# Patient Record
Sex: FEMALE | Race: BLACK OR AFRICAN AMERICAN | NOT HISPANIC OR LATINO | Employment: OTHER | URBAN - METROPOLITAN AREA
[De-identification: names, ages, dates, MRNs, and addresses within clinical notes are randomized per-mention and may not be internally consistent; named-entity substitution may affect disease eponyms.]

---

## 2017-05-03 ENCOUNTER — OFFICE VISIT (OUTPATIENT)
Dept: RHEUMATOLOGY | Facility: CLINIC | Age: 67
End: 2017-05-03
Payer: MEDICARE

## 2017-05-03 ENCOUNTER — LAB VISIT (OUTPATIENT)
Dept: LAB | Facility: HOSPITAL | Age: 67
End: 2017-05-03
Attending: INTERNAL MEDICINE
Payer: MEDICARE

## 2017-05-03 VITALS
BODY MASS INDEX: 23.56 KG/M2 | HEART RATE: 71 BPM | SYSTOLIC BLOOD PRESSURE: 131 MMHG | DIASTOLIC BLOOD PRESSURE: 72 MMHG | HEIGHT: 64 IN | WEIGHT: 138 LBS

## 2017-05-03 DIAGNOSIS — M85.89 OSTEOPENIA OF MULTIPLE SITES: ICD-10-CM

## 2017-05-03 DIAGNOSIS — M32.9 SYSTEMIC LUPUS ERYTHEMATOSUS: ICD-10-CM

## 2017-05-03 DIAGNOSIS — I73.00 RAYNAUD'S PHENOMENON WITHOUT GANGRENE: ICD-10-CM

## 2017-05-03 DIAGNOSIS — Z51.81 MEDICATION MONITORING ENCOUNTER: ICD-10-CM

## 2017-05-03 DIAGNOSIS — M32.19 OTHER SYSTEMIC LUPUS ERYTHEMATOSUS WITH OTHER ORGAN INVOLVEMENT: Primary | ICD-10-CM

## 2017-05-03 DIAGNOSIS — M87.022 AVASCULAR NECROSIS OF HUMERAL HEAD, LEFT: ICD-10-CM

## 2017-05-03 DIAGNOSIS — E55.9 HYPOVITAMINOSIS D: Chronic | ICD-10-CM

## 2017-05-03 LAB
ALBUMIN SERPL BCP-MCNC: 3.9 G/DL
ALP SERPL-CCNC: 70 U/L
ALT SERPL W/O P-5'-P-CCNC: 13 U/L
ANION GAP SERPL CALC-SCNC: 8 MMOL/L
AST SERPL-CCNC: 22 U/L
BASOPHILS # BLD AUTO: 0.02 K/UL
BASOPHILS NFR BLD: 0.3 %
BILIRUB SERPL-MCNC: 1.1 MG/DL
BUN SERPL-MCNC: 10 MG/DL
C3 SERPL-MCNC: 144 MG/DL
C4 SERPL-MCNC: 34 MG/DL
CALCIUM SERPL-MCNC: 9.4 MG/DL
CHLORIDE SERPL-SCNC: 108 MMOL/L
CO2 SERPL-SCNC: 27 MMOL/L
CREAT SERPL-MCNC: 0.9 MG/DL
CRP SERPL-MCNC: 2.5 MG/L
DIFFERENTIAL METHOD: ABNORMAL
EOSINOPHIL # BLD AUTO: 0.1 K/UL
EOSINOPHIL NFR BLD: 0.8 %
ERYTHROCYTE [DISTWIDTH] IN BLOOD BY AUTOMATED COUNT: 12.3 %
ERYTHROCYTE [SEDIMENTATION RATE] IN BLOOD BY WESTERGREN METHOD: 13 MM/HR
EST. GFR  (AFRICAN AMERICAN): >60 ML/MIN/1.73 M^2
EST. GFR  (NON AFRICAN AMERICAN): >60 ML/MIN/1.73 M^2
GLUCOSE SERPL-MCNC: 93 MG/DL
HCT VFR BLD AUTO: 44.8 %
HGB BLD-MCNC: 14.7 G/DL
LYMPHOCYTES # BLD AUTO: 2.6 K/UL
LYMPHOCYTES NFR BLD: 36.7 %
MCH RBC QN AUTO: 31.6 PG
MCHC RBC AUTO-ENTMCNC: 32.8 %
MCV RBC AUTO: 96 FL
MONOCYTES # BLD AUTO: 0.6 K/UL
MONOCYTES NFR BLD: 8.1 %
NEUTROPHILS # BLD AUTO: 3.9 K/UL
NEUTROPHILS NFR BLD: 54.1 %
PLATELET # BLD AUTO: 196 K/UL
PMV BLD AUTO: 8.8 FL
POTASSIUM SERPL-SCNC: 3.9 MMOL/L
PROT SERPL-MCNC: 8.5 G/DL
RBC # BLD AUTO: 4.65 M/UL
SODIUM SERPL-SCNC: 143 MMOL/L
WBC # BLD AUTO: 7.17 K/UL

## 2017-05-03 PROCEDURE — 99999 PR PBB SHADOW E&M-EST. PATIENT-LVL III: CPT | Mod: PBBFAC,,, | Performed by: INTERNAL MEDICINE

## 2017-05-03 PROCEDURE — 1159F MED LIST DOCD IN RCRD: CPT | Mod: S$GLB,,, | Performed by: INTERNAL MEDICINE

## 2017-05-03 PROCEDURE — 1125F AMNT PAIN NOTED PAIN PRSNT: CPT | Mod: S$GLB,,, | Performed by: INTERNAL MEDICINE

## 2017-05-03 PROCEDURE — 99214 OFFICE O/P EST MOD 30 MIN: CPT | Mod: S$GLB,,, | Performed by: INTERNAL MEDICINE

## 2017-05-03 PROCEDURE — 1160F RVW MEDS BY RX/DR IN RCRD: CPT | Mod: S$GLB,,, | Performed by: INTERNAL MEDICINE

## 2017-05-03 RX ORDER — DESONIDE 0.5 MG/G
CREAM TOPICAL 2 TIMES DAILY
Qty: 60 G | Refills: 2 | Status: SHIPPED | OUTPATIENT
Start: 2017-05-03 | End: 2018-10-30 | Stop reason: SDUPTHER

## 2017-05-03 ASSESSMENT — ROUTINE ASSESSMENT OF PATIENT INDEX DATA (RAPID3): MDHAQ FUNCTION SCORE: 1.3

## 2017-05-03 ASSESSMENT — SYSTEMIC LUPUS ERYTHEMATOSUS DISEASE ACTIVITY INDEX (SLEDAI): TOTAL_SCORE: 0

## 2017-05-03 NOTE — PATIENT INSTRUCTIONS
Continue 3 plaquenil/  Week and vit D weekly    Exercises good    Ok for new cream to use on face twice day    Sunscreen daily

## 2017-05-03 NOTE — PROGRESS NOTES
RHEUMATOLOGY FOLLOWUP    CHIEF COMPLAINT:  Shoulder pain - 4/10.    PERTINENT HISTORY:  Alice is followed here with chronic systemic lupus.  It   has been significant in the past, but more recently it has been on remission.    Unfortunately, she developed AVN in both shoulders.  The left has been hurting   more than the right.  At last visit, we thought about Dr. Ruiz do a shoulder   replacement.  She actually went and talked to them and just never got set up.    She still rates her pain intolerable and in a hurry to get that done.  She does   have significant limited function of both of her shoulders, cannot hold her   grandchild, etc.  She is just taking pain meds on occasion.  She has had   tramadol.  From a lupus standpoint, she has got abnormal rash on her chin.  They   are occasional and she has been using some Kenalog.  No other significant   rashes.  She has not had any real pleurisy.  She does get pain in her costal   margin when she bends forward to  things.  It goes away quickly and it is   not pleuritic.  She has had no shortness of breath.  She had no angina.  No   real other cardiopulmonary symptoms.  GI tract has been doing good.  She has had   no hematuria or urinary symptoms.  Strength has been good.  She has had no   peripheral joints tenderness and swelling, mostly pain her shoulders.    REVIEW OF SYSTEMS:  GENERAL:  Weight is stable.  BMI is 23.  No serious infections.  Immunizations   look to be up-to-date, other than shingles.  It does not appear that she has had   Tdap recently either.  No fevers, chills, fatigability, change in appetite,   weight loss.  No recent infections.  SKIN:  A little rash on her chin off and on.  No itching or changes in color or   texture of skin, no Raynaud's, no malar rash.  HEAD:  No increased photosensitivity or hair loss.  No headache or recent head   trauma.  EYES:  Visual acuity fine.  No ocular pain or redness.  EARS:  Denies ear pain, discharge  or vertigo.  NOSE:  No loss of smell.  No epistaxis or postnasal drip.  MOUTH AND THROAT:  No stomatitis.  No hoarseness or change in voice or oral   ulcers.  No difficulty swallowing or dryness.  NODES:  Denies swollen glands.  CHEST:  Negative other than what is mentioned with the pain when she bends over.    Denies shortness of breath, KOROMA, cyanosis, wheezing, cough and sputum   production.  CARDIOVASCULAR:  Denies chest pain, PND, orthopnea or reduced exercise   tolerance.  ABDOMEN:  GI tract has been stable.  No weight loss.  Denies nausea, vomiting,   diarrhea, abdominal pain, hematemesis or blood in stool.  URINARY:  No flank pain, dysuria or hematuria.  PERIPHERAL VASCULAR:  Raynaud's still occurs, though no digital ulcers.  No new   peripheral rashes.  No claudication or cyanosis.  NEUROLOGIC:  No numbness, weakness or tingling.  MUSCULOSKELETAL:  Muscles, she gets occasional muscle spasms at night, uses   Flexeril, helps her rest.  She uses gabapentin as well 100 mg three times daily   to help her pain in her shoulders.    The rest of the ROS is normal.    PHYSICAL EXAMINATION:  VITAL SIGNS:  With a weight 62 kg, blood pressure 131/72, pulse in the 70s, pain   score 4.  APPEARANCE:  Well nourished, well developed, in no acute distress.  SKIN:  No rashes, no wounds, no ecchymosis.  Nail beds pink.  No digital ulcers.    No nodules or tightness noted.  HEENT:  She has no adenopathy today.  No parotid, submandibular thyroid   enlargement.  Normocephalic, atraumatic, alert and oriented X3.  PERRLA.    Conjunctivae clear, sclerae nonicteric.  No tonsillar enlargement.  No   pharyngeal erythema or exudate.  No ulcers or lesions noted.  Mucous membranes   moist and pink.  Oral pharynx clear.  Neck supple, no JVD.  Normal ROM.  Thyroid   normal.  No masses or tracheal deviation.  No cervical, axillary or inguinal   lymph node enlargement.  CHEST:  Lungs clear to auscultation bilaterally.  No dullness to  percussion.  No   accessory muscle use.  No intercostal retraction noted.  CARDIOVASCULAR:  Normal S1, S2.  No rubs, murmurs or gallops.  No peripheral   edema.  ABDOMEN:  Bowel sounds normal, abdomen soft, not distended.  No tenderness or   masses.  No hepatosplenomegaly.  EXTREMITIES:  No clubbing, cyanosis or edema noted.  Dorsalis and pedis pulses   2+ palpable.  NEUROLOGICAL:  Cranial nerves II-XII intact.  Motor 5/5 strength major   flexors/extensors.  No tremor.  GAIT AND POSTURE:  Normal gait.  No cerebellar signs.  MUSCULOSKELETAL:  Exam shows more limitation of both shoulders.  She has about   40 degrees of abduction and flexion on the right shoulder, maybe 20 to 25 on the   left shoulder.  X-rays reviewed with extensive cystic changes and flattening of   the humeral heads, with superior migration.  Other than around her shoulder   where she has pain, her hands look excellent with PIP, MPs, wrist and hip good   range of motion.  No swelling or redness.  Hips and knees do not move well.  No   fluid in either knee.  Ankles:  There is a little crepitus in the ankles, though   it does not hurt.  I do not have any x-rays on those areas.  MTPs are not   tender.    Laboratory studies that are back, white count 7000, with hematocrit 45%,   platelets 196, sed rate 13.  Electrolytes are normal.  Creatinine 0.9.  Glucose   normal.  Calcium normal.  LFTs normal.  Total protein slightly increased to 8.5.    Chronically elevated bilirubin consistent with Gilbert's.  Urinalysis with no   proteinuria, 1+ occult blood with only 6 red cells.    IMPRESSIONS:  1.  Systemic lupus erythematosus - CDAI presently is at 0.  2.  Osteopenia by history, on drug holiday presently.  3.  AVN humeral heads, symptomatic, left greater than right.    PLAN:  We will change her cream to DesOwen, as it is a little safer for her   face, 0.05%.  She could use Kenalog on her trunk if she gets a flare in the   future.  This rash that appears to be  on her chin is not a  classic lupus rash,   it may just be some type of eczema or rosacea type rash.  Keep Plaquenil at just   three times a week.  Continue off Fosamax with bone density due in March 2018.    Continue her vitamin D once a week.  Encouraged her to exercise and keep   healthy; otherwise, back here in about four months with routine lab.        /jon [id number] blank(s)        DANIKA/DAMARI  dd: 05/03/2017 10:38:15 (CDT)  td: 05/03/2017 20:46:05 (CDT)  Doc ID   #[id number]  Job ID #[id number]    CC: Nic Cuellar M.D.

## 2017-05-03 NOTE — MR AVS SNAPSHOT
[facility]  [address] [place] [address]  Phone: [phone]  Fax: [phone]                  Alice Loza   5/3/2017 9:30 AM   Office Visit    Description:  Female : 1950   Provider:  Nii Maguire MD   Department:  [facility] - Rheumatology           Reason for Visit     Lupus     Pain           Diagnoses this Visit        Comments    Other systemic lupus erythematosus with other organ involvement    -  Primary     Raynaud's phenomenon without gangrene         Medication monitoring encounter         Avascular necrosis of humeral head, left         Hypovitaminosis D         Osteopenia of multiple sites                To Do List           Future Appointments        Provider Department Dept Phone    2017 8:30 AM FIELDS, VISUAL-ONE [facility] [phone]    2017 9:00 AM ANIL Ruts OD [facility] [phone]    2017 9:40 AM LABORATORY, [facility] [phone]    2017 9:50 AM SPECIMEN, [facility] [phone]    2017 10:00 AM Cristina Tamayo PA-C [facility] [phone]      Goals (5 Years of Data)     None      Follow-Up and Disposition     Return in about 4 months (around 9/3/2017) for [facility] +ua.       These Medications        Disp Refills Start End    desonide (DESOWEN) 0.05 % cream 60 g 2 5/3/2017 2017    Apply topically 2 (two) times daily. - Topical (Top)    Pharmacy: [facility] [address] [place] [address] Ph #: [phone]         [facility] On [facility]     [facility] Nurse Care Line - 24/7 Assistance  Unless otherwise directed by your provider, please contact [facility], our nurse care line that is available for 24/7 assistance.     Registered nurses in the [facility] Center provide: appointment scheduling, clinical advisement, health education, and other advisory services.  Call: [phone] (toll  "free)               Medications           Message regarding Medications     Verify the changes and/or additions to your medication regime listed below are the same as discussed with your clinician today.  If any of these changes or additions are incorrect, please notify your healthcare provider.        START taking these NEW medications        Refills    desonide (DESOWEN) 0.05 % cream 2    Sig: Apply topically 2 (two) times daily.    Class: Print    Route: Topical (Top)           Verify that the below list of medications is an accurate representation of the medications you are currently taking.  If none reported, the list may be blank. If incorrect, please contact your healthcare provider. Carry this list with you in case of emergency.           Current Medications     cyclobenzaprine (FLEXERIL) 10 MG tablet Take 1 tablet (10 mg total) by mouth every evening.    desonide (DESOWEN) 0.05 % cream Apply topically 2 (two) times daily.    ergocalciferol (ERGOCALCIFEROL) 50,000 unit Cap Take 1 capsule (50,000 Units total) by mouth every 7 days.    gabapentin (NEURONTIN) 100 MG capsule Take 1 capsule (100 mg total) by mouth 3 (three) times daily. 1 capsule Oral Three times a day    HYDROCODONE/ACETAMINOPHEN (NORCO ORAL) Take by mouth.    hydroxychloroquine (PLAQUENIL) 200 mg tablet Take one tablet by  Mouth three times a week    lorazepam (ATIVAN) 0.5 MG tablet Take 1 tablet (0.5 mg total) by mouth every 8 (eight) hours as needed for Anxiety.    nystatin (MYCOSTATIN) powder Apply topically Twice daily.    tramadol (ULTRAM) 50 mg tablet Take 1 tablet (50 mg total) by mouth every 6 (six) hours as needed for Pain.    triamcinolone acetonide 0.1% (KENALOG) 0.1 % cream Apply topically 2 (two) times daily. Thin film to area on face           Clinical Reference Information           Your Vitals Were     BP Pulse Height Weight BMI    131/72 71 5' 4" (1.626 m) 62.6 kg (138 lb 0.1 oz) 23.69 kg/m2      Blood Pressure          Most " Recent Value    BP  131/72      Allergies as of 5/3/2017     No Known Drug Allergies      Immunizations Administered on Date of Encounter - 5/3/2017     None      Orders Placed During Today's Visit     Future Labs/Procedures Expected by Expires    MILDRED  5/3/2017 5/3/2018    Recurring Lab Work Interval Expires    C-reactive protein   5/3/2018    CBC auto differential   5/3/2018    Comprehensive metabolic panel   5/3/2018    Sedimentation rate, manual   5/3/2018    Urinalysis   7/2/2018      [facility] Sign-Up     Activating your [facility] account is as easy as 1-2-3!     1) Visit [email or web address], select Sign Up Now, enter this activation code and your date of birth, then select Next.  [email or web address]  Expires: 6/17/2017 10:46 AM      2) Create a username and password to use when you visit [facility] in the future and select a security question in case you lose your password and select Next.    3) Enter your e-mail address and click Sign Up!    Additional Information  If you have questions, please e-mail [email or web address] or call [phone] to talk to our [facility] staff. Remember, [facility] is NOT to be used for urgent needs. For medical emergencies, dial 911.         Instructions    Continue 3 plaquenil  Week and vit D weekly    Exercises good    Ok for new cream to use on face twice day    Sunscreen daily       Language Assistance Services     ATTENTION: Language assistance services are available, free of charge. Please call [phone].      ATENCIÓN: Si habla rosemaryañol, tiene a stevens disposición servicios gratuitos de asistencia lingüística. Llame al [phone].     [facility] Ý: N?u b?n nói Ti?ng Vi?t, có các d?ch v? h? tr? ngôn ng? mi?n phí dành cho b?n. G?i s? [phone].         [facility] - [facility] complies with applicable Federal civil rights laws and does not discriminate on the basis of race, color, national origin, age, disability, or sex.

## 2017-05-04 ENCOUNTER — OFFICE VISIT (OUTPATIENT)
Dept: OPHTHALMOLOGY | Facility: CLINIC | Age: 67
End: 2017-05-04
Payer: MEDICARE

## 2017-05-04 DIAGNOSIS — Z13.5 GLAUCOMA SCREENING: ICD-10-CM

## 2017-05-04 DIAGNOSIS — H52.7 REFRACTIVE ERROR: ICD-10-CM

## 2017-05-04 DIAGNOSIS — M32.19 OTHER SYSTEMIC LUPUS ERYTHEMATOSUS WITH OTHER ORGAN INVOLVEMENT: Primary | ICD-10-CM

## 2017-05-04 DIAGNOSIS — Z79.899 LONG-TERM USE OF PLAQUENIL: ICD-10-CM

## 2017-05-04 PROCEDURE — 92015 DETERMINE REFRACTIVE STATE: CPT | Mod: S$GLB,,, | Performed by: OPTOMETRIST

## 2017-05-04 PROCEDURE — 99999 PR PBB SHADOW E&M-EST. PATIENT-LVL I: CPT | Mod: PBBFAC,,, | Performed by: OPTOMETRIST

## 2017-05-04 PROCEDURE — 92134 CPTRZ OPH DX IMG PST SGM RTA: CPT | Mod: S$GLB,,, | Performed by: OPTOMETRIST

## 2017-05-04 PROCEDURE — 92081 LIMITED VISUAL FIELD XM: CPT | Mod: S$GLB,,, | Performed by: OPTOMETRIST

## 2017-05-04 PROCEDURE — 92014 COMPRE OPH EXAM EST PT 1/>: CPT | Mod: S$GLB,,, | Performed by: OPTOMETRIST

## 2017-05-04 NOTE — PROGRESS NOTES
HPI     Last [facility] exam 01/08/2014  Long term Plaquenil use  NS  Screening for glaucoma      RE  Decreased distance and near vision  HVF/MOCT (TODAY)         Last edited by Chepe Lobato MA on 5/4/2017  9:52 AM.         Assessment /Plan     For exam results, see Encounter Report.    Other systemic lupus erythematosus with other organ involvement    Long-term use of Plaquenil    Cataract, nuclear, bilateral    Glaucoma screening    Refractive error      No plaquenil maculopathy OU. OCT and 10-2 HVF =  NL OU. Mild NS cataracts OU = discussed and will follow.  OH OK OU otherwise. Spec Rx given.  RTC one year for repeat

## 2017-05-05 LAB — DSDNA AB SER-ACNC: NORMAL [IU]/ML

## 2017-08-11 ENCOUNTER — TELEPHONE (OUTPATIENT)
Dept: FAMILY MEDICINE | Facility: CLINIC | Age: 67
End: 2017-08-11

## 2017-08-11 NOTE — TELEPHONE ENCOUNTER
----- Message from Jennifer Jsoeph sent at 8/11/2017  8:30 AM CDT -----  Contact: self [phone] or [phone]  States that she is unable to put any weight on her legs. States that both legs are hurting but the rt one is the worse. Pt would like to be worked in for an appt for today. No other providers available. Please call back at [phone] or [phone]//thank you acc

## 2017-08-11 NOTE — TELEPHONE ENCOUNTER
Patient informed no available appointments in clinic today, informed to go to ER or walk in clinic of any distress or worsening of symptoms.  She will have someone bring her somewhere to get checked today.

## 2017-09-13 ENCOUNTER — OFFICE VISIT (OUTPATIENT)
Dept: RHEUMATOLOGY | Facility: CLINIC | Age: 67
End: 2017-09-13
Payer: MEDICARE

## 2017-09-13 ENCOUNTER — LAB VISIT (OUTPATIENT)
Dept: LAB | Facility: HOSPITAL | Age: 67
End: 2017-09-13
Attending: INTERNAL MEDICINE
Payer: MEDICARE

## 2017-09-13 VITALS
SYSTOLIC BLOOD PRESSURE: 120 MMHG | BODY MASS INDEX: 22.52 KG/M2 | HEART RATE: 68 BPM | WEIGHT: 131.19 LBS | DIASTOLIC BLOOD PRESSURE: 70 MMHG

## 2017-09-13 DIAGNOSIS — M85.89 OSTEOPENIA OF MULTIPLE SITES: ICD-10-CM

## 2017-09-13 DIAGNOSIS — Z51.81 MEDICATION MONITORING ENCOUNTER: ICD-10-CM

## 2017-09-13 DIAGNOSIS — M32.19 OTHER SYSTEMIC LUPUS ERYTHEMATOSUS WITH OTHER ORGAN INVOLVEMENT: ICD-10-CM

## 2017-09-13 DIAGNOSIS — M32.19 OTHER SYSTEMIC LUPUS ERYTHEMATOSUS WITH OTHER ORGAN INVOLVEMENT: Primary | ICD-10-CM

## 2017-09-13 DIAGNOSIS — M87.029: ICD-10-CM

## 2017-09-13 LAB
ALBUMIN SERPL BCP-MCNC: 3.9 G/DL
ALP SERPL-CCNC: 67 U/L
ALT SERPL W/O P-5'-P-CCNC: 7 U/L
ANION GAP SERPL CALC-SCNC: 9 MMOL/L
AST SERPL-CCNC: 18 U/L
BASOPHILS # BLD AUTO: 0.01 K/UL
BASOPHILS NFR BLD: 0.2 %
BILIRUB SERPL-MCNC: 1.2 MG/DL
BUN SERPL-MCNC: 12 MG/DL
CALCIUM SERPL-MCNC: 9.9 MG/DL
CHLORIDE SERPL-SCNC: 108 MMOL/L
CO2 SERPL-SCNC: 27 MMOL/L
CREAT SERPL-MCNC: 0.9 MG/DL
CRP SERPL-MCNC: 1.7 MG/L
DIFFERENTIAL METHOD: ABNORMAL
EOSINOPHIL # BLD AUTO: 0.1 K/UL
EOSINOPHIL NFR BLD: 0.9 %
ERYTHROCYTE [DISTWIDTH] IN BLOOD BY AUTOMATED COUNT: 12.8 %
ERYTHROCYTE [SEDIMENTATION RATE] IN BLOOD BY WESTERGREN METHOD: 10 MM/HR
EST. GFR  (AFRICAN AMERICAN): >60 ML/MIN/1.73 M^2
EST. GFR  (NON AFRICAN AMERICAN): >60 ML/MIN/1.73 M^2
GLUCOSE SERPL-MCNC: 94 MG/DL
HCT VFR BLD AUTO: 43.2 %
HGB BLD-MCNC: 14.3 G/DL
LYMPHOCYTES # BLD AUTO: 2.4 K/UL
LYMPHOCYTES NFR BLD: 37.1 %
MCH RBC QN AUTO: 32.2 PG
MCHC RBC AUTO-ENTMCNC: 33.1 G/DL
MCV RBC AUTO: 97 FL
MONOCYTES # BLD AUTO: 0.7 K/UL
MONOCYTES NFR BLD: 10.8 %
NEUTROPHILS # BLD AUTO: 3.3 K/UL
NEUTROPHILS NFR BLD: 51 %
PLATELET # BLD AUTO: 210 K/UL
PMV BLD AUTO: 8.7 FL
POTASSIUM SERPL-SCNC: 4.1 MMOL/L
PROT SERPL-MCNC: 8.4 G/DL
RBC # BLD AUTO: 4.44 M/UL
SODIUM SERPL-SCNC: 144 MMOL/L
WBC # BLD AUTO: 6.41 K/UL

## 2017-09-13 PROCEDURE — 86140 C-REACTIVE PROTEIN: CPT

## 2017-09-13 PROCEDURE — 99214 OFFICE O/P EST MOD 30 MIN: CPT | Mod: S$GLB,,, | Performed by: PHYSICIAN ASSISTANT

## 2017-09-13 PROCEDURE — 36415 COLL VENOUS BLD VENIPUNCTURE: CPT | Mod: PO

## 2017-09-13 PROCEDURE — 85025 COMPLETE CBC W/AUTO DIFF WBC: CPT | Mod: PO

## 2017-09-13 PROCEDURE — 1159F MED LIST DOCD IN RCRD: CPT | Mod: S$GLB,,, | Performed by: PHYSICIAN ASSISTANT

## 2017-09-13 PROCEDURE — 3008F BODY MASS INDEX DOCD: CPT | Mod: S$GLB,,, | Performed by: PHYSICIAN ASSISTANT

## 2017-09-13 PROCEDURE — 99999 PR PBB SHADOW E&M-EST. PATIENT-LVL III: CPT | Mod: PBBFAC,,, | Performed by: PHYSICIAN ASSISTANT

## 2017-09-13 PROCEDURE — 80053 COMPREHEN METABOLIC PANEL: CPT | Mod: PO

## 2017-09-13 PROCEDURE — 85651 RBC SED RATE NONAUTOMATED: CPT | Mod: PO

## 2017-09-13 PROCEDURE — 1125F AMNT PAIN NOTED PAIN PRSNT: CPT | Mod: S$GLB,,, | Performed by: PHYSICIAN ASSISTANT

## 2017-12-29 ENCOUNTER — OFFICE VISIT (OUTPATIENT)
Dept: FAMILY MEDICINE | Facility: CLINIC | Age: 67
End: 2017-12-29
Payer: MEDICARE

## 2017-12-29 ENCOUNTER — HOSPITAL ENCOUNTER (OUTPATIENT)
Dept: RADIOLOGY | Facility: HOSPITAL | Age: 67
Discharge: HOME | End: 2017-12-29
Attending: NURSE PRACTITIONER
Payer: MEDICARE

## 2017-12-29 VITALS
BODY MASS INDEX: 22.17 KG/M2 | HEART RATE: 76 BPM | DIASTOLIC BLOOD PRESSURE: 71 MMHG | HEIGHT: 64 IN | WEIGHT: 129.88 LBS | SYSTOLIC BLOOD PRESSURE: 123 MMHG | TEMPERATURE: 98 F

## 2017-12-29 DIAGNOSIS — R10.84 GENERALIZED ABDOMINAL PAIN: Primary | ICD-10-CM

## 2017-12-29 DIAGNOSIS — R10.84 GENERALIZED ABDOMINAL PAIN: ICD-10-CM

## 2017-12-29 DIAGNOSIS — R53.83 FATIGUE, UNSPECIFIED TYPE: ICD-10-CM

## 2017-12-29 PROCEDURE — 99999 PR PBB SHADOW E&M-EST. PATIENT-LVL III: CPT | Mod: PBBFAC,,, | Performed by: NURSE PRACTITIONER

## 2017-12-29 PROCEDURE — 99213 OFFICE O/P EST LOW 20 MIN: CPT | Mod: S$GLB,,, | Performed by: NURSE PRACTITIONER

## 2017-12-29 PROCEDURE — 74000 XR KUB: CPT | Mod: 26,,, | Performed by: RADIOLOGY

## 2017-12-29 PROCEDURE — 74000 XR KUB: CPT | Mod: TC,PO

## 2017-12-29 RX ORDER — HYOSCYAMINE SULFATE 0.125 MG
125 TABLET ORAL EVERY 4 HOURS PRN
Qty: 30 TABLET | Refills: 0 | Status: SHIPPED | OUTPATIENT
Start: 2017-12-29 | End: 2020-02-04

## 2017-12-29 NOTE — PATIENT INSTRUCTIONS
Abdominal Pain    Abdominal pain is pain in the stomach or belly area. Everyone has this pain from time to time. In many cases it goes away on its own. But abdominal pain can sometimes be due to a serious problem, such as appendicitis. So its important to know when to seek help.  Causes of abdominal pain  There are many possible causes of abdominal pain. Common causes in adults include:  · Constipation, diarrhea, or gas  · Stomach acid flowing back up into the esophagus (acid reflux or heartburn)  · Severe acid reflux, called GERD (gastroesophageal reflux disease)  · A sore in the lining of the stomach or small intestine (peptic ulcer)  · Inflammation of the gallbladder, liver, or pancreas  · Gallstones or kidney stones  · Appendicitis   · Intestinal blockage   · An internal organ pushing through a muscle or other tissue (hernia)  · Urinary tract infections  · In women, menstrual cramps, fibroids, or endometriosis  · Inflammation or infection of the intestines  Diagnosing the cause of abdominal pain  Your healthcare provider will do a physical exam help find the cause of your pain. If needed, tests will be ordered. Belly pain has many possible causes. So it can be hard to find the reason for your pain. Giving details about your pain can help. Tell your provider where and when you feel the pain, and what makes it better or worse. Also let your provider know if you have other symptoms such as:  · Fever  · Tiredness  · Upset stomach (nausea)  · Vomiting  · Changes in bathroom habits  Treating abdominal pain  Some causes of pain need emergency medical treatment right away. These include appendicitis or a bowel blockage. Other problems can be treated with rest, fluids, or medicines. Your healthcare provider can give you specific instructions for treatment or self-care based on what is causing your pain.  If you have vomiting or diarrhea, sip water or other clear fluids. When you are ready to eat solid foods again,  start with small amounts of easy-to-digest, low-fat foods. These include apple sauce, toast, or crackers.   When to seek medical care  Call 911 or go to the hospital right away if you:  · Cant pass stool and are vomiting  · Are vomiting blood or have bloody diarrhea or black, tarry diarrhea  · Have chest, neck, or shoulder pain  · Feel like you might pass out  · Have pain in your shoulder blades with nausea  · Have sudden, severe belly pain  · Have new, severe pain unlike any you have felt before  · Have a belly that is rigid, hard, and tender to touch  Call your healthcare provider if you have:  · Pain for more than 5 days  · Bloating for more than 2 days  · Diarrhea for more than 5 days  · A fever of 100.4°F (38.0°C) or higher, or as directed by your provider  · Pain that gets worse  · Weight loss for no reason  · Continued lack of appetite  · Blood in your stool  How to prevent abdominal pain  Here are some tips to help prevent abdominal pain:  · Eat smaller amounts of food at one time.  · Avoid greasy, fried, or other high-fat foods.  · Avoid foods that give you gas.  · Exercise regularly.  · Drink plenty of fluids.  To help prevent GERD symptoms:  · Quit smoking.  · Reduce alcohol and certain foods that increase stomach acid.  · Avoid aspirin and over-the-counter pain and fever medicines (NSAIDS or nonsteroidal anti-inflammatory drugs), if possible  · Lose extra weight.  · Finish eating at least 2 hours before you go to bed or lie down.  · Raise the head of your bed.  Date Last Reviewed: 7/1/2016  © 3190-2896 [email or web address]. [address] [place] [address], [place] [address]. All rights reserved. This information is not intended as a substitute for professional medical care. Always follow your healthcare professional's instructions.

## 2017-12-29 NOTE — PROGRESS NOTES
"Subjective:      Patient ID: Alice Loza is a 67 y.o. female.    Chief Complaint: Fatigue and Abdominal Pain    Fatigue   This is a new problem. Episode onset: Monday. The problem has been gradually improving. Associated symptoms include abdominal pain, arthralgias (chronic), fatigue and weakness. Pertinent negatives include no anorexia, chest pain, chills, congestion, coughing, fever, headaches, joint swelling, myalgias, nausea, numbness, rash, sore throat, urinary symptoms or vomiting. Nothing aggravates the symptoms. She has tried nothing for the symptoms. The treatment provided no relief.   Abdominal Pain   This is a new problem. Episode onset: Sunday. The problem occurs intermittently. The problem has been waxing and waning. The pain is located in the generalized abdominal region. The pain is moderate. The quality of the pain is sharp (sharp shooting pain at times; patient describes pain as "little midgets jumping all over belly"). The abdominal pain does not radiate. Associated symptoms include arthralgias (chronic). Pertinent negatives include no anorexia, belching, constipation, diarrhea, dysuria, fever, flatus, frequency, headaches, hematochezia, hematuria, melena, myalgias, nausea, vomiting or weight loss. Associated symptoms comments: Some heart burn. Nothing aggravates the pain. The pain is relieved by nothing. She has tried nothing for the symptoms. The treatment provided no relief.     Review of Systems   Constitutional: Positive for fatigue. Negative for chills, fever and weight loss.   HENT: Negative for congestion and sore throat.    Respiratory: Negative for cough, shortness of breath and wheezing.    Cardiovascular: Negative for chest pain, palpitations and leg swelling.   Gastrointestinal: Positive for abdominal pain. Negative for anorexia, constipation, diarrhea, flatus, hematochezia, melena, nausea and vomiting.   Genitourinary: Negative for dysuria, frequency and hematuria. " "  Musculoskeletal: Positive for arthralgias (chronic). Negative for joint swelling and myalgias.   Skin: Negative for rash and wound.   Neurological: Positive for weakness. Negative for numbness and headaches.   Psychiatric/Behavioral: The patient is not nervous/anxious.        Objective:     /71   Pulse 76   Temp 98.3 °F (36.8 °C) (Oral)   Ht 5' 4" (1.626 m)   Wt 58.9 kg (129 lb 13.6 oz)   BMI 22.29 kg/m²     Physical Exam   Constitutional: She is oriented to person, place, and time. She appears well-developed and well-nourished.   HENT:   Head: Normocephalic.   Eyes: Pupils are equal, round, and reactive to light.   Cardiovascular: Normal rate, regular rhythm and normal heart sounds.    Pulmonary/Chest: Effort normal and breath sounds normal. No respiratory distress. She has no decreased breath sounds. She has no wheezes. She has no rhonchi. She has no rales.   Abdominal: Soft. Bowel sounds are normal. She exhibits no distension and no mass. There is tenderness. There is no rebound and no guarding.   Lymphadenopathy:     She has no cervical adenopathy.   Neurological: She is alert and oriented to person, place, and time.   Skin: Skin is warm and dry. No rash noted.   Psychiatric: She has a normal mood and affect. Her behavior is normal. Judgment and thought content normal.   Vitals reviewed.    Assessment:     1. Generalized abdominal pain    2. Fatigue, unspecified type        Plan:     Problem List Items Addressed This Visit        Other    Fatigue      Other Visit Diagnoses     Generalized abdominal pain    -  Primary    Relevant Medications    hyoscyamine (ANASPAZ,LEVSIN) 0.125 mg Tab    Other Relevant Orders    X-Ray KUB      Sharp diet  Hydrate well   Report to ER if symptoms worsen    Return if symptoms worsen or fail to improve.        "

## 2018-01-09 ENCOUNTER — PATIENT OUTREACH (OUTPATIENT)
Dept: ADMINISTRATIVE | Facility: HOSPITAL | Age: 68
End: 2018-01-09

## 2018-01-09 DIAGNOSIS — Z11.59 NEED FOR HEPATITIS C SCREENING TEST: Primary | ICD-10-CM

## 2018-01-10 ENCOUNTER — LAB VISIT (OUTPATIENT)
Dept: LAB | Facility: HOSPITAL | Age: 68
End: 2018-01-10
Attending: FAMILY MEDICINE
Payer: MEDICARE

## 2018-01-10 DIAGNOSIS — Z51.81 MEDICATION MONITORING ENCOUNTER: ICD-10-CM

## 2018-01-10 DIAGNOSIS — Z11.59 NEED FOR HEPATITIS C SCREENING TEST: ICD-10-CM

## 2018-01-10 DIAGNOSIS — M32.19 OTHER SYSTEMIC LUPUS ERYTHEMATOSUS WITH OTHER ORGAN INVOLVEMENT: ICD-10-CM

## 2018-01-10 LAB
ALBUMIN SERPL BCP-MCNC: 3.4 G/DL
ALP SERPL-CCNC: 57 U/L
ALT SERPL W/O P-5'-P-CCNC: 7 U/L
ANION GAP SERPL CALC-SCNC: 10 MMOL/L
AST SERPL-CCNC: 17 U/L
BASOPHILS # BLD AUTO: 0.04 K/UL
BASOPHILS NFR BLD: 0.5 %
BILIRUB SERPL-MCNC: 0.7 MG/DL
BUN SERPL-MCNC: 8 MG/DL
C3 SERPL-MCNC: 152 MG/DL
C4 SERPL-MCNC: 36 MG/DL
CALCIUM SERPL-MCNC: 9.5 MG/DL
CHLORIDE SERPL-SCNC: 107 MMOL/L
CO2 SERPL-SCNC: 26 MMOL/L
CREAT SERPL-MCNC: 0.8 MG/DL
CRP SERPL-MCNC: 7.2 MG/L
DIFFERENTIAL METHOD: ABNORMAL
EOSINOPHIL # BLD AUTO: 0.1 K/UL
EOSINOPHIL NFR BLD: 1.3 %
ERYTHROCYTE [DISTWIDTH] IN BLOOD BY AUTOMATED COUNT: 12.3 %
ERYTHROCYTE [SEDIMENTATION RATE] IN BLOOD BY WESTERGREN METHOD: 37 MM/HR
EST. GFR  (AFRICAN AMERICAN): >60 ML/MIN/1.73 M^2
EST. GFR  (NON AFRICAN AMERICAN): >60 ML/MIN/1.73 M^2
GLUCOSE SERPL-MCNC: 89 MG/DL
HCT VFR BLD AUTO: 39.8 %
HGB BLD-MCNC: 12.7 G/DL
LYMPHOCYTES # BLD AUTO: 2.5 K/UL
LYMPHOCYTES NFR BLD: 32.6 %
MCH RBC QN AUTO: 30.8 PG
MCHC RBC AUTO-ENTMCNC: 31.9 G/DL
MCV RBC AUTO: 97 FL
MONOCYTES # BLD AUTO: 0.8 K/UL
MONOCYTES NFR BLD: 10.4 %
NEUTROPHILS # BLD AUTO: 4.3 K/UL
NEUTROPHILS NFR BLD: 55.2 %
PLATELET # BLD AUTO: 350 K/UL
PMV BLD AUTO: 8.2 FL
POTASSIUM SERPL-SCNC: 4.5 MMOL/L
PROT SERPL-MCNC: 8.3 G/DL
RBC # BLD AUTO: 4.12 M/UL
SODIUM SERPL-SCNC: 143 MMOL/L
WBC # BLD AUTO: 7.7 K/UL

## 2018-01-10 PROCEDURE — 86140 C-REACTIVE PROTEIN: CPT

## 2018-01-10 PROCEDURE — 86803 HEPATITIS C AB TEST: CPT

## 2018-01-10 PROCEDURE — 86160 COMPLEMENT ANTIGEN: CPT | Mod: 59

## 2018-01-10 PROCEDURE — 85651 RBC SED RATE NONAUTOMATED: CPT | Mod: PO

## 2018-01-10 PROCEDURE — 80053 COMPREHEN METABOLIC PANEL: CPT

## 2018-01-10 PROCEDURE — 36415 COLL VENOUS BLD VENIPUNCTURE: CPT | Mod: PO

## 2018-01-10 PROCEDURE — 86038 ANTINUCLEAR ANTIBODIES: CPT

## 2018-01-10 PROCEDURE — 85025 COMPLETE CBC W/AUTO DIFF WBC: CPT | Mod: PO

## 2018-01-10 PROCEDURE — 86160 COMPLEMENT ANTIGEN: CPT

## 2018-01-11 LAB
ANA SER QL IF: NORMAL
HCV AB SERPL QL IA: NEGATIVE

## 2018-01-23 NOTE — PROGRESS NOTES
"Subjective:       Patient ID: Alice Loza is a 67 y.o. female.    Chief Complaint: SLE, osteopoenia.    Alice is here for f/u. We see her for SLE, recently has been in remission but has been significant in the past.  She has AVN both shoulders, has seen Dr. Ruiz, was planning replacement at some point for both shoulders.  However recently they have felt fine, no issues with pain which is great.  Regarding her lupus, she is on plaquenil 3 times a week. Last several ANAs have been negative as has the dsdna, complements have been normal.     She also has a h/o osteopenia and is currently on bisphosphonate holiday (fosamax). Not taking saravanan D. Bone density due March 2018. She denies any fevers, hair loss, sob, new rashes (does have some hyperpigmentation on the tops of her feet around the 1 mtp), no GI issues, no joint swelling. She complains of feeling more tired lately.        Review of Systems   Constitutional: Positive for fatigue. Negative for chills and fever.   HENT: Negative for mouth sores, rhinorrhea and sore throat.    Eyes: Negative for pain and redness.   Respiratory: Negative for cough and shortness of breath.    Cardiovascular: Negative for chest pain.   Gastrointestinal: Negative for abdominal pain, constipation, diarrhea, nausea and vomiting.   Genitourinary: Negative for dysuria and hematuria.   Musculoskeletal: Positive for arthralgias and back pain. Negative for joint swelling and myalgias.        Left hip replacement   Skin: Negative for rash.   Neurological: Negative for weakness, numbness and headaches.   Psychiatric/Behavioral: The patient is not nervous/anxious.          Objective:   /66   Pulse 77   Ht 5' 4" (1.626 m)   Wt 57.8 kg (127 lb 6.8 oz)   BMI 21.87 kg/m²      Physical Exam   Constitutional: She is oriented to person, place, and time and well-developed, well-nourished, and in no distress.   HENT:   Head: Normocephalic and atraumatic.   Eyes: Pupils are equal, " round, and reactive to light. Right eye exhibits no discharge.   Neck: Normal range of motion.   Cardiovascular: Normal rate, regular rhythm and normal heart sounds.  Exam reveals no friction rub.    Pulmonary/Chest: Effort normal and breath sounds normal. No respiratory distress.   Abdominal: Soft. She exhibits no distension. There is no tenderness.   Lymphadenopathy:     She has no cervical adenopathy.   Neurological: She is alert and oriented to person, place, and time.   Skin: No rash noted. No erythema.     No rash  skyler feet with some hyperpigmentation to her dorsal surface around mtps   Psychiatric: Mood normal.   Musculoskeletal: Normal range of motion. She exhibits no edema or deformity.   skyler wrists, mcps, pips no synvoitis, no tenderness, no warmth, good rom  Shoulders decreased rom with right shoulder with 90 abduction and left at about 45 degress, tenderness to palpation  skyler knees no effusion, no warmth, no tenderness        Recent Results (from the past 840 hour(s))   Urinalysis    Collection Time: 01/10/18  7:36 AM   Result Value Ref Range    Specimen UA Urine, Clean Catch     Color, UA Yellow Yellow, Straw, Mary    Appearance, UA Clear Clear    pH, UA 8.0 5.0 - 8.0    Specific Gravity, UA 1.010 1.005 - 1.030    Protein, UA Negative Negative    Glucose, UA Negative Negative    Ketones, UA Negative Negative    Bilirubin (UA) Negative Negative    Occult Blood UA Trace (A) Negative    Nitrite, UA Negative Negative    Leukocytes, UA Negative Negative   CBC auto differential    Collection Time: 01/10/18  8:25 AM   Result Value Ref Range    WBC 7.70 3.90 - 12.70 K/uL    RBC 4.12 4.00 - 5.40 M/uL    Hemoglobin 12.7 12.0 - 16.0 g/dL    Hematocrit 39.8 37.0 - 48.5 %    MCV 97 82 - 98 fL    MCH 30.8 27.0 - 31.0 pg    MCHC 31.9 (L) 32.0 - 36.0 g/dL    RDW 12.3 11.5 - 14.5 %    Platelets 350 150 - 350 K/uL    MPV 8.2 (L) 9.2 - 12.9 fL    Gran # 4.3 1.8 - 7.7 K/uL    Lymph # 2.5 1.0 - 4.8 K/uL    Mono # 0.8 0.3 -  1.0 K/uL    Eos # 0.1 0.0 - 0.5 K/uL    Baso # 0.04 0.00 - 0.20 K/uL    Gran% 55.2 38.0 - 73.0 %    Lymph% 32.6 18.0 - 48.0 %    Mono% 10.4 4.0 - 15.0 %    Eosinophil% 1.3 0.0 - 8.0 %    Basophil% 0.5 0.0 - 1.9 %    Differential Method Automated    Comprehensive metabolic panel    Collection Time: 01/10/18  8:39 AM   Result Value Ref Range    Sodium 143 136 - 145 mmol/L    Potassium 4.5 3.5 - 5.1 mmol/L    Chloride 107 95 - 110 mmol/L    CO2 26 23 - 29 mmol/L    Glucose 89 70 - 110 mg/dL    BUN, Bld 8 8 - 23 mg/dL    Creatinine 0.8 0.5 - 1.4 mg/dL    Calcium 9.5 8.7 - 10.5 mg/dL    Total Protein 8.3 6.0 - 8.4 g/dL    Albumin 3.4 (L) 3.5 - 5.2 g/dL    Total Bilirubin 0.7 0.1 - 1.0 mg/dL    Alkaline Phosphatase 57 55 - 135 U/L    AST 17 10 - 40 U/L    ALT 7 (L) 10 - 44 U/L    Anion Gap 10 8 - 16 mmol/L    eGFR if African American >60.0 >60 mL/min/1.73 m^2    eGFR if non African American >60.0 >60 mL/min/1.73 m^2   C-reactive protein    Collection Time: 01/10/18  8:39 AM   Result Value Ref Range    CRP 7.2 0.0 - 8.2 mg/L   Sedimentation rate, manual    Collection Time: 01/10/18  8:39 AM   Result Value Ref Range    Sed Rate 37 (H) 0 - 20 mm/Hr   MILDRED    Collection Time: 01/10/18  8:39 AM   Result Value Ref Range    MILDRED Screen Negative <1:160 Negative <1:160   C3 complement    Collection Time: 01/10/18  8:39 AM   Result Value Ref Range    Complement (C-3) 152 50 - 180 mg/dL   C4 complement    Collection Time: 01/10/18  8:39 AM   Result Value Ref Range    Complement (C-4) 36 11 - 44 mg/dL   Hepatitis C antibody    Collection Time: 01/10/18  8:39 AM   Result Value Ref Range    Hepatitis C Ab Negative           Dexa 3.11.15: RF -1.0, RN -2.2, spine -0.2, stable bmd  Assessment:       1. Other systemic lupus erythematosus with other organ involvement    2. Avascular necrosis of humeral head, unspecified laterality    3. Raynaud's phenomenon without gangrene    4. Medication monitoring encounter    5. Osteopenia of multiple  sites    6. Avascular necrosis of humeral head, left    7. Disorder of bone and articular cartilage          Impression:  SLE: plaquenil 3 x week, stable, continues in remission with last few tamela's neg, dsdna and complements normal, no new symptoms, mild sed rate elevation but she says she was sick with a stomach bug at the time of her labs    Raynauds: stable    AVN skyler shoulders: planned replacements with dr. ma in future, chronic pain but lately have been doing much better, takes flexeril prn for pain    Medication monitoring; no signs of toxicity, sees eye doctor yearly    Osteopenia: on drug holiday (prev fosamax), dexa due march 2018;     Plan:       No change to plaquenil 3 x week  Cont f/u with ortho for shoulders  Cont yearly eye checks  Repeat dexa next visit on fosamax holiday currently  Refilled plaquenil and flexeril today     Return to clinic in 4 months with dsdna, reg 4, urine, complements

## 2018-01-24 ENCOUNTER — OFFICE VISIT (OUTPATIENT)
Dept: RHEUMATOLOGY | Facility: CLINIC | Age: 68
End: 2018-01-24
Payer: MEDICARE

## 2018-01-24 VITALS
BODY MASS INDEX: 21.76 KG/M2 | HEIGHT: 64 IN | SYSTOLIC BLOOD PRESSURE: 113 MMHG | WEIGHT: 127.44 LBS | HEART RATE: 77 BPM | DIASTOLIC BLOOD PRESSURE: 66 MMHG

## 2018-01-24 DIAGNOSIS — Z51.81 MEDICATION MONITORING ENCOUNTER: ICD-10-CM

## 2018-01-24 DIAGNOSIS — I73.00 RAYNAUD'S PHENOMENON WITHOUT GANGRENE: ICD-10-CM

## 2018-01-24 DIAGNOSIS — M94.9 DISORDER OF BONE AND ARTICULAR CARTILAGE: ICD-10-CM

## 2018-01-24 DIAGNOSIS — M87.022 AVASCULAR NECROSIS OF HUMERAL HEAD, LEFT: ICD-10-CM

## 2018-01-24 DIAGNOSIS — M85.89 OSTEOPENIA OF MULTIPLE SITES: ICD-10-CM

## 2018-01-24 DIAGNOSIS — M89.9 DISORDER OF BONE AND ARTICULAR CARTILAGE: ICD-10-CM

## 2018-01-24 DIAGNOSIS — M32.19 OTHER SYSTEMIC LUPUS ERYTHEMATOSUS WITH OTHER ORGAN INVOLVEMENT: Primary | ICD-10-CM

## 2018-01-24 DIAGNOSIS — M87.029: ICD-10-CM

## 2018-01-24 PROCEDURE — 99999 PR PBB SHADOW E&M-EST. PATIENT-LVL IV: CPT | Mod: PBBFAC,,, | Performed by: PHYSICIAN ASSISTANT

## 2018-01-24 PROCEDURE — 99214 OFFICE O/P EST MOD 30 MIN: CPT | Mod: S$GLB,,, | Performed by: PHYSICIAN ASSISTANT

## 2018-01-24 RX ORDER — HYDROXYCHLOROQUINE SULFATE 200 MG/1
TABLET, FILM COATED ORAL
Qty: 36 TABLET | Refills: 3 | Status: SHIPPED | OUTPATIENT
Start: 2018-01-24 | End: 2020-04-17 | Stop reason: SDUPTHER

## 2018-01-24 RX ORDER — CYCLOBENZAPRINE HCL 10 MG
10 TABLET ORAL NIGHTLY
Qty: 90 TABLET | Refills: 3 | Status: SHIPPED | OUTPATIENT
Start: 2018-01-24 | End: 2018-07-25 | Stop reason: SDUPTHER

## 2018-05-09 ENCOUNTER — OFFICE VISIT (OUTPATIENT)
Dept: OPHTHALMOLOGY | Facility: CLINIC | Age: 68
End: 2018-05-09
Payer: MEDICARE

## 2018-05-09 DIAGNOSIS — H25.13 CATARACT, NUCLEAR SCLEROTIC SENILE, BILATERAL: ICD-10-CM

## 2018-05-09 DIAGNOSIS — Z13.5 GLAUCOMA SCREENING: ICD-10-CM

## 2018-05-09 DIAGNOSIS — M32.19 OTHER SYSTEMIC LUPUS ERYTHEMATOSUS WITH OTHER ORGAN INVOLVEMENT: Primary | ICD-10-CM

## 2018-05-09 DIAGNOSIS — H52.13 MYOPIC ASTIGMATISM OF BOTH EYES: ICD-10-CM

## 2018-05-09 DIAGNOSIS — H52.4 PRESBYOPIA: ICD-10-CM

## 2018-05-09 DIAGNOSIS — H52.203 MYOPIC ASTIGMATISM OF BOTH EYES: ICD-10-CM

## 2018-05-09 DIAGNOSIS — Z79.899 LONG-TERM USE OF PLAQUENIL: ICD-10-CM

## 2018-05-09 PROCEDURE — 92014 COMPRE OPH EXAM EST PT 1/>: CPT | Mod: S$GLB,,, | Performed by: OPTOMETRIST

## 2018-05-09 PROCEDURE — 99999 PR PBB SHADOW E&M-EST. PATIENT-LVL I: CPT | Mod: PBBFAC,,, | Performed by: OPTOMETRIST

## 2018-05-09 PROCEDURE — 92015 DETERMINE REFRACTIVE STATE: CPT | Mod: S$GLB,,, | Performed by: OPTOMETRIST

## 2018-05-09 NOTE — PROGRESS NOTES
HPI     Last [facility] exam 05/04/2017  Long term plaquenil use (Lupus)  Cataract, nuclear, bilateral  Screening for glaucoma  RE  HVF/MOCT (Today)  DFE (Today)    Last edited by Chepe Lobato MA on 5/9/2018 10:06 AM. (History)            Assessment /Plan     For exam results, see Encounter Report.    Other systemic lupus erythematosus with other organ involvement    Long-term use of Plaquenil    Cataract, nuclear sclerotic senile, bilateral    Glaucoma screening    Myopic astigmatism of both eyes    Presbyopia      10-2 HVF and MOCT are normal OU.  Moderate NS cataracts OU = discussed and will follow.  OH OK OU otherwise.  Spec Rx given.  RTC one year for repeat of all testing.

## 2018-07-23 ENCOUNTER — LAB VISIT (OUTPATIENT)
Dept: LAB | Facility: HOSPITAL | Age: 68
End: 2018-07-23
Attending: FAMILY MEDICINE
Payer: MEDICARE

## 2018-07-23 DIAGNOSIS — Z51.81 MEDICATION MONITORING ENCOUNTER: ICD-10-CM

## 2018-07-23 DIAGNOSIS — M32.19 OTHER SYSTEMIC LUPUS ERYTHEMATOSUS WITH OTHER ORGAN INVOLVEMENT: ICD-10-CM

## 2018-07-23 LAB
ALBUMIN SERPL BCP-MCNC: 3.9 G/DL
ALP SERPL-CCNC: 63 U/L
ALT SERPL W/O P-5'-P-CCNC: 6 U/L
ANION GAP SERPL CALC-SCNC: 9 MMOL/L
AST SERPL-CCNC: 20 U/L
BASOPHILS # BLD AUTO: 0.02 K/UL
BASOPHILS NFR BLD: 0.3 %
BILIRUB SERPL-MCNC: 1.1 MG/DL
BUN SERPL-MCNC: 12 MG/DL
C3 SERPL-MCNC: 127 MG/DL
C4 SERPL-MCNC: 30 MG/DL
CALCIUM SERPL-MCNC: 9.7 MG/DL
CHLORIDE SERPL-SCNC: 109 MMOL/L
CO2 SERPL-SCNC: 25 MMOL/L
CREAT SERPL-MCNC: 0.9 MG/DL
CRP SERPL-MCNC: 1.3 MG/L
DIFFERENTIAL METHOD: ABNORMAL
EOSINOPHIL # BLD AUTO: 0.1 K/UL
EOSINOPHIL NFR BLD: 1.5 %
ERYTHROCYTE [DISTWIDTH] IN BLOOD BY AUTOMATED COUNT: 12.9 %
ERYTHROCYTE [SEDIMENTATION RATE] IN BLOOD BY WESTERGREN METHOD: 6 MM/HR
EST. GFR  (AFRICAN AMERICAN): >60 ML/MIN/1.73 M^2
EST. GFR  (NON AFRICAN AMERICAN): >60 ML/MIN/1.73 M^2
GLUCOSE SERPL-MCNC: 86 MG/DL
HCT VFR BLD AUTO: 42.2 %
HGB BLD-MCNC: 13.6 G/DL
LYMPHOCYTES # BLD AUTO: 2.2 K/UL
LYMPHOCYTES NFR BLD: 35.4 %
MCH RBC QN AUTO: 30.9 PG
MCHC RBC AUTO-ENTMCNC: 32.2 G/DL
MCV RBC AUTO: 96 FL
MONOCYTES # BLD AUTO: 0.7 K/UL
MONOCYTES NFR BLD: 12 %
NEUTROPHILS # BLD AUTO: 3.2 K/UL
NEUTROPHILS NFR BLD: 50.8 %
PLATELET # BLD AUTO: 212 K/UL
PMV BLD AUTO: 8.6 FL
POTASSIUM SERPL-SCNC: 4.5 MMOL/L
PROT SERPL-MCNC: 8 G/DL
RBC # BLD AUTO: 4.4 M/UL
SODIUM SERPL-SCNC: 143 MMOL/L
WBC # BLD AUTO: 6.19 K/UL

## 2018-07-23 PROCEDURE — 86225 DNA ANTIBODY NATIVE: CPT

## 2018-07-23 PROCEDURE — 86140 C-REACTIVE PROTEIN: CPT

## 2018-07-23 PROCEDURE — 86160 COMPLEMENT ANTIGEN: CPT

## 2018-07-23 PROCEDURE — 85025 COMPLETE CBC W/AUTO DIFF WBC: CPT | Mod: PO

## 2018-07-23 PROCEDURE — 86160 COMPLEMENT ANTIGEN: CPT | Mod: 59

## 2018-07-23 PROCEDURE — 80053 COMPREHEN METABOLIC PANEL: CPT

## 2018-07-23 PROCEDURE — 36415 COLL VENOUS BLD VENIPUNCTURE: CPT | Mod: PO

## 2018-07-23 PROCEDURE — 85651 RBC SED RATE NONAUTOMATED: CPT | Mod: PO

## 2018-07-24 LAB — DSDNA AB SER-ACNC: NORMAL [IU]/ML

## 2018-07-24 NOTE — PROGRESS NOTES
"Subjective:       Patient ID: Alice Loza is a 67 y.o. female.    Chief Complaint: SLE, osteopoenia.    Alice is here for f/u. We see her for SLE, recently has been in remission but has been significant in the past.  She has AVN both shoulders, had seen Dr. Ruiz, was planning replacement at some point for both shoulders.  However recently they have felt fine, no issues with pain which is great so no surgery planned.  Regarding her lupus, she is on plaquenil 3 times a week. Last several ANAs have been negative as has the dsdna, complements have been normal.    She also has a h/o osteopenia and is currently on bisphosphonate holiday (fosamax).  H/o low vitamin D and takes 50K units weekly.  Dexa today with stable bmd.     She denies any fevers, hair loss, sob, new rashes (does have some hyperpigmentation on the tops of her feet around the 1 mtp), no GI issues, no joint swelling. C/o 0/10 pain today.       Review of Systems   Constitutional: Positive for fatigue. Negative for chills and fever.   HENT: Negative for mouth sores, rhinorrhea and sore throat.    Eyes: Negative for pain and redness.   Respiratory: Negative for cough and shortness of breath.    Cardiovascular: Negative for chest pain.   Gastrointestinal: Negative for abdominal pain, constipation, diarrhea, nausea and vomiting.   Genitourinary: Negative for dysuria and hematuria.   Musculoskeletal: Positive for arthralgias and back pain. Negative for joint swelling and myalgias.        Left hip replacement   Skin: Negative for rash.   Neurological: Negative for weakness, numbness and headaches.   Psychiatric/Behavioral: The patient is not nervous/anxious.          Objective:   /63   Pulse 60   Ht 5' 4" (1.626 m)   Wt 59.4 kg (130 lb 15.3 oz)   BMI 22.48 kg/m²      Physical Exam   Constitutional: She is oriented to person, place, and time and well-developed, well-nourished, and in no distress.   HENT:   Head: Normocephalic and atraumatic. "   Eyes: Pupils are equal, round, and reactive to light. Right eye exhibits no discharge.   Neck: Normal range of motion.   Cardiovascular: Normal rate, regular rhythm and normal heart sounds.  Exam reveals no friction rub.    Pulmonary/Chest: Effort normal and breath sounds normal. No respiratory distress.   Abdominal: Soft. She exhibits no distension. There is no tenderness.   Lymphadenopathy:     She has no cervical adenopathy.   Neurological: She is alert and oriented to person, place, and time.   Skin: No rash noted. No erythema.     No rash  skyler feet with some hyperpigmentation to her dorsal surface around mtps   Psychiatric: Mood normal.   Musculoskeletal: Normal range of motion. She exhibits no edema or deformity.   skyler wrists, mcps, pips no synvoitis, no tenderness, no warmth, good rom  Shoulders decreased rom with right shoulder with 90 abduction and left at about 45 degress  skyler knees no effusion, no warmth, no tenderness        Recent Results (from the past 840 hour(s))   CBC auto differential    Collection Time: 07/23/18  9:35 AM   Result Value Ref Range    WBC 6.19 3.90 - 12.70 K/uL    RBC 4.40 4.00 - 5.40 M/uL    Hemoglobin 13.6 12.0 - 16.0 g/dL    Hematocrit 42.2 37.0 - 48.5 %    MCV 96 82 - 98 fL    MCH 30.9 27.0 - 31.0 pg    MCHC 32.2 32.0 - 36.0 g/dL    RDW 12.9 11.5 - 14.5 %    Platelets 212 150 - 350 K/uL    MPV 8.6 (L) 9.2 - 12.9 fL    Gran # (ANC) 3.2 1.8 - 7.7 K/uL    Lymph # 2.2 1.0 - 4.8 K/uL    Mono # 0.7 0.3 - 1.0 K/uL    Eos # 0.1 0.0 - 0.5 K/uL    Baso # 0.02 0.00 - 0.20 K/uL    Gran% 50.8 38.0 - 73.0 %    Lymph% 35.4 18.0 - 48.0 %    Mono% 12.0 4.0 - 15.0 %    Eosinophil% 1.5 0.0 - 8.0 %    Basophil% 0.3 0.0 - 1.9 %    Differential Method Automated    Comprehensive metabolic panel    Collection Time: 07/23/18  9:35 AM   Result Value Ref Range    Sodium 143 136 - 145 mmol/L    Potassium 4.5 3.5 - 5.1 mmol/L    Chloride 109 95 - 110 mmol/L    CO2 25 23 - 29 mmol/L    Glucose 86 70 - 110  mg/dL    BUN, Bld 12 8 - 23 mg/dL    Creatinine 0.9 0.5 - 1.4 mg/dL    Calcium 9.7 8.7 - 10.5 mg/dL    Total Protein 8.0 6.0 - 8.4 g/dL    Albumin 3.9 3.5 - 5.2 g/dL    Total Bilirubin 1.1 (H) 0.1 - 1.0 mg/dL    Alkaline Phosphatase 63 55 - 135 U/L    AST 20 10 - 40 U/L    ALT 6 (L) 10 - 44 U/L    Anion Gap 9 8 - 16 mmol/L    eGFR if African American >60.0 >60 mL/min/1.73 m^2    eGFR if non African American >60.0 >60 mL/min/1.73 m^2   C-reactive protein    Collection Time: 07/23/18  9:35 AM   Result Value Ref Range    CRP 1.3 0.0 - 8.2 mg/L   Sedimentation rate, manual    Collection Time: 07/23/18  9:35 AM   Result Value Ref Range    Sed Rate 6 0 - 20 mm/Hr   C3 complement    Collection Time: 07/23/18  9:35 AM   Result Value Ref Range    Complement (C-3) 127 50 - 180 mg/dL   C4 complement    Collection Time: 07/23/18  9:35 AM   Result Value Ref Range    Complement (C-4) 30 11 - 44 mg/dL   Anti-DNA antibody, double-stranded    Collection Time: 07/23/18  9:35 AM   Result Value Ref Range    ds DNA Ab Negative 1:10 Negative 1:10          Dexa 3.11.15: RF -1.0, RN -2.2, spine -0.2, stable bmd  Dexa 7.25.18: RF -2.0, spine 0.1, stable bmd, frax major 9.9%, hip 1.9%  Assessment:       1. Other systemic lupus erythematosus with other organ involvement    2. Osteopenia of multiple sites    3. Raynaud's phenomenon without gangrene    4. Avascular necrosis of head of humerus, unspecified laterality    5. Medication monitoring encounter    6. Hypovitaminosis D    7. Avascular necrosis of humeral head, left          Impression:  SLE: plaquenil 3 x week, stable, continues in remission with last few tamela's neg, dsdna and complements normal, no new symptoms, no signs of activity     Raynauds: stable    AVN skyler shoulders: had planned replacements with dr. Ruiz, chronic pain but lately have been doing much better, takes flexeril nightly     Medication monitoring; no signs of toxicity, sees eye doctor yearly    Osteopenia: on drug  holiday (prev fosamax), dexa today with stable bmd, no need for tx, vit D weekly for low levels    Low vit D: last checked years ago, on weekly 50K units     Plan:       No change to plaquenil 3 x week  Cont weekly vit D  Cont f/u with ortho for shoulders  Cont yearly eye checks  Repeat dexa 2021 (2-3 years)  Refilled vit D, and flexeril today     Return to clinic in 6 months with dsdna, reg 4, urine, complements, vit D

## 2018-07-25 ENCOUNTER — OFFICE VISIT (OUTPATIENT)
Dept: RHEUMATOLOGY | Facility: CLINIC | Age: 68
End: 2018-07-25
Payer: MEDICARE

## 2018-07-25 VITALS
DIASTOLIC BLOOD PRESSURE: 63 MMHG | HEIGHT: 64 IN | WEIGHT: 130.94 LBS | HEART RATE: 60 BPM | SYSTOLIC BLOOD PRESSURE: 129 MMHG | BODY MASS INDEX: 22.35 KG/M2

## 2018-07-25 DIAGNOSIS — Z51.81 MEDICATION MONITORING ENCOUNTER: ICD-10-CM

## 2018-07-25 DIAGNOSIS — M87.029 AVASCULAR NECROSIS OF HEAD OF HUMERUS, UNSPECIFIED LATERALITY: ICD-10-CM

## 2018-07-25 DIAGNOSIS — E55.9 HYPOVITAMINOSIS D: ICD-10-CM

## 2018-07-25 DIAGNOSIS — M85.89 OSTEOPENIA OF MULTIPLE SITES: ICD-10-CM

## 2018-07-25 DIAGNOSIS — M32.19 OTHER SYSTEMIC LUPUS ERYTHEMATOSUS WITH OTHER ORGAN INVOLVEMENT: Primary | ICD-10-CM

## 2018-07-25 DIAGNOSIS — I73.00 RAYNAUD'S PHENOMENON WITHOUT GANGRENE: ICD-10-CM

## 2018-07-25 DIAGNOSIS — M87.022 AVASCULAR NECROSIS OF HUMERAL HEAD, LEFT: ICD-10-CM

## 2018-07-25 PROCEDURE — 99214 OFFICE O/P EST MOD 30 MIN: CPT | Mod: S$GLB,,, | Performed by: PHYSICIAN ASSISTANT

## 2018-07-25 PROCEDURE — 99999 PR PBB SHADOW E&M-EST. PATIENT-LVL IV: CPT | Mod: PBBFAC,,, | Performed by: PHYSICIAN ASSISTANT

## 2018-07-25 RX ORDER — ERGOCALCIFEROL 1.25 MG/1
50000 CAPSULE ORAL
Qty: 12 CAPSULE | Refills: 3 | Status: SHIPPED | OUTPATIENT
Start: 2018-07-25 | End: 2020-08-12 | Stop reason: SDUPTHER

## 2018-07-25 RX ORDER — CYCLOBENZAPRINE HCL 10 MG
10 TABLET ORAL NIGHTLY
Qty: 90 TABLET | Refills: 3 | Status: SHIPPED | OUTPATIENT
Start: 2018-07-25 | End: 2020-08-12 | Stop reason: SDUPTHER

## 2018-07-25 NOTE — PATIENT INSTRUCTIONS
Bone strength is good, no need to change treatment     Continue plaquenil three times weekly     Continue weekly vitamin D, will check level next time     Gabapentin is for nerve pain, if legs start feeling funny can take three times a day or can take all three just at night

## 2018-10-17 ENCOUNTER — TELEPHONE (OUTPATIENT)
Dept: OPHTHALMOLOGY | Facility: CLINIC | Age: 68
End: 2018-10-17

## 2018-10-17 NOTE — TELEPHONE ENCOUNTER
----- Message from Matilde Marroquin sent at 10/17/2018  3:38 PM CDT -----  Contact: pt  The pt states she misplaced her eye glass prescription please fax another script to [phone], the pt can be reached at [phone]///[id number]

## 2018-10-30 ENCOUNTER — OFFICE VISIT (OUTPATIENT)
Dept: RHEUMATOLOGY | Facility: CLINIC | Age: 68
End: 2018-10-30
Payer: MEDICARE

## 2018-10-30 ENCOUNTER — LAB VISIT (OUTPATIENT)
Dept: LAB | Facility: HOSPITAL | Age: 68
End: 2018-10-30
Attending: STUDENT IN AN ORGANIZED HEALTH CARE EDUCATION/TRAINING PROGRAM
Payer: MEDICARE

## 2018-10-30 VITALS
HEART RATE: 73 BPM | WEIGHT: 132.94 LBS | DIASTOLIC BLOOD PRESSURE: 69 MMHG | SYSTOLIC BLOOD PRESSURE: 116 MMHG | BODY MASS INDEX: 22.82 KG/M2

## 2018-10-30 DIAGNOSIS — Z51.81 MEDICATION MONITORING ENCOUNTER: ICD-10-CM

## 2018-10-30 DIAGNOSIS — M87.00 AVASCULAR NECROSIS: ICD-10-CM

## 2018-10-30 DIAGNOSIS — M32.9 SYSTEMIC LUPUS ERYTHEMATOSUS, UNSPECIFIED SLE TYPE, UNSPECIFIED ORGAN INVOLVEMENT STATUS: ICD-10-CM

## 2018-10-30 DIAGNOSIS — M32.19 OTHER SYSTEMIC LUPUS ERYTHEMATOSUS WITH OTHER ORGAN INVOLVEMENT: ICD-10-CM

## 2018-10-30 DIAGNOSIS — M32.9 SYSTEMIC LUPUS ERYTHEMATOSUS, UNSPECIFIED SLE TYPE, UNSPECIFIED ORGAN INVOLVEMENT STATUS: Primary | ICD-10-CM

## 2018-10-30 DIAGNOSIS — M32.9 SYSTEMIC LUPUS ERYTHEMATOSUS: ICD-10-CM

## 2018-10-30 DIAGNOSIS — M85.80 OSTEOPENIA, UNSPECIFIED LOCATION: ICD-10-CM

## 2018-10-30 LAB
ALBUMIN SERPL BCP-MCNC: 3.7 G/DL
ALP SERPL-CCNC: 70 U/L
ALT SERPL W/O P-5'-P-CCNC: 16 U/L
ANION GAP SERPL CALC-SCNC: 9 MMOL/L
AST SERPL-CCNC: 28 U/L
BASOPHILS # BLD AUTO: 0.02 K/UL
BASOPHILS NFR BLD: 0.3 %
BILIRUB SERPL-MCNC: 1.1 MG/DL
BUN SERPL-MCNC: 12 MG/DL
C3 SERPL-MCNC: 166 MG/DL
C4 SERPL-MCNC: 41 MG/DL
CALCIUM SERPL-MCNC: 9.9 MG/DL
CHLORIDE SERPL-SCNC: 107 MMOL/L
CO2 SERPL-SCNC: 25 MMOL/L
CREAT SERPL-MCNC: 0.8 MG/DL
CRP SERPL-MCNC: 59 MG/L
DIFFERENTIAL METHOD: ABNORMAL
EOSINOPHIL # BLD AUTO: 0.1 K/UL
EOSINOPHIL NFR BLD: 1.8 %
ERYTHROCYTE [DISTWIDTH] IN BLOOD BY AUTOMATED COUNT: 12.3 %
ERYTHROCYTE [SEDIMENTATION RATE] IN BLOOD BY WESTERGREN METHOD: 23 MM/HR
EST. GFR  (AFRICAN AMERICAN): >60 ML/MIN/1.73 M^2
EST. GFR  (NON AFRICAN AMERICAN): >60 ML/MIN/1.73 M^2
GLUCOSE SERPL-MCNC: 91 MG/DL
HCT VFR BLD AUTO: 44 %
HGB BLD-MCNC: 14.4 G/DL
LYMPHOCYTES # BLD AUTO: 2.6 K/UL
LYMPHOCYTES NFR BLD: 38 %
MCH RBC QN AUTO: 31.6 PG
MCHC RBC AUTO-ENTMCNC: 32.7 G/DL
MCV RBC AUTO: 97 FL
MONOCYTES # BLD AUTO: 0.4 K/UL
MONOCYTES NFR BLD: 6.4 %
NEUTROPHILS # BLD AUTO: 3.6 K/UL
NEUTROPHILS NFR BLD: 53.5 %
PLATELET # BLD AUTO: 200 K/UL
PMV BLD AUTO: 8.9 FL
POTASSIUM SERPL-SCNC: 4.5 MMOL/L
PROT SERPL-MCNC: 9.1 G/DL
RBC # BLD AUTO: 4.56 M/UL
SODIUM SERPL-SCNC: 141 MMOL/L
WBC # BLD AUTO: 6.73 K/UL

## 2018-10-30 PROCEDURE — 86225 DNA ANTIBODY NATIVE: CPT

## 2018-10-30 PROCEDURE — 86146 BETA-2 GLYCOPROTEIN ANTIBODY: CPT | Mod: 59

## 2018-10-30 PROCEDURE — 99214 OFFICE O/P EST MOD 30 MIN: CPT | Mod: S$PBB,,, | Performed by: STUDENT IN AN ORGANIZED HEALTH CARE EDUCATION/TRAINING PROGRAM

## 2018-10-30 PROCEDURE — 85025 COMPLETE CBC W/AUTO DIFF WBC: CPT | Mod: PO

## 2018-10-30 PROCEDURE — 86160 COMPLEMENT ANTIGEN: CPT | Mod: 59

## 2018-10-30 PROCEDURE — 86160 COMPLEMENT ANTIGEN: CPT

## 2018-10-30 PROCEDURE — 99213 OFFICE O/P EST LOW 20 MIN: CPT | Mod: PBBFAC,PO | Performed by: STUDENT IN AN ORGANIZED HEALTH CARE EDUCATION/TRAINING PROGRAM

## 2018-10-30 PROCEDURE — 99999 PR PBB SHADOW E&M-EST. PATIENT-LVL III: CPT | Mod: PBBFAC,,, | Performed by: STUDENT IN AN ORGANIZED HEALTH CARE EDUCATION/TRAINING PROGRAM

## 2018-10-30 PROCEDURE — 80053 COMPREHEN METABOLIC PANEL: CPT | Mod: PO

## 2018-10-30 PROCEDURE — 36415 COLL VENOUS BLD VENIPUNCTURE: CPT | Mod: PO

## 2018-10-30 PROCEDURE — 85613 RUSSELL VIPER VENOM DILUTED: CPT

## 2018-10-30 PROCEDURE — 86140 C-REACTIVE PROTEIN: CPT

## 2018-10-30 PROCEDURE — 85651 RBC SED RATE NONAUTOMATED: CPT | Mod: PO

## 2018-10-30 PROCEDURE — 1101F PT FALLS ASSESS-DOCD LE1/YR: CPT | Mod: CPTII,,, | Performed by: STUDENT IN AN ORGANIZED HEALTH CARE EDUCATION/TRAINING PROGRAM

## 2018-10-30 PROCEDURE — 86147 CARDIOLIPIN ANTIBODY EA IG: CPT

## 2018-10-30 RX ORDER — TRIAMCINOLONE ACETONIDE 1 MG/G
CREAM TOPICAL 2 TIMES DAILY
Qty: 15 G | Refills: 2 | Status: SHIPPED | OUTPATIENT
Start: 2018-10-30 | End: 2020-08-12 | Stop reason: SDUPTHER

## 2018-10-30 RX ORDER — GABAPENTIN 300 MG/1
300 CAPSULE ORAL 3 TIMES DAILY
Qty: 90 CAPSULE | Refills: 11 | Status: SHIPPED | OUTPATIENT
Start: 2018-10-30 | End: 2021-08-26 | Stop reason: SDUPTHER

## 2018-10-30 RX ORDER — GABAPENTIN 300 MG/1
300 CAPSULE ORAL 3 TIMES DAILY
Qty: 90 CAPSULE | Refills: 11 | Status: SHIPPED | OUTPATIENT
Start: 2018-10-30 | End: 2018-10-30 | Stop reason: SDUPTHER

## 2018-10-30 RX ORDER — VITAMIN E 268 MG
400 CAPSULE ORAL DAILY
COMMUNITY
Start: 2018-10-30

## 2018-10-30 RX ORDER — DESONIDE 0.5 MG/G
CREAM TOPICAL 2 TIMES DAILY
Qty: 60 G | Refills: 2 | Status: SHIPPED | OUTPATIENT
Start: 2018-10-30 | End: 2023-02-28 | Stop reason: SDUPTHER

## 2018-10-30 NOTE — PROGRESS NOTES
RHEUMATOLOGY OUTPATIENT CLINIC NOTE    10/30/2018    Attending Rheumatologist: Angie Hughes  Primary Care Provider: Nic Cuellar MD   Physician Requesting Consultation: No referring provider defined for this encounter.  Chief Complaint/Reason For Consultation:  Fatigue/ weakness / generalized achiness    Subjective:       HPI  Alice Loza is a 68 y.o.  AAF who presents for f/u SLE  -Previously had been stable and in remission, doing well w/ plq decrease to 3x weekly.  -She has AVN both shoulders, had seen Dr. Ruiz, was planning replacement at some point for both shoulders.  Pt holding off on surgery at this point, pain manageable.  -Last several ANAs have been negative as has the dsdna, complements have been normal.  -Main manifestations include raynauds, fatigue, rash (?? More likely 2.2 allergy per chart review)  - Main complaint today is generalized fatigue and achiness. Not sleeping well. +Stress in personal ilfe. Not exercising.     She also has a h/o osteopenia and is currently on bisphosphonate holiday (fosamax).  Previous dexa w stable BMD     She denies any fevers, hair loss, sob, photosensitivity, miscarriages, blood clots, pleurisy, palpitations, swollen joints, malar rash, n/v/d, frothy urine, or muscle weakness      Review of Systems   Constitutional: Positive for malaise/fatigue. Negative for chills and fever.   HENT: Negative.  Negative for hearing loss and sinus pain.    Eyes: Negative.  Negative for blurred vision, double vision and photophobia.   Respiratory: Negative.  Negative for cough, hemoptysis, sputum production, shortness of breath and stridor.    Cardiovascular: Negative.  Negative for chest pain, palpitations, orthopnea and leg swelling.   Gastrointestinal: Negative for abdominal pain, blood in stool, diarrhea, melena, nausea and vomiting.   Musculoskeletal: Positive for back pain, joint pain, myalgias and neck pain. Negative for falls.   Skin: Negative.  Negative for  itching and rash.   Neurological: Positive for weakness. Negative for tingling, focal weakness, seizures and headaches.       Chronic comorbid conditions affecting medical decision making today:  Past Medical History:   Diagnosis Date    Avascular necrosis of hip     Left    Avascular necrosis of humeral head     Right    Cataract     Lumbar disc disease     Lupus     Osteoarthritis     Osteopenia     Osteoporosis     Raynaud phenomenon     Systemic lupus erythematosus     Unspecified vitamin D deficiency      Past Surgical History:   Procedure Laterality Date    CHOLECYSTECTOMY      COLONOSCOPY N/A 9/20/2016    Procedure: COLONOSCOPY;  Surgeon: Aroldo Raphael MD;  Location: [facility];  Service: Endoscopy;  Laterality: N/A;    COLONOSCOPY N/A 9/20/2016    Performed [facility] at [facility]    EGD (ESOPHAGOGASTRODUODENOSCOPY) N/A 7/31/2013    Performed by Everton Agosto MD at [facility] ENDO    TOTAL HIP ARTHROPLASTY      Left     Family History   Problem Relation Age of Onset    Cervical cancer Mother     Heart attack Father 62     Social History     Substance and Sexual Activity   Alcohol Use No     Social History     Tobacco Use   Smoking Status Never Smoker   Smokeless Tobacco Never Used     Social History     Substance and Sexual Activity   Drug Use No       Current Outpatient Medications:     cyclobenzaprine (FLEXERIL) 10 MG tablet, Take 1 tablet (10 mg total) by mouth every evening., Disp: 90 tablet, Rfl: 3    ergocalciferol (ERGOCALCIFEROL) 50,000 unit Cap, Take 1 capsule (50,000 Units total) by mouth every 7 days., Disp: 12 capsule, Rfl: 3    gabapentin (NEURONTIN) 100 MG capsule, Take 1 capsule (100 mg total) by mouth 3 (three) times daily. 1 capsule Oral Three times a day, Disp: 270 capsule, Rfl: 3    HYDROCODONE/ACETAMINOPHEN (NORCO ORAL), Take by mouth., Disp: , Rfl:     hydroxychloroquine (PLAQUENIL) 200 mg tablet, Take one tablet by  Mouth three times a week, Disp: 36  tablet, Rfl: 3    nystatin (MYCOSTATIN) powder, Apply topically Twice daily., Disp: , Rfl:     desonide (DESOWEN) 0.05 % cream, Apply topically 2 (two) times daily., Disp: 60 g, Rfl: 2    hyoscyamine (ANASPAZ,LEVSIN) 0.125 mg Tab, Take 1 tablet (125 mcg total) by mouth every 4 (four) hours as needed., Disp: 30 tablet, Rfl: 0    triamcinolone acetonide 0.1% (KENALOG) 0.1 % cream, Apply topically 2 (two) times daily. Thin film to area on face, Disp: 15 g, Rfl: 0       Objective:         Vitals:    10/30/18 1022   BP: 116/69   Pulse: 73     Physical Exam   Constitutional: She is oriented to person, place, and time and well-developed, well-nourished, and in no distress. No distress.   HENT:   Head: Normocephalic and atraumatic.   Eyes: Conjunctivae and EOM are normal. Pupils are equal, round, and reactive to light.   Neck: Normal range of motion. Neck supple.   Cardiovascular: Normal rate and regular rhythm.    Pulmonary/Chest: Effort normal and breath sounds normal.   Abdominal: Soft. Bowel sounds are normal.   Neurological: She is alert and oriented to person, place, and time.   Skin: Skin is warm and dry. No rash noted.     Psychiatric: Affect and judgment normal.   Musculoskeletal: Normal range of motion. She exhibits tenderness. She exhibits no edema or deformity.   +trapezius muscle tightness and tenderness          Reviewed old and all outside pertinent medical records available.    All lab results personally reviewed and interpreted by me.  Lab Results   Component Value Date    WBC 6.19 07/23/2018    HGB 13.6 07/23/2018    HCT 42.2 07/23/2018    MCV 96 07/23/2018    MCH 30.9 07/23/2018    MCHC 32.2 07/23/2018    RDW 12.9 07/23/2018     07/23/2018    MPV 8.6 (L) 07/23/2018       Lab Results   Component Value Date     07/23/2018    K 4.5 07/23/2018     07/23/2018    CO2 25 07/23/2018    GLU 86 07/23/2018    BUN 12 07/23/2018    CALCIUM 9.7 07/23/2018    PROT 8.0 07/23/2018    ALBUMIN 3.9  07/23/2018    BILITOT 1.1 (H) 07/23/2018    AST 20 07/23/2018    ALKPHOS 63 07/23/2018    ALT 6 (L) 07/23/2018       Lab Results   Component Value Date    COLORU Yellow 01/10/2018    APPEARANCEUA Clear 01/10/2018    SPECGRAV 1.010 01/10/2018    PHUR 8.0 01/10/2018    PROTEINUA Negative 01/10/2018    KETONESU Negative 01/10/2018    LEUKOCYTESUR Negative 01/10/2018    NITRITE Negative 01/10/2018    UROBILINOGEN 1.0 06/04/2014       Lab Results   Component Value Date    CRP 1.3 07/23/2018       Lab Results   Component Value Date    SEDRATE 6 07/23/2018       Lab Results   Component Value Date    MILDRED Negative 06/04/2013    SEDRATE 6 07/23/2018       No components found for: 25OHVITDTOT, 78ABXOZQ2, 82IVPNZU1, METHODNOTE    No results found for: URICACID    No components found for: TSPOTTB    MILDRED negative x3  Dsdna -  C3/c4 wnl    Imaging:  All imaging reviewed and independently  interpreted by me.    Dexa      ASSESSMENT / PLAN:     Alice Loza is a 68 y.o. Black or  female with:      1. SLE  -Main manifestations: raynauds, hairloss, photosensitivity, rash (? Likely 2.2 allergy per chart review)  -MILDRED negative; dsdna negative; no hx renal involvement   -previously stable and in remission ; on plq 3x weekly  -no new symptoms or manifestations, doing well  -can continue current regimen  -routine monitoring labs / disease activity markers as below  -continue aspirin and vitamin e    2. AVN skyler shoulders:  -chronic pain; had planned replacements with dr. Ruiz  -pain improved, takes flexeril nightly      3. Medication monitoring; no signs of toxicity, sees eye doctor yearly     4. Osteopenia:   -on drug holiday (prev fosamax)  - dexa  with stable bmd  -FRAX 9.9% /1,9% ;  no need for tx  -continue vit. D replacement        . Other specified counseling  - Immunization counseling done  - Weight loss counseling done  - Nutrition and exercise counseling  - Medication counseling provided        Method of  contact with patient concerns: [facility]      Angie Hughes M.D.  Rheumatology Department   [facility] [place] [address], [place] [address]  Phone: [phone]  Fax: [phone]

## 2018-10-31 LAB — DSDNA AB SER-ACNC: NORMAL [IU]/ML

## 2018-11-01 LAB
B2 GLYCOPROT1 IGA SER QL: <9 SAU
B2 GLYCOPROT1 IGG SER QL: <9 SGU
B2 GLYCOPROT1 IGM SER QL: <9 SMU

## 2018-11-02 LAB
CARDIOLIPIN IGG SER IA-ACNC: <9.4 GPL
CARDIOLIPIN IGM SER IA-ACNC: <9.4 MPL
LA PPP-IMP: NEGATIVE

## 2018-11-05 ENCOUNTER — TELEPHONE (OUTPATIENT)
Dept: RHEUMATOLOGY | Facility: CLINIC | Age: 68
End: 2018-11-05

## 2018-11-05 NOTE — TELEPHONE ENCOUNTER
Called pt to discuss UA and slight increased proteinuria. ++CRP 59. No worsening or change in symptoms. Otherwise disease activity markers not reflective of sle flare

## 2019-01-23 ENCOUNTER — LAB VISIT (OUTPATIENT)
Dept: LAB | Facility: HOSPITAL | Age: 69
End: 2019-01-23
Attending: FAMILY MEDICINE
Payer: MEDICARE

## 2019-01-23 DIAGNOSIS — M32.19 OTHER SYSTEMIC LUPUS ERYTHEMATOSUS WITH OTHER ORGAN INVOLVEMENT: ICD-10-CM

## 2019-01-23 DIAGNOSIS — Z51.81 MEDICATION MONITORING ENCOUNTER: ICD-10-CM

## 2019-01-23 DIAGNOSIS — E55.9 HYPOVITAMINOSIS D: ICD-10-CM

## 2019-01-23 LAB
25(OH)D3+25(OH)D2 SERPL-MCNC: 34 NG/ML
ALBUMIN SERPL BCP-MCNC: 3.8 G/DL
ALP SERPL-CCNC: 53 U/L
ALT SERPL W/O P-5'-P-CCNC: 9 U/L
ANION GAP SERPL CALC-SCNC: 9 MMOL/L
AST SERPL-CCNC: 17 U/L
BASOPHILS # BLD AUTO: 0.02 K/UL
BASOPHILS NFR BLD: 0.3 %
BILIRUB SERPL-MCNC: 0.6 MG/DL
BUN SERPL-MCNC: 9 MG/DL
C3 SERPL-MCNC: 122 MG/DL
C4 SERPL-MCNC: 28 MG/DL
CALCIUM SERPL-MCNC: 10 MG/DL
CHLORIDE SERPL-SCNC: 107 MMOL/L
CO2 SERPL-SCNC: 25 MMOL/L
CREAT SERPL-MCNC: 0.9 MG/DL
CRP SERPL-MCNC: 1.4 MG/L
DIFFERENTIAL METHOD: ABNORMAL
EOSINOPHIL # BLD AUTO: 0.1 K/UL
EOSINOPHIL NFR BLD: 1.4 %
ERYTHROCYTE [DISTWIDTH] IN BLOOD BY AUTOMATED COUNT: 12.7 %
ERYTHROCYTE [SEDIMENTATION RATE] IN BLOOD BY WESTERGREN METHOD: 9 MM/HR
EST. GFR  (AFRICAN AMERICAN): >60 ML/MIN/1.73 M^2
EST. GFR  (NON AFRICAN AMERICAN): >60 ML/MIN/1.73 M^2
GLUCOSE SERPL-MCNC: 86 MG/DL
HCT VFR BLD AUTO: 42.4 %
HGB BLD-MCNC: 13.5 G/DL
LYMPHOCYTES # BLD AUTO: 2.9 K/UL
LYMPHOCYTES NFR BLD: 49.2 %
MCH RBC QN AUTO: 30.8 PG
MCHC RBC AUTO-ENTMCNC: 31.8 G/DL
MCV RBC AUTO: 97 FL
MONOCYTES # BLD AUTO: 0.7 K/UL
MONOCYTES NFR BLD: 12.2 %
NEUTROPHILS # BLD AUTO: 2.1 K/UL
NEUTROPHILS NFR BLD: 36.9 %
PLATELET # BLD AUTO: 230 K/UL
PMV BLD AUTO: 8.8 FL
POTASSIUM SERPL-SCNC: 4.1 MMOL/L
PROT SERPL-MCNC: 8.2 G/DL
RBC # BLD AUTO: 4.39 M/UL
SODIUM SERPL-SCNC: 141 MMOL/L
WBC # BLD AUTO: 5.81 K/UL

## 2019-01-23 PROCEDURE — 85651 RBC SED RATE NONAUTOMATED: CPT | Mod: PO

## 2019-01-23 PROCEDURE — 86160 COMPLEMENT ANTIGEN: CPT

## 2019-01-23 PROCEDURE — 36415 COLL VENOUS BLD VENIPUNCTURE: CPT | Mod: PO

## 2019-01-23 PROCEDURE — 86140 C-REACTIVE PROTEIN: CPT

## 2019-01-23 PROCEDURE — 80053 COMPREHEN METABOLIC PANEL: CPT

## 2019-01-23 PROCEDURE — 82306 VITAMIN D 25 HYDROXY: CPT

## 2019-01-23 PROCEDURE — 86160 COMPLEMENT ANTIGEN: CPT | Mod: 59

## 2019-01-23 PROCEDURE — 85025 COMPLETE CBC W/AUTO DIFF WBC: CPT | Mod: PO

## 2019-01-29 NOTE — PROGRESS NOTES
Subjective:       Patient ID: Alice Loza is a 68 y.o. female.    Chief Complaint: SLE, osteopoenia.    Alice is here for f/u. We see her for SLE, recently has been in remission but has been significant in the past.  +Raynauds stable on asa and vit e.  She has AVN both shoulders, had seen Dr. Ruiz, was planning replacement at some point for both shoulders.  However recently they have felt fine, no issues with pain which is great so no surgery planned.  Regarding her lupus, she is on plaquenil 3 times a week. Last several ANAs have been negative as has the dsdna, complements have been normal. utd with eye checks    She also has a h/o osteopenia and is currently on bisphosphonate holiday (fosamax).  H/o low vitamin D and takes 50K units weekly, vit D normal range.  Dexa with stable bmd.     She denies any fevers, hair loss, sob, new rashes (does have some hyperpigmentation on the tops of her feet around the 1 mtp), no joint swelling. Today she is having back pain that started Sunday on the right side without radiation, she is wearing a brace today.  Aleve not helpful.  Has known djd and ddd in her lumbar spine.   She also is complaining of abdominal pain in the right lower quadrant that feels like a burning pain, no nausea or vomiting, no fever, no change in appetite. Denies any rash on the skin.       Review of Systems   Constitutional: Positive for fatigue. Negative for chills and fever.   HENT: Negative for mouth sores, rhinorrhea and sore throat.    Eyes: Negative for pain and redness.   Respiratory: Negative for cough and shortness of breath.    Cardiovascular: Negative for chest pain.   Gastrointestinal: Positive for abdominal pain. Negative for constipation, diarrhea, nausea and vomiting.   Genitourinary: Negative for dysuria and hematuria.   Musculoskeletal: Positive for arthralgias and back pain. Negative for joint swelling and myalgias.        Left hip replacement   Skin: Negative for rash.  "  Neurological: Negative for weakness, numbness and headaches.   Psychiatric/Behavioral: The patient is not nervous/anxious.          Objective:   /72   Pulse 74   Ht 5' 4" (1.626 m)   Wt 60.1 kg (132 lb 7.9 oz)   BMI 22.74 kg/m²      Physical Exam   Constitutional: She is oriented to person, place, and time and well-developed, well-nourished, and in no distress.   HENT:   Head: Normocephalic and atraumatic.   Eyes: Pupils are equal, round, and reactive to light. Right eye exhibits no discharge.   Neck: Normal range of motion.   Cardiovascular: Normal rate, regular rhythm and normal heart sounds.  Exam reveals no friction rub.    Pulmonary/Chest: Effort normal and breath sounds normal. No respiratory distress.   Abdominal: Soft. She exhibits no distension. There is tenderness.   Mild tenderness to palpation R mid to LQ  LLQ is normal   Lymphadenopathy:     She has no cervical adenopathy.   Neurological: She is alert and oriented to person, place, and time.   Skin: No rash noted. No erythema.     No rash  skyler feet with some hyperpigmentation to her dorsal surface around mtps   Psychiatric: Mood normal.   Musculoskeletal: Normal range of motion. She exhibits no edema or deformity.   skyler wrists, mcps, pips no synvoitis, no tenderness, no warmth, good rom  Shoulders decreased rom with right shoulder with 90 abduction and left at about 45 degress  skyler knees no effusion, no warmth, no tenderness    Tenderness around her right mid back         Recent Results (from the past 840 hour(s))   Urinalysis    Collection Time: 01/23/19  7:24 AM   Result Value Ref Range    Specimen UA Urine, Clean Catch     Color, UA Yellow Yellow, Straw, Mary    Appearance, UA Clear Clear    pH, UA 6.0 5.0 - 8.0    Specific Gravity, UA 1.015 1.005 - 1.030    Protein, UA Negative Negative    Glucose, UA Negative Negative    Ketones, UA Negative Negative    Bilirubin (UA) Negative Negative    Occult Blood UA Negative Negative    Nitrite, " UA SEE COMMENT Negative    Urobilinogen, UA Negative <2.0 EU/dL    Leukocytes, UA Negative Negative   Urinalysis Microscopic    Collection Time: 01/23/19  7:24 AM   Result Value Ref Range    RBC, UA 2 0 - 4 /hpf    WBC, UA 1 0 - 5 /hpf    Bacteria, UA Occasional None-Occ /hpf    Squam Epithel, UA 2 /hpf    Microscopic Comment SEE COMMENT    CBC auto differential    Collection Time: 01/23/19  7:32 AM   Result Value Ref Range    WBC 5.81 3.90 - 12.70 K/uL    RBC 4.39 4.00 - 5.40 M/uL    Hemoglobin 13.5 12.0 - 16.0 g/dL    Hematocrit 42.4 37.0 - 48.5 %    MCV 97 82 - 98 fL    MCH 30.8 27.0 - 31.0 pg    MCHC 31.8 (L) 32.0 - 36.0 g/dL    RDW 12.7 11.5 - 14.5 %    Platelets 230 150 - 350 K/uL    MPV 8.8 (L) 9.2 - 12.9 fL    Gran # (ANC) 2.1 1.8 - 7.7 K/uL    Lymph # 2.9 1.0 - 4.8 K/uL    Mono # 0.7 0.3 - 1.0 K/uL    Eos # 0.1 0.0 - 0.5 K/uL    Baso # 0.02 0.00 - 0.20 K/uL    Gran% 36.9 (L) 38.0 - 73.0 %    Lymph% 49.2 (H) 18.0 - 48.0 %    Mono% 12.2 4.0 - 15.0 %    Eosinophil% 1.4 0.0 - 8.0 %    Basophil% 0.3 0.0 - 1.9 %    Differential Method Automated    Comprehensive metabolic panel    Collection Time: 01/23/19  7:32 AM   Result Value Ref Range    Sodium 141 136 - 145 mmol/L    Potassium 4.1 3.5 - 5.1 mmol/L    Chloride 107 95 - 110 mmol/L    CO2 25 23 - 29 mmol/L    Glucose 86 70 - 110 mg/dL    BUN, Bld 9 8 - 23 mg/dL    Creatinine 0.9 0.5 - 1.4 mg/dL    Calcium 10.0 8.7 - 10.5 mg/dL    Total Protein 8.2 6.0 - 8.4 g/dL    Albumin 3.8 3.5 - 5.2 g/dL    Total Bilirubin 0.6 0.1 - 1.0 mg/dL    Alkaline Phosphatase 53 (L) 55 - 135 U/L    AST 17 10 - 40 U/L    ALT 9 (L) 10 - 44 U/L    Anion Gap 9 8 - 16 mmol/L    eGFR if African American >60.0 >60 mL/min/1.73 m^2    eGFR if non African American >60.0 >60 mL/min/1.73 m^2   C-reactive protein    Collection Time: 01/23/19  7:32 AM   Result Value Ref Range    CRP 1.4 0.0 - 8.2 mg/L   Sedimentation rate, manual    Collection Time: 01/23/19  7:32 AM   Result Value Ref Range     Sed Rate 9 0 - 20 mm/Hr   C3 complement    Collection Time: 01/23/19  7:32 AM   Result Value Ref Range    Complement (C-3) 122 50 - 180 mg/dL   C4 complement    Collection Time: 01/23/19  7:32 AM   Result Value Ref Range    Complement (C-4) 28 11 - 44 mg/dL   Vitamin D    Collection Time: 01/23/19  7:32 AM   Result Value Ref Range    Vit D, 25-Hydroxy 34 30 - 96 ng/mL          Dexa 3.11.15: RF -1.0, RN -2.2, spine -0.2, stable bmd  Dexa 7.25.18: RF -2.0, spine 0.1, stable bmd, frax major 9.9%, hip 1.9%  Assessment:       1. Systemic lupus erythematosus, unspecified SLE type, unspecified organ involvement status    2. Osteopenia, unspecified location    3. Medication monitoring encounter    4. Chronic pain of both shoulders    5. Raynaud's phenomenon without gangrene    6. Abdominal pain, unspecified abdominal location    7. Lumbar disc disease          Impression:  SLE: plaquenil 3 x week, stable, continues in remission with last few tamela's neg, dsdna and complements normal, no sign of SLE activity    Raynauds: stable, takes daily vit E, asa     AVN skyler shoulders: had planned replacements with dr. Ruiz, chronic pain but improved now and not an issue,     Back pain: she does have djd and ddd in her lumbar spine, takes flexeril nightly, exacerbated today right mid back     abd pain: sharp burning pain, no rash or lesions suggestive of shingles but consider this dx vs referred from back vs muscular    Medication monitoring; no signs of toxicity, sees eye doctor yearly    Osteopenia: on drug holiday (prev fosamax), dexa with stable bmd, no need for tx, vit D weekly for low levels    Low vit D: level 34 on weekly 50K units     Plan:       toradol shot today for her back pain 30mg  Make appt with pcp about her abd pain, unclear what's going on, discussed be on lookout for rash developing  Take gabapentin, she has this at home 300mg up to tid--see if helps with her back   No change to plaquenil 3 x week  Cont weekly vit  D  Cont f/u with ortho for shoulders prn  Cont yearly eye checks  Repeat dexa 2021 (2-3 years)      Return to clinic in 6 months with dsdna, reg 4, urine, complements,early, sooner if needed

## 2019-01-30 ENCOUNTER — OFFICE VISIT (OUTPATIENT)
Dept: RHEUMATOLOGY | Facility: CLINIC | Age: 69
End: 2019-01-30
Payer: MEDICARE

## 2019-01-30 VITALS
DIASTOLIC BLOOD PRESSURE: 72 MMHG | HEIGHT: 64 IN | WEIGHT: 132.5 LBS | BODY MASS INDEX: 22.62 KG/M2 | SYSTOLIC BLOOD PRESSURE: 130 MMHG | HEART RATE: 74 BPM

## 2019-01-30 DIAGNOSIS — M25.512 CHRONIC PAIN OF BOTH SHOULDERS: ICD-10-CM

## 2019-01-30 DIAGNOSIS — G89.29 CHRONIC PAIN OF BOTH SHOULDERS: ICD-10-CM

## 2019-01-30 DIAGNOSIS — I73.00 RAYNAUD'S PHENOMENON WITHOUT GANGRENE: ICD-10-CM

## 2019-01-30 DIAGNOSIS — R10.9 ABDOMINAL PAIN, UNSPECIFIED ABDOMINAL LOCATION: ICD-10-CM

## 2019-01-30 DIAGNOSIS — Z51.81 MEDICATION MONITORING ENCOUNTER: ICD-10-CM

## 2019-01-30 DIAGNOSIS — M32.9 SYSTEMIC LUPUS ERYTHEMATOSUS, UNSPECIFIED SLE TYPE, UNSPECIFIED ORGAN INVOLVEMENT STATUS: Primary | ICD-10-CM

## 2019-01-30 DIAGNOSIS — M85.80 OSTEOPENIA, UNSPECIFIED LOCATION: ICD-10-CM

## 2019-01-30 DIAGNOSIS — M25.511 CHRONIC PAIN OF BOTH SHOULDERS: ICD-10-CM

## 2019-01-30 DIAGNOSIS — M51.9 LUMBAR DISC DISEASE: ICD-10-CM

## 2019-01-30 PROCEDURE — 1101F PR PT FALLS ASSESS DOC 0-1 FALLS W/OUT INJ PAST YR: ICD-10-PCS | Mod: CPTII,S$GLB,, | Performed by: PHYSICIAN ASSISTANT

## 2019-01-30 PROCEDURE — 96372 PR INJECTION,THERAP/PROPH/DIAG2ST, IM OR SUBCUT: ICD-10-PCS | Mod: S$GLB,,, | Performed by: PHYSICIAN ASSISTANT

## 2019-01-30 PROCEDURE — 96372 THER/PROPH/DIAG INJ SC/IM: CPT | Mod: S$GLB,,, | Performed by: PHYSICIAN ASSISTANT

## 2019-01-30 PROCEDURE — 99999 PR PBB SHADOW E&M-EST. PATIENT-LVL IV: CPT | Mod: PBBFAC,,, | Performed by: PHYSICIAN ASSISTANT

## 2019-01-30 PROCEDURE — 1101F PT FALLS ASSESS-DOCD LE1/YR: CPT | Mod: CPTII,S$GLB,, | Performed by: PHYSICIAN ASSISTANT

## 2019-01-30 PROCEDURE — 99999 PR PBB SHADOW E&M-EST. PATIENT-LVL IV: ICD-10-PCS | Mod: PBBFAC,,, | Performed by: PHYSICIAN ASSISTANT

## 2019-01-30 PROCEDURE — 99214 OFFICE O/P EST MOD 30 MIN: CPT | Mod: 25,S$GLB,, | Performed by: PHYSICIAN ASSISTANT

## 2019-01-30 PROCEDURE — 99214 PR OFFICE/OUTPT VISIT, EST, LEVL IV, 30-39 MIN: ICD-10-PCS | Mod: 25,S$GLB,, | Performed by: PHYSICIAN ASSISTANT

## 2019-01-30 RX ORDER — KETOROLAC TROMETHAMINE 30 MG/ML
30 INJECTION, SOLUTION INTRAMUSCULAR; INTRAVENOUS
Status: COMPLETED | OUTPATIENT
Start: 2019-01-30 | End: 2019-01-30

## 2019-01-30 RX ADMIN — KETOROLAC TROMETHAMINE 30 MG: 30 INJECTION, SOLUTION INTRAMUSCULAR; INTRAVENOUS at 10:01

## 2019-01-30 NOTE — PATIENT INSTRUCTIONS
Lupus still in remission   Watch for any rash to appear on your stomach, worry about shingles    toradol shot today, anti inflammation shot  Flexeril and heat for back     No change to plaquenil or vit D

## 2019-01-30 NOTE — PROGRESS NOTES
Administered 1cc Toradol 30mg/cc to RUOQGluteal. Pt. Tolerated well. No acute reaction noted to site. Pt. Instructed on S/S to report. Pt. Verbalized understanding. Pt waited 15 minutes.    Lot: -[id number]  Exp:12/19

## 2019-07-24 ENCOUNTER — LAB VISIT (OUTPATIENT)
Dept: LAB | Facility: HOSPITAL | Age: 69
End: 2019-07-24
Attending: FAMILY MEDICINE
Payer: MEDICARE

## 2019-07-24 DIAGNOSIS — M32.19 OTHER SYSTEMIC LUPUS ERYTHEMATOSUS WITH OTHER ORGAN INVOLVEMENT: ICD-10-CM

## 2019-07-24 DIAGNOSIS — Z51.81 MEDICATION MONITORING ENCOUNTER: ICD-10-CM

## 2019-07-24 DIAGNOSIS — M32.9 SYSTEMIC LUPUS ERYTHEMATOSUS, UNSPECIFIED SLE TYPE, UNSPECIFIED ORGAN INVOLVEMENT STATUS: ICD-10-CM

## 2019-07-24 LAB
ALBUMIN SERPL BCP-MCNC: 3.7 G/DL (ref 3.5–5.2)
ALP SERPL-CCNC: 63 U/L (ref 55–135)
ALT SERPL W/O P-5'-P-CCNC: 7 U/L (ref 10–44)
ANION GAP SERPL CALC-SCNC: 8 MMOL/L (ref 8–16)
AST SERPL-CCNC: 18 U/L (ref 10–40)
BASOPHILS # BLD AUTO: 0.02 K/UL (ref 0–0.2)
BASOPHILS NFR BLD: 0.3 % (ref 0–1.9)
BILIRUB SERPL-MCNC: 1.1 MG/DL (ref 0.1–1)
BUN SERPL-MCNC: 12 MG/DL (ref 8–23)
C3 SERPL-MCNC: 140 MG/DL (ref 50–180)
C4 SERPL-MCNC: 33 MG/DL (ref 11–44)
CALCIUM SERPL-MCNC: 9.7 MG/DL (ref 8.7–10.5)
CHLORIDE SERPL-SCNC: 108 MMOL/L (ref 95–110)
CO2 SERPL-SCNC: 26 MMOL/L (ref 23–29)
CREAT SERPL-MCNC: 0.8 MG/DL (ref 0.5–1.4)
CRP SERPL-MCNC: 2.2 MG/L (ref 0–8.2)
DIFFERENTIAL METHOD: ABNORMAL
EOSINOPHIL # BLD AUTO: 0.1 K/UL (ref 0–0.5)
EOSINOPHIL NFR BLD: 1.2 % (ref 0–8)
ERYTHROCYTE [DISTWIDTH] IN BLOOD BY AUTOMATED COUNT: 12.6 % (ref 11.5–14.5)
ERYTHROCYTE [SEDIMENTATION RATE] IN BLOOD BY WESTERGREN METHOD: 11 MM/HR (ref 0–20)
EST. GFR  (AFRICAN AMERICAN): >60 ML/MIN/1.73 M^2
EST. GFR  (NON AFRICAN AMERICAN): >60 ML/MIN/1.73 M^2
GLUCOSE SERPL-MCNC: 86 MG/DL (ref 70–110)
HCT VFR BLD AUTO: 42.4 % (ref 37–48.5)
HGB BLD-MCNC: 13.8 G/DL (ref 12–16)
LYMPHOCYTES # BLD AUTO: 3.3 K/UL (ref 1–4.8)
LYMPHOCYTES NFR BLD: 49.3 % (ref 18–48)
MCH RBC QN AUTO: 31.4 PG (ref 27–31)
MCHC RBC AUTO-ENTMCNC: 32.5 G/DL (ref 32–36)
MCV RBC AUTO: 97 FL (ref 82–98)
MONOCYTES # BLD AUTO: 0.9 K/UL (ref 0.3–1)
MONOCYTES NFR BLD: 12.9 % (ref 4–15)
NEUTROPHILS # BLD AUTO: 2.4 K/UL (ref 1.8–7.7)
NEUTROPHILS NFR BLD: 36.3 % (ref 38–73)
PLATELET # BLD AUTO: 246 K/UL (ref 150–350)
PMV BLD AUTO: 8.4 FL (ref 9.2–12.9)
POTASSIUM SERPL-SCNC: 4.5 MMOL/L (ref 3.5–5.1)
PROT SERPL-MCNC: 8.2 G/DL (ref 6–8.4)
RBC # BLD AUTO: 4.39 M/UL (ref 4–5.4)
SODIUM SERPL-SCNC: 142 MMOL/L (ref 136–145)
WBC # BLD AUTO: 6.67 K/UL (ref 3.9–12.7)

## 2019-07-24 PROCEDURE — 86140 C-REACTIVE PROTEIN: CPT

## 2019-07-24 PROCEDURE — 80053 COMPREHEN METABOLIC PANEL: CPT

## 2019-07-24 PROCEDURE — 85025 COMPLETE CBC W/AUTO DIFF WBC: CPT | Mod: PO

## 2019-07-24 PROCEDURE — 36415 COLL VENOUS BLD VENIPUNCTURE: CPT | Mod: PO

## 2019-07-24 PROCEDURE — 86225 DNA ANTIBODY NATIVE: CPT

## 2019-07-24 PROCEDURE — 86160 COMPLEMENT ANTIGEN: CPT | Mod: 59

## 2019-07-24 PROCEDURE — 86160 COMPLEMENT ANTIGEN: CPT

## 2019-07-24 PROCEDURE — 85651 RBC SED RATE NONAUTOMATED: CPT | Mod: PO

## 2019-07-25 LAB — DSDNA AB SER-ACNC: NORMAL [IU]/ML

## 2019-07-30 NOTE — PROGRESS NOTES
"Subjective:       Patient ID: Alice Loza is a 68 y.o. female.    Chief Complaint: SLE, osteopoenia.    Alice is here for f/u. We see her for SLE, recently has been in remission but has been significant in the past.  +Raynauds stable on asa and vit e.  She has AVN both shoulders, had seen Dr. Ruiz, was planning replacement at some point for both shoulders.  However recently they have felt fine, no issues with pain which is great so no surgery planned.  Regarding her lupus, she is on plaquenil 3 times a week. Last several ANAs have been negative as has the dsdna, complements have been normal. utd with eye checks    She also has a h/o osteopenia and is currently on bisphosphonate holiday (fosamax).  H/o low vitamin D and takes 50K units weekly, vit D normal range.  Dexa with stable bmd.     She denies any fevers, hair loss, sob, new rashes (does have some hyperpigmentation on the tops of her feet around the 1 mtp), no joint swelling.  Has known djd and ddd in her lumbar spine.   C/o pain in her right hand, with weakness at times, states her whole hand is numb in the mornings.     Review of Systems   Constitutional: Positive for fatigue. Negative for chills and fever.   HENT: Negative for mouth sores, rhinorrhea and sore throat.    Eyes: Negative for pain and redness.   Respiratory: Negative for cough and shortness of breath.    Cardiovascular: Negative for chest pain.   Gastrointestinal: Positive for abdominal pain. Negative for constipation, diarrhea, nausea and vomiting.   Genitourinary: Negative for dysuria and hematuria.   Musculoskeletal: Positive for arthralgias and back pain. Negative for joint swelling and myalgias.        Left hip replacement   Skin: Negative for rash.   Neurological: Negative for weakness, numbness and headaches.   Psychiatric/Behavioral: The patient is not nervous/anxious.          Objective:   /81   Pulse 72   Ht 5' 4" (1.626 m)   Wt 62.1 kg (136 lb 14.5 oz)   BMI " 23.50 kg/m²      Physical Exam   Constitutional: She is oriented to person, place, and time and well-developed, well-nourished, and in no distress.   HENT:   Head: Normocephalic and atraumatic.   Eyes: Pupils are equal, round, and reactive to light. Right eye exhibits no discharge.   Neck: Normal range of motion.   Cardiovascular: Normal rate, regular rhythm and normal heart sounds.  Exam reveals no friction rub.    Pulmonary/Chest: Effort normal and breath sounds normal. No respiratory distress.   Abdominal: Soft. She exhibits no distension. There is no tenderness.   Lymphadenopathy:     She has no cervical adenopathy.   Neurological: She is alert and oriented to person, place, and time.   Skin: No rash noted. No erythema.     No rash  skyler feet with some hyperpigmentation to her dorsal surface around mtps   Psychiatric: Mood normal.   Musculoskeletal: Normal range of motion. She exhibits no edema or deformity.   skyler wrists, mcps, pips no synvoitis, no tenderness, no warmth, good rom  Shoulders decreased rom with right shoulder with 90 abduction and left at about 45 degress  skyler knees no effusion, no warmth, no tenderness    Right hand  strength 4/5  Left hand  5/5    Neg tinels        Recent Results (from the past 840 hour(s))   Urinalysis    Collection Time: 07/24/19  8:30 AM   Result Value Ref Range    Specimen UA Urine, Clean Catch     Color, UA Yellow Yellow, Straw, Mary    Appearance, UA Clear Clear    pH, UA 6.0 5.0 - 8.0    Specific Gravity, UA 1.025 1.005 - 1.030    Protein, UA Negative Negative    Glucose, UA Negative Negative    Ketones, UA Negative Negative    Bilirubin (UA) Negative Negative    Occult Blood UA 1+ (A) Negative    Nitrite, UA Negative Negative    Leukocytes, UA Negative Negative   Urinalysis Microscopic    Collection Time: 07/24/19  8:30 AM   Result Value Ref Range    RBC, UA 12 (H) 0 - 4 /hpf    WBC, UA 1 0 - 5 /hpf    Bacteria Moderate (A) None-Occ /hpf    Squam Epithel, UA 18  /hpf    Microscopic Comment SEE COMMENT    CBC auto differential    Collection Time: 07/24/19  8:43 AM   Result Value Ref Range    WBC 6.67 3.90 - 12.70 K/uL    RBC 4.39 4.00 - 5.40 M/uL    Hemoglobin 13.8 12.0 - 16.0 g/dL    Hematocrit 42.4 37.0 - 48.5 %    Mean Corpuscular Volume 97 82 - 98 fL    Mean Corpuscular Hemoglobin 31.4 (H) 27.0 - 31.0 pg    Mean Corpuscular Hemoglobin Conc 32.5 32.0 - 36.0 g/dL    RDW 12.6 11.5 - 14.5 %    Platelets 246 150 - 350 K/uL    MPV 8.4 (L) 9.2 - 12.9 fL    Gran # (ANC) 2.4 1.8 - 7.7 K/uL    Lymph # 3.3 1.0 - 4.8 K/uL    Mono # 0.9 0.3 - 1.0 K/uL    Eos # 0.1 0.0 - 0.5 K/uL    Baso # 0.02 0.00 - 0.20 K/uL    Gran% 36.3 (L) 38.0 - 73.0 %    Lymph% 49.3 (H) 18.0 - 48.0 %    Mono% 12.9 4.0 - 15.0 %    Eosinophil% 1.2 0.0 - 8.0 %    Basophil% 0.3 0.0 - 1.9 %    Differential Method Automated    Comprehensive metabolic panel    Collection Time: 07/24/19  8:43 AM   Result Value Ref Range    Sodium 142 136 - 145 mmol/L    Potassium 4.5 3.5 - 5.1 mmol/L    Chloride 108 95 - 110 mmol/L    CO2 26 23 - 29 mmol/L    Glucose 86 70 - 110 mg/dL    BUN, Bld 12 8 - 23 mg/dL    Creatinine 0.8 0.5 - 1.4 mg/dL    Calcium 9.7 8.7 - 10.5 mg/dL    Total Protein 8.2 6.0 - 8.4 g/dL    Albumin 3.7 3.5 - 5.2 g/dL    Total Bilirubin 1.1 (H) 0.1 - 1.0 mg/dL    Alkaline Phosphatase 63 55 - 135 U/L    AST 18 10 - 40 U/L    ALT 7 (L) 10 - 44 U/L    Anion Gap 8 8 - 16 mmol/L    eGFR if African American >60.0 >60 mL/min/1.73 m^2    eGFR if non African American >60.0 >60 mL/min/1.73 m^2   C-reactive protein    Collection Time: 07/24/19  8:43 AM   Result Value Ref Range    CRP 2.2 0.0 - 8.2 mg/L   Sedimentation rate, manual    Collection Time: 07/24/19  8:43 AM   Result Value Ref Range    Sed Rate 11 0 - 20 mm/Hr   C3 complement    Collection Time: 07/24/19  8:43 AM   Result Value Ref Range    Complement (C-3) 140 50 - 180 mg/dL   C4 complement    Collection Time: 07/24/19  8:43 AM   Result Value Ref Range     Complement (C-4) 33 11 - 44 mg/dL   Anti-DNA antibody, double-stranded    Collection Time: 07/24/19  8:43 AM   Result Value Ref Range    ds DNA Ab Negative 1:10 Negative 1:10          Dexa 3.11.15: RF -1.0, RN -2.2, spine -0.2, stable bmd  Dexa 7.25.18: RF -2.0, spine 0.1, stable bmd, frax major 9.9%, hip 1.9%  Assessment:       1. Systemic lupus erythematosus, unspecified SLE type, unspecified organ involvement status    2. Chronic pain of both shoulders    3. Raynaud's phenomenon without gangrene    4. Lumbar disc disease    5. Vitamin D deficiency    6. Right hand pain    7. Medication monitoring encounter    8. Osteopenia of multiple sites          Impression:  Right hand pain: w weakness and am numbness, entire hand,  no evidence of inflammation, consider cts vs cervical radiculopathy    SLE: plaquenil 3 x week, stable, continues in remission with last few tamela's neg, dsdna and complements normal, no sign of SLE activity    Raynauds: stable, takes daily vit E, asa     AVN skyler shoulders: had planned replacements with dr. Ruiz, chronic pain but improved now and not an issue,     Back pain: she does have djd and ddd in her lumbar spine, takes flexeril nightly, on gabapentin    Medication monitoring; no signs of toxicity, sees eye doctor yearly    Osteopenia: on drug holiday (prev fosamax), dexa with stable bmd, no need for tx, vit D weekly for low levels    Low vit D: level 34 on weekly 50K units     Plan:         No change to plaquenil 3 x week  Cont weekly vit D  Cont f/u with ortho for shoulders prn  Cont yearly eye checks  Repeat dexa 2021 (2-3 years)  Try mobic for hand pain, weakness would like EMG study but she wants to wait   Start taking gabapentin 300-600mg nightly       Return to clinic in 6 months with dsdna, reg 4, urine, complements,early, sooner if needed  emg sooner if she is ready

## 2019-07-31 ENCOUNTER — OFFICE VISIT (OUTPATIENT)
Dept: RHEUMATOLOGY | Facility: CLINIC | Age: 69
End: 2019-07-31
Payer: MEDICARE

## 2019-07-31 VITALS
SYSTOLIC BLOOD PRESSURE: 110 MMHG | WEIGHT: 136.88 LBS | HEIGHT: 64 IN | HEART RATE: 72 BPM | BODY MASS INDEX: 23.37 KG/M2 | DIASTOLIC BLOOD PRESSURE: 81 MMHG

## 2019-07-31 DIAGNOSIS — G89.29 CHRONIC PAIN OF BOTH SHOULDERS: ICD-10-CM

## 2019-07-31 DIAGNOSIS — M25.511 CHRONIC PAIN OF BOTH SHOULDERS: ICD-10-CM

## 2019-07-31 DIAGNOSIS — M51.9 LUMBAR DISC DISEASE: ICD-10-CM

## 2019-07-31 DIAGNOSIS — M85.89 OSTEOPENIA OF MULTIPLE SITES: ICD-10-CM

## 2019-07-31 DIAGNOSIS — I73.00 RAYNAUD'S PHENOMENON WITHOUT GANGRENE: ICD-10-CM

## 2019-07-31 DIAGNOSIS — M25.512 CHRONIC PAIN OF BOTH SHOULDERS: ICD-10-CM

## 2019-07-31 DIAGNOSIS — E55.9 VITAMIN D DEFICIENCY: ICD-10-CM

## 2019-07-31 DIAGNOSIS — Z51.81 MEDICATION MONITORING ENCOUNTER: ICD-10-CM

## 2019-07-31 DIAGNOSIS — M32.9 SYSTEMIC LUPUS ERYTHEMATOSUS, UNSPECIFIED SLE TYPE, UNSPECIFIED ORGAN INVOLVEMENT STATUS: Primary | ICD-10-CM

## 2019-07-31 DIAGNOSIS — M79.641 RIGHT HAND PAIN: ICD-10-CM

## 2019-07-31 PROCEDURE — 99214 OFFICE O/P EST MOD 30 MIN: CPT | Mod: S$PBB,,, | Performed by: PHYSICIAN ASSISTANT

## 2019-07-31 PROCEDURE — 99214 PR OFFICE/OUTPT VISIT, EST, LEVL IV, 30-39 MIN: ICD-10-PCS | Mod: S$PBB,,, | Performed by: PHYSICIAN ASSISTANT

## 2019-07-31 PROCEDURE — 99214 OFFICE O/P EST MOD 30 MIN: CPT | Mod: PBBFAC | Performed by: PHYSICIAN ASSISTANT

## 2019-07-31 PROCEDURE — 99999 PR PBB SHADOW E&M-EST. PATIENT-LVL IV: CPT | Mod: PBBFAC,,, | Performed by: PHYSICIAN ASSISTANT

## 2019-07-31 PROCEDURE — 99999 PR PBB SHADOW E&M-EST. PATIENT-LVL IV: ICD-10-PCS | Mod: PBBFAC,,, | Performed by: PHYSICIAN ASSISTANT

## 2019-07-31 RX ORDER — MELOXICAM 15 MG/1
15 TABLET ORAL DAILY
Qty: 30 TABLET | Refills: 5 | Status: SHIPPED | OUTPATIENT
Start: 2019-07-31 | End: 2021-08-26 | Stop reason: SDUPTHER

## 2019-07-31 NOTE — PATIENT INSTRUCTIONS
Take gabapentin nightly take 1-2  Capsules      meloxicam 15mg daily as needed for pain new rx    Cont plaquenil 3x week     Yearly eye checks    Labs look good     May get emg nerve study, let me know if you want

## 2020-01-29 ENCOUNTER — LAB VISIT (OUTPATIENT)
Dept: LAB | Facility: HOSPITAL | Age: 70
End: 2020-01-29
Attending: FAMILY MEDICINE
Payer: MEDICARE

## 2020-01-29 DIAGNOSIS — M32.9 SYSTEMIC LUPUS ERYTHEMATOSUS, UNSPECIFIED SLE TYPE, UNSPECIFIED ORGAN INVOLVEMENT STATUS: ICD-10-CM

## 2020-01-29 DIAGNOSIS — M32.19 OTHER SYSTEMIC LUPUS ERYTHEMATOSUS WITH OTHER ORGAN INVOLVEMENT: ICD-10-CM

## 2020-01-29 DIAGNOSIS — Z51.81 MEDICATION MONITORING ENCOUNTER: ICD-10-CM

## 2020-01-29 LAB
ALBUMIN SERPL BCP-MCNC: 3.9 G/DL (ref 3.5–5.2)
ALP SERPL-CCNC: 72 U/L (ref 55–135)
ALT SERPL W/O P-5'-P-CCNC: 12 U/L (ref 10–44)
ANION GAP SERPL CALC-SCNC: 11 MMOL/L (ref 8–16)
AST SERPL-CCNC: 19 U/L (ref 10–40)
BASOPHILS # BLD AUTO: 0.03 K/UL (ref 0–0.2)
BASOPHILS NFR BLD: 0.5 % (ref 0–1.9)
BILIRUB SERPL-MCNC: 0.6 MG/DL (ref 0.1–1)
BUN SERPL-MCNC: 14 MG/DL (ref 8–23)
C3 SERPL-MCNC: 151 MG/DL (ref 50–180)
C4 SERPL-MCNC: 38 MG/DL (ref 11–44)
CALCIUM SERPL-MCNC: 9.8 MG/DL (ref 8.7–10.5)
CHLORIDE SERPL-SCNC: 105 MMOL/L (ref 95–110)
CO2 SERPL-SCNC: 28 MMOL/L (ref 23–29)
CREAT SERPL-MCNC: 0.9 MG/DL (ref 0.5–1.4)
CRP SERPL-MCNC: 4.5 MG/L (ref 0–8.2)
DIFFERENTIAL METHOD: ABNORMAL
EOSINOPHIL # BLD AUTO: 0.1 K/UL (ref 0–0.5)
EOSINOPHIL NFR BLD: 1.5 % (ref 0–8)
ERYTHROCYTE [DISTWIDTH] IN BLOOD BY AUTOMATED COUNT: 12.1 % (ref 11.5–14.5)
ERYTHROCYTE [SEDIMENTATION RATE] IN BLOOD BY WESTERGREN METHOD: 21 MM/HR (ref 0–20)
EST. GFR  (AFRICAN AMERICAN): >60 ML/MIN/1.73 M^2
EST. GFR  (NON AFRICAN AMERICAN): >60 ML/MIN/1.73 M^2
GLUCOSE SERPL-MCNC: 87 MG/DL (ref 70–110)
HCT VFR BLD AUTO: 43.5 % (ref 37–48.5)
HGB BLD-MCNC: 13.7 G/DL (ref 12–16)
LYMPHOCYTES # BLD AUTO: 3.2 K/UL (ref 1–4.8)
LYMPHOCYTES NFR BLD: 48 % (ref 18–48)
MCH RBC QN AUTO: 29.9 PG (ref 27–31)
MCHC RBC AUTO-ENTMCNC: 31.5 G/DL (ref 32–36)
MCV RBC AUTO: 95 FL (ref 82–98)
MONOCYTES # BLD AUTO: 0.7 K/UL (ref 0.3–1)
MONOCYTES NFR BLD: 11 % (ref 4–15)
NEUTROPHILS # BLD AUTO: 2.6 K/UL (ref 1.8–7.7)
NEUTROPHILS NFR BLD: 39 % (ref 38–73)
PLATELET # BLD AUTO: 267 K/UL (ref 150–350)
PMV BLD AUTO: 8.2 FL (ref 9.2–12.9)
POTASSIUM SERPL-SCNC: 4.3 MMOL/L (ref 3.5–5.1)
PROT SERPL-MCNC: 8.7 G/DL (ref 6–8.4)
RBC # BLD AUTO: 4.58 M/UL (ref 4–5.4)
SODIUM SERPL-SCNC: 144 MMOL/L (ref 136–145)
WBC # BLD AUTO: 6.63 K/UL (ref 3.9–12.7)

## 2020-01-29 PROCEDURE — 85651 RBC SED RATE NONAUTOMATED: CPT | Mod: PO

## 2020-01-29 PROCEDURE — 80053 COMPREHEN METABOLIC PANEL: CPT

## 2020-01-29 PROCEDURE — 86160 COMPLEMENT ANTIGEN: CPT

## 2020-01-29 PROCEDURE — 86160 COMPLEMENT ANTIGEN: CPT | Mod: 59

## 2020-01-29 PROCEDURE — 36415 COLL VENOUS BLD VENIPUNCTURE: CPT | Mod: PO

## 2020-01-29 PROCEDURE — 86225 DNA ANTIBODY NATIVE: CPT

## 2020-01-29 PROCEDURE — 85025 COMPLETE CBC W/AUTO DIFF WBC: CPT | Mod: PO

## 2020-01-29 PROCEDURE — 86140 C-REACTIVE PROTEIN: CPT

## 2020-02-03 LAB — DSDNA AB SER-ACNC: NORMAL [IU]/ML

## 2020-02-04 ENCOUNTER — OFFICE VISIT (OUTPATIENT)
Dept: FAMILY MEDICINE | Facility: CLINIC | Age: 70
End: 2020-02-04
Payer: MEDICARE

## 2020-02-04 ENCOUNTER — HOSPITAL ENCOUNTER (OUTPATIENT)
Dept: RADIOLOGY | Facility: HOSPITAL | Age: 70
Discharge: HOME | End: 2020-02-04
Attending: FAMILY MEDICINE
Payer: MEDICARE

## 2020-02-04 VITALS — WEIGHT: 143.94 LBS | HEIGHT: 64 IN | BODY MASS INDEX: 24.57 KG/M2

## 2020-02-04 VITALS
HEIGHT: 64 IN | DIASTOLIC BLOOD PRESSURE: 62 MMHG | SYSTOLIC BLOOD PRESSURE: 114 MMHG | BODY MASS INDEX: 24.59 KG/M2 | TEMPERATURE: 98 F | WEIGHT: 144 LBS | HEART RATE: 76 BPM

## 2020-02-04 DIAGNOSIS — Z13.6 ENCOUNTER FOR SCREENING FOR CARDIOVASCULAR DISORDERS: ICD-10-CM

## 2020-02-04 DIAGNOSIS — Z12.31 ENCOUNTER FOR SCREENING MAMMOGRAM FOR MALIGNANT NEOPLASM OF BREAST: ICD-10-CM

## 2020-02-04 DIAGNOSIS — Z00.00 ROUTINE HISTORY AND PHYSICAL EXAMINATION OF ADULT: ICD-10-CM

## 2020-02-04 DIAGNOSIS — Z00.00 ROUTINE HISTORY AND PHYSICAL EXAMINATION OF ADULT: Primary | ICD-10-CM

## 2020-02-04 DIAGNOSIS — M87.029 AVASCULAR NECROSIS OF HEAD OF HUMERUS, UNSPECIFIED LATERALITY: ICD-10-CM

## 2020-02-04 DIAGNOSIS — M85.80 OSTEOPENIA, UNSPECIFIED LOCATION: ICD-10-CM

## 2020-02-04 DIAGNOSIS — M32.19 OTHER SYSTEMIC LUPUS ERYTHEMATOSUS WITH OTHER ORGAN INVOLVEMENT: ICD-10-CM

## 2020-02-04 DIAGNOSIS — Z13.220 LIPID SCREENING: ICD-10-CM

## 2020-02-04 DIAGNOSIS — D84.9 IMMUNOCOMPROMISED: ICD-10-CM

## 2020-02-04 PROCEDURE — 99397 PER PM REEVAL EST PAT 65+ YR: CPT | Mod: S$PBB,25,, | Performed by: FAMILY MEDICINE

## 2020-02-04 PROCEDURE — G0009 ADMIN PNEUMOCOCCAL VACCINE: HCPCS | Mod: PBBFAC,PO

## 2020-02-04 PROCEDURE — 77067 MAMMO DIGITAL SCREENING BILAT WITH TOMOSYNTHESIS_CAD: ICD-10-PCS | Mod: 26,,, | Performed by: RADIOLOGY

## 2020-02-04 PROCEDURE — 99397 PR PREVENTIVE VISIT,EST,65 & OVER: ICD-10-PCS | Mod: S$PBB,25,, | Performed by: FAMILY MEDICINE

## 2020-02-04 PROCEDURE — 99999 PR PBB SHADOW E&M-EST. PATIENT-LVL III: CPT | Mod: PBBFAC,,, | Performed by: FAMILY MEDICINE

## 2020-02-04 PROCEDURE — 90662 IIV NO PRSV INCREASED AG IM: CPT | Mod: PBBFAC,PO

## 2020-02-04 PROCEDURE — 77067 SCR MAMMO BI INCL CAD: CPT | Mod: 26,,, | Performed by: RADIOLOGY

## 2020-02-04 PROCEDURE — 77067 SCR MAMMO BI INCL CAD: CPT | Mod: TC,PO

## 2020-02-04 PROCEDURE — 99999 PR PBB SHADOW E&M-EST. PATIENT-LVL III: ICD-10-PCS | Mod: PBBFAC,,, | Performed by: FAMILY MEDICINE

## 2020-02-04 PROCEDURE — 77063 BREAST TOMOSYNTHESIS BI: CPT | Mod: 26,,, | Performed by: RADIOLOGY

## 2020-02-04 PROCEDURE — 99213 OFFICE O/P EST LOW 20 MIN: CPT | Mod: PBBFAC,PO | Performed by: FAMILY MEDICINE

## 2020-02-04 PROCEDURE — 77063 MAMMO DIGITAL SCREENING BILAT WITH TOMOSYNTHESIS_CAD: ICD-10-PCS | Mod: 26,,, | Performed by: RADIOLOGY

## 2020-02-04 NOTE — PROGRESS NOTES
Patient presents physical exam.  Due for mammogram and lipid screening.  Osteopenia followed by Rheumatology currently on vacation from bisphosphonate.  She has lupus followed by Rheumatology currently well controlled.  Previous AVN at the shoulder currently stable.    Alice was seen today for annual exam.    Diagnoses and all orders for this visit:    Routine history and physical examination of adult  -     Mammo Digital Screening Bilat; Future  -     Lipid panel; Future    Encounter for screening mammogram for malignant neoplasm of breast  -     Mammo Digital Screening Bilat; Future    Lipid screening  -     Lipid panel; Future    Encounter for screening for cardiovascular disorders   -     Lipid panel; Future    Osteopenia, unspecified location    Other systemic lupus erythematosus with other organ involvement    Avascular necrosis of head of humerus, unspecified laterality    Immunocompromised    Other orders  -     Influenza - High Dose (65+) (PF) (IM)  -     Pneumococcal Polysaccharide Vaccine (23 Valent) (SQ/IM)     Reviewed recent laboratory from rheumatologist.  Continue follow-up Rheumatology.  Recommend Tdap, shingles vaccine at pharmacy.      Anticipatory guidance: Don't smoke.  Healthy diet and regular exercise recommended.          Past Medical History:  Past Medical History:   Diagnosis Date    Avascular necrosis of hip     Left    Avascular necrosis of humeral head     Right    Cataract     Lumbar disc disease     Lupus     Osteoarthritis     Osteopenia     Osteoporosis     Raynaud phenomenon     Systemic lupus erythematosus     Tubular adenoma of colon 09/20/2016    Unspecified vitamin D deficiency      Past Surgical History:   Procedure Laterality Date    CHOLECYSTECTOMY      COLONOSCOPY N/A 9/20/2016    Procedure: COLONOSCOPY;  Surgeon: Aroldo Raphael MD;  Location: [facility];  Service: Endoscopy;  Laterality: N/A;    TOTAL HIP ARTHROPLASTY      Left     Review of patient's  allergies indicates:   Allergen Reactions    No known drug allergies      Current Outpatient Medications on File Prior to Visit   Medication Sig Dispense Refill    cyclobenzaprine (FLEXERIL) 10 MG tablet Take 1 tablet (10 mg total) by mouth every evening. 90 tablet 3    ergocalciferol (ERGOCALCIFEROL) 50,000 unit Cap Take 1 capsule (50,000 Units total) by mouth every 7 days. 12 capsule 3    hydroxychloroquine (PLAQUENIL) 200 mg tablet Take one tablet by  Mouth three times a week 36 tablet 3    meloxicam (MOBIC) 15 MG tablet Take 1 tablet (15 mg total) by mouth once daily. As needed for pain 30 tablet 5    nystatin (MYCOSTATIN) powder Apply topically Twice daily.      vitamin E 400 UNIT capsule Take 1 capsule (400 Units total) by mouth once daily.      [DISCONTINUED] HYDROCODONE/ACETAMINOPHEN (NORCO ORAL) Take by mouth.      desonide (DESOWEN) 0.05 % cream Apply topically 2 (two) times daily. for 10 days 60 g 2    gabapentin (NEURONTIN) 300 MG capsule Take 1 capsule (300 mg total) by mouth 3 (three) times daily. 90 capsule 11    triamcinolone acetonide 0.1% (KENALOG) 0.1 % cream Apply topically 2 (two) times daily. Thin film to area on face for 7 days 15 g 2    [DISCONTINUED] hyoscyamine (ANASPAZ,LEVSIN) 0.125 mg Tab Take 1 tablet (125 mcg total) by mouth every 4 (four) hours as needed. 30 tablet 0     No current facility-administered medications on file prior to visit.      Social History     Socioeconomic History    Marital status:      Spouse name: Not on file    Number of children: Not on file    Years of education: Not on file    Highest education level: Not on file   Occupational History    Not on file   Social Needs    Financial resource strain: Not on file    Food insecurity:     Worry: Not on file     Inability: Not on file    Transportation needs:     Medical: Not on file     Non-medical: Not on file   Tobacco Use    Smoking status: Never Smoker    Smokeless tobacco: Never Used  "  Substance and Sexual Activity    Alcohol use: No    Drug use: No    Sexual activity: Not Currently   Lifestyle    Physical activity:     Days per week: Not on file     Minutes per session: Not on file    Stress: Not on file   Relationships    Social connections:     Talks on phone: Not on file     Gets together: Not on file     Attends Restoration service: Not on file     Active member of club or organization: Not on file     Attends meetings of clubs or organizations: Not on file     Relationship status: Not on file   Other Topics Concern    Not on file   Social History Narrative    Not on file     Family History   Problem Relation Age of Onset    Cervical cancer Mother     Heart attack Father 62             ROS:  GENERAL: No fever, chills,  or significant weight changes.  HEENT: No headache or hearing complaints.  No dysphagia  Eyes: No vision complaints  CHEST: Denies KOROMA, cyanosis, wheezing, cough and sputum production.  CARDIOVASCULAR: Denies chest pain, PND, orthopnea or reduced exercise tolerance.  ABDOMEN: Appetite fine. Denies diarrhea, abdominal pain, hematemesis or blood in stool.  URINARY: No flank pain, dysuria or hematuria.  MUSCULOSKELETAL:  Lupus as above  NEUROLOGIC: No focal weakness numbness or paresthesia  PSYCHIATRIC: Denies depression        Vitals:    02/04/20 1033   BP: 114/62   Pulse: 76   Temp: 98 °F (36.7 °C)   Weight: 65.3 kg (144 lb)   Height: 5' 4" (1.626 m)     Wt Readings from Last 3 Encounters:   02/04/20 65.3 kg (144 lb)   07/31/19 62.1 kg (136 lb 14.5 oz)   01/30/19 60.1 kg (132 lb 7.9 oz)       OBJECTIVE:   APPEARANCE: Well nourished, well developed, in no acute distress.    HEAD: Normocephalic.  Atraumatic.  No sinus tenderness.  EYES:   Right eye: Pupil reactive.  Conjunctiva clear.    Left eye: Pupil reactive.  Conjunctiva clear.  EOMI.    EARS: TM's intact. Light reflex normal. No retraction or perforation.    NOSE:  clear.  MOUTH & THROAT:  No pharyngeal erythema or " exudate. No lesions.  NECK: Supple. No bruits.  No JVD.  No cervical lymphadenopathy.  No thyromegaly.    CHEST: Breath sounds clear bilaterally.  Normal respiratory effort  CARDIOVASCULAR: Normal rate.  Regular rhythm.  No murmurs.  No rub.  No gallops.  ABDOMEN: Bowel sounds normal.  Soft.  No tenderness.  No organomegaly.  PERIPHERAL VASCULAR: No cyanosis.  No clubbing.  No edema.  NEUROLOGIC: No focal findings.  MENTAL STATUS: Alert.  Oriented x 3.

## 2020-02-11 NOTE — PROGRESS NOTES
"Subjective:       Patient ID: Alice Loza is a 69 y.o. female.    Chief Complaint: SLE, osteopenia.    Alice is here for f/u. We see her for SLE, recently has been in remission but has been significant in the past.  +Raynauds stable on asa and vit e.  She has AVN both shoulders, had seen Dr. Ruiz, was planning replacement at some point for both shoulders.  However recently they have felt fine, no issues with pain which is great so no surgery planned.  Regarding her lupus, she is on plaquenil 3 times a week. Last several ANAs have been negative as has the dsdna, complements have been normal. utd with eye checks    She also has a h/o osteopenia and is currently on bisphosphonate holiday (fosamax).  H/o low vitamin D and takes 50K units weekly, vit D normal range.  Dexa with stable bmd 2018    She denies any fevers, hair loss, sob, new rashes (does have some hyperpigmentation on the tops of her feet around the 1 mtp), no joint swelling.  Has known djd and ddd in her lumbar spine.   Has carpal tunnel type symptoms R hand on and off.  Not wanting emg    Review of Systems   Constitutional: Positive for fatigue. Negative for chills and fever.   HENT: Negative for mouth sores, rhinorrhea and sore throat.    Eyes: Negative for pain and redness.   Respiratory: Negative for cough and shortness of breath.    Cardiovascular: Negative for chest pain.   Gastrointestinal: Positive for abdominal pain. Negative for constipation, diarrhea, nausea and vomiting.   Genitourinary: Negative for dysuria and hematuria.   Musculoskeletal: Positive for arthralgias and back pain. Negative for joint swelling and myalgias.        Left hip replacement   Skin: Negative for rash.   Neurological: Negative for weakness, numbness and headaches.   Psychiatric/Behavioral: The patient is not nervous/anxious.          Objective:   BP (!) 151/72   Pulse 81   Ht 5' 4" (1.626 m)   Wt 65.6 kg (144 lb 10 oz)   BMI 24.82 kg/m²      Physical Exam "   Constitutional: She is oriented to person, place, and time and well-developed, well-nourished, and in no distress.   HENT:   Head: Normocephalic and atraumatic.   Eyes: Pupils are equal, round, and reactive to light. Right eye exhibits no discharge.   Neck: Normal range of motion.   Cardiovascular: Normal rate, regular rhythm and normal heart sounds.  Exam reveals no friction rub.    Pulmonary/Chest: Effort normal and breath sounds normal. No respiratory distress.   Abdominal: Soft. She exhibits no distension. There is no tenderness.   Lymphadenopathy:     She has no cervical adenopathy.   Neurological: She is alert and oriented to person, place, and time.   Skin: No rash noted. No erythema.     No rash  skyler feet with some hyperpigmentation to her dorsal surface around mtps   Psychiatric: Mood normal.   Musculoskeletal: Normal range of motion. She exhibits no edema or deformity.   skyler wrists, mcps, pips no synvoitis, no tenderness, no warmth, good rom  Shoulders decreased rom with right shoulder with 90 abduction and left at about 45 degress  skyler knees no effusion, no warmth, no tenderness    Right hand  strength 4/5  Left hand  5/5    Neg tinels        Recent Results (from the past 840 hour(s))   Urinalysis    Collection Time: 01/29/20 10:04 AM   Result Value Ref Range    Specimen UA Urine, Clean Catch     Color, UA Yellow Yellow, Straw, Mary    Appearance, UA Clear Clear    pH, UA 7.0 5.0 - 8.0    Specific Gravity, UA 1.010 1.005 - 1.030    Protein, UA Negative Negative    Glucose, UA Negative Negative    Ketones, UA Negative Negative    Bilirubin (UA) Negative Negative    Occult Blood UA 1+ (A) Negative    Nitrite, UA Negative Negative    Leukocytes, UA Negative Negative   Urinalysis Microscopic    Collection Time: 01/29/20 10:04 AM   Result Value Ref Range    RBC, UA 2 0 - 4 /hpf    WBC, UA 1 0 - 5 /hpf    Bacteria Occasional None-Occ /hpf    Squam Epithel, UA 10 /hpf    Microscopic Comment SEE  COMMENT    CBC auto differential    Collection Time: 01/29/20 10:13 AM   Result Value Ref Range    WBC 6.63 3.90 - 12.70 K/uL    RBC 4.58 4.00 - 5.40 M/uL    Hemoglobin 13.7 12.0 - 16.0 g/dL    Hematocrit 43.5 37.0 - 48.5 %    Mean Corpuscular Volume 95 82 - 98 fL    Mean Corpuscular Hemoglobin 29.9 27.0 - 31.0 pg    Mean Corpuscular Hemoglobin Conc 31.5 (L) 32.0 - 36.0 g/dL    RDW 12.1 11.5 - 14.5 %    Platelets 267 150 - 350 K/uL    MPV 8.2 (L) 9.2 - 12.9 fL    Gran # (ANC) 2.6 1.8 - 7.7 K/uL    Lymph # 3.2 1.0 - 4.8 K/uL    Mono # 0.7 0.3 - 1.0 K/uL    Eos # 0.1 0.0 - 0.5 K/uL    Baso # 0.03 0.00 - 0.20 K/uL    Gran% 39.0 38.0 - 73.0 %    Lymph% 48.0 18.0 - 48.0 %    Mono% 11.0 4.0 - 15.0 %    Eosinophil% 1.5 0.0 - 8.0 %    Basophil% 0.5 0.0 - 1.9 %    Differential Method Automated    Comprehensive metabolic panel    Collection Time: 01/29/20 10:13 AM   Result Value Ref Range    Sodium 144 136 - 145 mmol/L    Potassium 4.3 3.5 - 5.1 mmol/L    Chloride 105 95 - 110 mmol/L    CO2 28 23 - 29 mmol/L    Glucose 87 70 - 110 mg/dL    BUN, Bld 14 8 - 23 mg/dL    Creatinine 0.9 0.5 - 1.4 mg/dL    Calcium 9.8 8.7 - 10.5 mg/dL    Total Protein 8.7 (H) 6.0 - 8.4 g/dL    Albumin 3.9 3.5 - 5.2 g/dL    Total Bilirubin 0.6 0.1 - 1.0 mg/dL    Alkaline Phosphatase 72 55 - 135 U/L    AST 19 10 - 40 U/L    ALT 12 10 - 44 U/L    Anion Gap 11 8 - 16 mmol/L    eGFR if African American >60.0 >60 mL/min/1.73 m^2    eGFR if non African American >60.0 >60 mL/min/1.73 m^2   C-reactive protein    Collection Time: 01/29/20 10:13 AM   Result Value Ref Range    CRP 4.5 0.0 - 8.2 mg/L   Sedimentation rate, manual    Collection Time: 01/29/20 10:13 AM   Result Value Ref Range    Sed Rate 21 (H) 0 - 20 mm/Hr   C3 complement    Collection Time: 01/29/20 10:13 AM   Result Value Ref Range    Complement (C-3) 151 50 - 180 mg/dL   C4 complement    Collection Time: 01/29/20 10:13 AM   Result Value Ref Range    Complement (C-4) 38 11 - 44 mg/dL    Anti-DNA antibody, double-stranded    Collection Time: 01/29/20 10:13 AM   Result Value Ref Range    ds DNA Ab Negative 1:10 Negative 1:10          Dexa 3.11.15: RF -1.0, RN -2.2, spine -0.2, stable bmd  Dexa 7.25.18: RF -2.0, spine 0.1, stable bmd, frax major 9.9%, hip 1.9%  Assessment:       1. Systemic lupus erythematosus, unspecified SLE type, unspecified organ involvement status    2. Chronic pain of both shoulders    3. Lumbar disc disease    4. Vitamin D deficiency    5. Medication monitoring encounter    6. Osteopenia of multiple sites          Impression:      SLE: plaquenil 3 x week, stable, continues in remission with last few tamela's neg, dsdna and complements normal, no sign of SLE activity    Raynauds: stable, takes daily vit E, asa     AVN skyler shoulders: had planned replacements with dr. Ruiz, chronic pain but improved now and not an issue,     Back pain: she does have djd and ddd in her lumbar spine, takes flexeril nightly, on gabapentin prn    Hi risk Medication monitoring; no signs of toxicity, sees eye doctor yearly    Osteopenia: on drug holiday (prev fosamax), dexa with stable bmd, no need for tx, vit D weekly for low levels    Low vit D: level 34 on weekly 50K units     Plan:       Labs reviewed w patient and stable   No change to plaquenil 3 x week  Cont weekly vit D  Cont f/u with ortho for shoulders prn  Cont yearly eye checks  Repeat dexa end of this year   mobic prn   gabapentin 300-600mg nightly prn       Return to clinic in 6 months with dsdna, reg 4, urine, complements,early, sooner if needed; dexa next visit

## 2020-02-12 ENCOUNTER — LAB VISIT (OUTPATIENT)
Dept: LAB | Facility: HOSPITAL | Age: 70
End: 2020-02-12
Attending: FAMILY MEDICINE
Payer: MEDICARE

## 2020-02-12 ENCOUNTER — OFFICE VISIT (OUTPATIENT)
Dept: RHEUMATOLOGY | Facility: CLINIC | Age: 70
End: 2020-02-12
Payer: MEDICARE

## 2020-02-12 VITALS
BODY MASS INDEX: 24.69 KG/M2 | HEART RATE: 81 BPM | SYSTOLIC BLOOD PRESSURE: 151 MMHG | DIASTOLIC BLOOD PRESSURE: 72 MMHG | HEIGHT: 64 IN | WEIGHT: 144.63 LBS

## 2020-02-12 DIAGNOSIS — M25.511 CHRONIC PAIN OF BOTH SHOULDERS: ICD-10-CM

## 2020-02-12 DIAGNOSIS — M32.9 SYSTEMIC LUPUS ERYTHEMATOSUS, UNSPECIFIED SLE TYPE, UNSPECIFIED ORGAN INVOLVEMENT STATUS: Primary | ICD-10-CM

## 2020-02-12 DIAGNOSIS — M25.512 CHRONIC PAIN OF BOTH SHOULDERS: ICD-10-CM

## 2020-02-12 DIAGNOSIS — G89.29 CHRONIC PAIN OF BOTH SHOULDERS: ICD-10-CM

## 2020-02-12 DIAGNOSIS — I73.00 RAYNAUD'S PHENOMENON WITHOUT GANGRENE: ICD-10-CM

## 2020-02-12 DIAGNOSIS — M51.9 LUMBAR DISC DISEASE: ICD-10-CM

## 2020-02-12 DIAGNOSIS — Z00.00 ROUTINE HISTORY AND PHYSICAL EXAMINATION OF ADULT: ICD-10-CM

## 2020-02-12 DIAGNOSIS — M85.89 OSTEOPENIA OF MULTIPLE SITES: ICD-10-CM

## 2020-02-12 DIAGNOSIS — Z13.6 ENCOUNTER FOR SCREENING FOR CARDIOVASCULAR DISORDERS: ICD-10-CM

## 2020-02-12 DIAGNOSIS — E55.9 VITAMIN D DEFICIENCY: ICD-10-CM

## 2020-02-12 DIAGNOSIS — Z13.220 LIPID SCREENING: ICD-10-CM

## 2020-02-12 DIAGNOSIS — Z51.81 MEDICATION MONITORING ENCOUNTER: ICD-10-CM

## 2020-02-12 PROCEDURE — 99999 PR PBB SHADOW E&M-EST. PATIENT-LVL III: CPT | Mod: PBBFAC,,, | Performed by: PHYSICIAN ASSISTANT

## 2020-02-12 PROCEDURE — 99999 PR PBB SHADOW E&M-EST. PATIENT-LVL III: ICD-10-PCS | Mod: PBBFAC,,, | Performed by: PHYSICIAN ASSISTANT

## 2020-02-12 PROCEDURE — 99214 OFFICE O/P EST MOD 30 MIN: CPT | Mod: S$PBB,,, | Performed by: PHYSICIAN ASSISTANT

## 2020-02-12 PROCEDURE — 99213 OFFICE O/P EST LOW 20 MIN: CPT | Mod: PBBFAC | Performed by: PHYSICIAN ASSISTANT

## 2020-02-12 PROCEDURE — 99214 PR OFFICE/OUTPT VISIT, EST, LEVL IV, 30-39 MIN: ICD-10-PCS | Mod: S$PBB,,, | Performed by: PHYSICIAN ASSISTANT

## 2020-02-12 PROCEDURE — 36415 COLL VENOUS BLD VENIPUNCTURE: CPT

## 2020-02-12 PROCEDURE — 80061 LIPID PANEL: CPT

## 2020-02-12 NOTE — PATIENT INSTRUCTIONS
Mobic/meloxicam is the anti inflammatory, take when needed, one pill daily     Gabapentin ok to use as needed     Plaquenil 3x week     Cont eye checks     Vit D weekly     dexa scan end of this year

## 2020-02-13 LAB
CHOLEST SERPL-MCNC: 232 MG/DL (ref 120–199)
CHOLEST/HDLC SERPL: 2.7 {RATIO} (ref 2–5)
HDLC SERPL-MCNC: 87 MG/DL (ref 40–75)
HDLC SERPL: 37.5 % (ref 20–50)
LDLC SERPL CALC-MCNC: 130.8 MG/DL (ref 63–159)
NONHDLC SERPL-MCNC: 145 MG/DL
TRIGL SERPL-MCNC: 71 MG/DL (ref 30–150)

## 2020-04-17 DIAGNOSIS — M32.19 OTHER SYSTEMIC LUPUS ERYTHEMATOSUS WITH OTHER ORGAN INVOLVEMENT: ICD-10-CM

## 2020-04-17 NOTE — TELEPHONE ENCOUNTER
----- Message from Rosalinda Davis sent at 4/17/2020  2:00 PM CDT -----  Contact: pt  1. What is the name of the medication you are requesting? Madeline    2. What is the dose? .  3. How do you take the medication? Orally, topically, etc? .  4. How often do you take this medication? .  5. Do you need a 30 day or 90 day supply? .  6. How many refills are you requesting?   7. What is your preferred pharmacy and location of the pharmacy? [facility]   8. Who can we contact with further questions?.

## 2020-04-20 RX ORDER — HYDROXYCHLOROQUINE SULFATE 200 MG/1
TABLET, FILM COATED ORAL
Qty: 36 TABLET | Refills: 3 | Status: SHIPPED | OUTPATIENT
Start: 2020-04-20 | End: 2021-02-25

## 2020-08-05 ENCOUNTER — LAB VISIT (OUTPATIENT)
Dept: LAB | Facility: HOSPITAL | Age: 70
End: 2020-08-05
Attending: PHYSICIAN ASSISTANT
Payer: MEDICARE

## 2020-08-05 DIAGNOSIS — M32.19 OTHER SYSTEMIC LUPUS ERYTHEMATOSUS WITH OTHER ORGAN INVOLVEMENT: ICD-10-CM

## 2020-08-05 DIAGNOSIS — M32.9 SYSTEMIC LUPUS ERYTHEMATOSUS, UNSPECIFIED SLE TYPE, UNSPECIFIED ORGAN INVOLVEMENT STATUS: ICD-10-CM

## 2020-08-05 DIAGNOSIS — Z51.81 MEDICATION MONITORING ENCOUNTER: ICD-10-CM

## 2020-08-05 LAB
ALBUMIN SERPL BCP-MCNC: 3.8 G/DL (ref 3.5–5.2)
ALP SERPL-CCNC: 55 U/L (ref 55–135)
ALT SERPL W/O P-5'-P-CCNC: 6 U/L (ref 10–44)
ANION GAP SERPL CALC-SCNC: 7 MMOL/L (ref 8–16)
AST SERPL-CCNC: 16 U/L (ref 10–40)
BASOPHILS # BLD AUTO: 0.03 K/UL (ref 0–0.2)
BASOPHILS NFR BLD: 0.6 % (ref 0–1.9)
BILIRUB SERPL-MCNC: 1 MG/DL (ref 0.1–1)
BUN SERPL-MCNC: 11 MG/DL (ref 8–23)
C3 SERPL-MCNC: 126 MG/DL (ref 50–180)
C4 SERPL-MCNC: 31 MG/DL (ref 11–44)
CALCIUM SERPL-MCNC: 9.4 MG/DL (ref 8.7–10.5)
CHLORIDE SERPL-SCNC: 109 MMOL/L (ref 95–110)
CO2 SERPL-SCNC: 25 MMOL/L (ref 23–29)
CREAT SERPL-MCNC: 0.8 MG/DL (ref 0.5–1.4)
CRP SERPL-MCNC: 2.3 MG/L (ref 0–8.2)
DIFFERENTIAL METHOD: ABNORMAL
EOSINOPHIL # BLD AUTO: 0.1 K/UL (ref 0–0.5)
EOSINOPHIL NFR BLD: 1.7 % (ref 0–8)
ERYTHROCYTE [DISTWIDTH] IN BLOOD BY AUTOMATED COUNT: 12.9 % (ref 11.5–14.5)
ERYTHROCYTE [SEDIMENTATION RATE] IN BLOOD BY WESTERGREN METHOD: 9 MM/HR (ref 0–20)
EST. GFR  (AFRICAN AMERICAN): >60 ML/MIN/1.73 M^2
EST. GFR  (NON AFRICAN AMERICAN): >60 ML/MIN/1.73 M^2
GLUCOSE SERPL-MCNC: 74 MG/DL (ref 70–110)
HCT VFR BLD AUTO: 41.5 % (ref 37–48.5)
HGB BLD-MCNC: 13.8 G/DL (ref 12–16)
IMM GRANULOCYTES # BLD AUTO: 0.02 K/UL (ref 0–0.04)
IMM GRANULOCYTES NFR BLD AUTO: 0.4 % (ref 0–0.5)
LYMPHOCYTES # BLD AUTO: 2.3 K/UL (ref 1–4.8)
LYMPHOCYTES NFR BLD: 43.2 % (ref 18–48)
MCH RBC QN AUTO: 32.1 PG (ref 27–31)
MCHC RBC AUTO-ENTMCNC: 33.3 G/DL (ref 32–36)
MCV RBC AUTO: 97 FL (ref 82–98)
MONOCYTES # BLD AUTO: 0.6 K/UL (ref 0.3–1)
MONOCYTES NFR BLD: 10.9 % (ref 4–15)
NEUTROPHILS # BLD AUTO: 2.3 K/UL (ref 1.8–7.7)
NEUTROPHILS NFR BLD: 43.2 % (ref 38–73)
NRBC BLD-RTO: 0 /100 WBC
PLATELET # BLD AUTO: 219 K/UL (ref 150–350)
PMV BLD AUTO: 8.5 FL (ref 9.2–12.9)
POTASSIUM SERPL-SCNC: 4.1 MMOL/L (ref 3.5–5.1)
PROT SERPL-MCNC: 8 G/DL (ref 6–8.4)
RBC # BLD AUTO: 4.3 M/UL (ref 4–5.4)
SODIUM SERPL-SCNC: 141 MMOL/L (ref 136–145)
WBC # BLD AUTO: 5.32 K/UL (ref 3.9–12.7)

## 2020-08-05 PROCEDURE — 85025 COMPLETE CBC W/AUTO DIFF WBC: CPT | Mod: PO

## 2020-08-05 PROCEDURE — 86140 C-REACTIVE PROTEIN: CPT

## 2020-08-05 PROCEDURE — 36415 COLL VENOUS BLD VENIPUNCTURE: CPT | Mod: PO

## 2020-08-05 PROCEDURE — 86160 COMPLEMENT ANTIGEN: CPT | Mod: 59

## 2020-08-05 PROCEDURE — 80053 COMPREHEN METABOLIC PANEL: CPT

## 2020-08-05 PROCEDURE — 86225 DNA ANTIBODY NATIVE: CPT

## 2020-08-05 PROCEDURE — 86160 COMPLEMENT ANTIGEN: CPT

## 2020-08-05 PROCEDURE — 85651 RBC SED RATE NONAUTOMATED: CPT | Mod: PO

## 2020-08-06 LAB — DSDNA AB SER-ACNC: NORMAL [IU]/ML

## 2020-08-11 ENCOUNTER — TELEPHONE (OUTPATIENT)
Dept: RHEUMATOLOGY | Facility: CLINIC | Age: 70
End: 2020-08-11

## 2020-08-11 NOTE — TELEPHONE ENCOUNTER
Confirmed 8.12.20 appts with deyanira whitlock, advised pt to come up to 4th floor and let registration know that she is here after dexa scan, Verbalized understanding.

## 2020-08-11 NOTE — PROGRESS NOTES
Subjective:       Patient ID: Alice Loza is a 69 y.o. female.    Chief Complaint: SLE, osteopenia    Alice is here for f/u. We see her for SLE, recently has been in remission but has been significant in the past.  +Raynauds stable on asa and vit e.  She has AVN both shoulders, had seen Dr. Ruiz, was planning replacement at some point for both shoulders.  However recently they have felt fine, no issues with pain which is great so no surgery planned.  Regarding her lupus, she is on plaquenil 3 times a week. Last several ANAs have been negative as has the dsdna, complements have been normal. utd with eye checks    She also has a h/o osteopenia and is currently on bisphosphonate holiday (fosamax).  H/o low vitamin D and takes 50K units weekly, vit D normal range.  Dexa with stable bmd 2020    She denies any fevers, hair loss, sob, new rashes (does have some hyperpigmentation on the tops of her feet around the 1 mtp), no joint swelling.  Has known djd and ddd in her lumbar spine.   Has carpal tunnel type symptoms R hand on and off.  Not wanting emg    She c/o her legs hurting and getting tired when she walks, and c/o her feet darker this is a new issue.  C/o leg swelling as well.  She is concerned about these new symptoms     Review of Systems   Constitutional: Positive for fatigue. Negative for chills and fever.   HENT: Negative for mouth sores, rhinorrhea and sore throat.    Eyes: Negative for pain and redness.   Respiratory: Negative for cough and shortness of breath.    Cardiovascular: Positive for leg swelling. Negative for chest pain.   Gastrointestinal: Positive for abdominal pain. Negative for constipation, diarrhea, nausea and vomiting.   Genitourinary: Negative for dysuria and hematuria.   Musculoskeletal: Positive for arthralgias and back pain. Negative for joint swelling and myalgias.        Left hip replacement   Skin: Positive for color change (skyler feet with darker skin color). Negative for  "rash.   Neurological: Negative for weakness, numbness and headaches.   Psychiatric/Behavioral: The patient is not nervous/anxious.          Objective:   /72   Pulse 72   Ht 5' 4" (1.626 m)   Wt 62.1 kg (136 lb 14.5 oz)   BMI 23.50 kg/m²      Physical Exam   Constitutional: She is oriented to person, place, and time and well-developed, well-nourished, and in no distress.   HENT:   Head: Normocephalic and atraumatic.   Eyes: Pupils are equal, round, and reactive to light. Right eye exhibits no discharge.   Neck: Normal range of motion.   Cardiovascular: Normal rate, regular rhythm and normal heart sounds.  Exam reveals no friction rub.    Pulmonary/Chest: Effort normal and breath sounds normal. No respiratory distress.   Abdominal: Soft. She exhibits no distension. There is no abdominal tenderness.   Lymphadenopathy:     She has no cervical adenopathy.   Neurological: She is alert and oriented to person, place, and time.   Skin: No rash noted. No erythema.     No rash  skyler feet with some hyperpigmentation to her dorsal surface around mtps   Psychiatric: Mood normal.   Musculoskeletal: Normal range of motion. No deformity or edema.      Comments: skyler wrists, mcps, pips no synvoitis, no tenderness, no warmth, good rom  Shoulders decreased rom with right shoulder with 90 abduction and left at about 45 degress  skyler knees no effusion, no warmth, no tenderness    Right hand  strength 4/5  Left hand  5/5    Neg tinels        Recent Results (from the past 840 hour(s))   Urinalysis    Collection Time: 08/05/20  9:08 AM   Result Value Ref Range    Specimen UA Urine, Clean Catch     Color, UA Yellow Yellow, Straw, Mary    Appearance, UA Clear Clear    pH, UA 6.0 5.0 - 8.0    Specific Gravity, UA 1.020 1.005 - 1.030    Protein, UA Negative Negative    Glucose, UA Negative Negative    Ketones, UA Negative Negative    Bilirubin (UA) Negative Negative    Occult Blood UA 1+ (A) Negative    Nitrite, UA Negative " Negative    Leukocytes, UA Trace (A) Negative   Urinalysis Microscopic    Collection Time: 08/05/20  9:08 AM   Result Value Ref Range    RBC, UA 2 0 - 4 /hpf    WBC, UA 5 0 - 5 /hpf    Bacteria Few (A) None-Occ /hpf    Squam Epithel, UA 6 /hpf    Microscopic Comment SEE COMMENT    CBC auto differential    Collection Time: 08/05/20 10:00 AM   Result Value Ref Range    WBC 5.32 3.90 - 12.70 K/uL    RBC 4.30 4.00 - 5.40 M/uL    Hemoglobin 13.8 12.0 - 16.0 g/dL    Hematocrit 41.5 37.0 - 48.5 %    Mean Corpuscular Volume 97 82 - 98 fL    Mean Corpuscular Hemoglobin 32.1 (H) 27.0 - 31.0 pg    Mean Corpuscular Hemoglobin Conc 33.3 32.0 - 36.0 g/dL    RDW 12.9 11.5 - 14.5 %    Platelets 219 150 - 350 K/uL    MPV 8.5 (L) 9.2 - 12.9 fL    Immature Granulocytes 0.4 0.0 - 0.5 %    Gran # (ANC) 2.3 1.8 - 7.7 K/uL    Immature Grans (Abs) 0.02 0.00 - 0.04 K/uL    Lymph # 2.3 1.0 - 4.8 K/uL    Mono # 0.6 0.3 - 1.0 K/uL    Eos # 0.1 0.0 - 0.5 K/uL    Baso # 0.03 0.00 - 0.20 K/uL    nRBC 0 0 /100 WBC    Gran% 43.2 38.0 - 73.0 %    Lymph% 43.2 18.0 - 48.0 %    Mono% 10.9 4.0 - 15.0 %    Eosinophil% 1.7 0.0 - 8.0 %    Basophil% 0.6 0.0 - 1.9 %    Differential Method Automated    Comprehensive metabolic panel    Collection Time: 08/05/20 10:00 AM   Result Value Ref Range    Sodium 141 136 - 145 mmol/L    Potassium 4.1 3.5 - 5.1 mmol/L    Chloride 109 95 - 110 mmol/L    CO2 25 23 - 29 mmol/L    Glucose 74 70 - 110 mg/dL    BUN, Bld 11 8 - 23 mg/dL    Creatinine 0.8 0.5 - 1.4 mg/dL    Calcium 9.4 8.7 - 10.5 mg/dL    Total Protein 8.0 6.0 - 8.4 g/dL    Albumin 3.8 3.5 - 5.2 g/dL    Total Bilirubin 1.0 0.1 - 1.0 mg/dL    Alkaline Phosphatase 55 55 - 135 U/L    AST 16 10 - 40 U/L    ALT 6 (L) 10 - 44 U/L    Anion Gap 7 (L) 8 - 16 mmol/L    eGFR if African American >60.0 >60 mL/min/1.73 m^2    eGFR if non African American >60.0 >60 mL/min/1.73 m^2   C-reactive protein    Collection Time: 08/05/20 10:00 AM   Result Value Ref Range    CRP 2.3  0.0 - 8.2 mg/L   Sedimentation rate, manual    Collection Time: 08/05/20 10:00 AM   Result Value Ref Range    Sed Rate 9 0 - 20 mm/Hr   C3 complement    Collection Time: 08/05/20 10:00 AM   Result Value Ref Range    Complement (C-3) 126 50 - 180 mg/dL   C4 complement    Collection Time: 08/05/20 10:00 AM   Result Value Ref Range    Complement (C-4) 31 11 - 44 mg/dL   Anti-DNA antibody, double-stranded    Collection Time: 08/05/20 10:00 AM   Result Value Ref Range    ds DNA Ab Negative 1:10 Negative 1:10          Dexa 3.11.15: RF -1.0, RN -2.2, spine -0.2, stable bmd  Dexa 7.25.18: RF -2.0, spine 0.1, stable bmd, frax major 9.9%, hip 1.9%  Dexa 8/12/20: RF -1.6, spine 0.1, frax major 9%, hip 2% stable spine, improved hip   Assessment:       1. Systemic lupus erythematosus, unspecified SLE type, unspecified organ involvement status    2. Vitamin D deficiency    3. Raynaud's phenomenon without gangrene    4. Chronic pain of both shoulders    5. Medication monitoring encounter    6. Osteopenia of multiple sites    7. Lumbar disc disease          Impression:      SLE: plaquenil 3 x week, stable, continues in remission with last few tamela's neg, dsdna and complements normal, no sign of SLE activity    Raynauds: stable, takes daily vit E, asa     AVN skyler shoulders: had planned replacements with dr. Ruiz, chronic pain but improved now and not an issue,     Back pain: she does have djd and ddd in her lumbar spine, takes flexeril nightly, on gabapentin prn    Hi risk Medication monitoring; no signs of toxicity, sees eye doctor yearly    Osteopenia: on drug holiday (prev fosamax), dexa with stable bmd, no need for tx, vit D weekly for low levels, cont drug holiday     Low vit D: level 34 on weekly 50K units     ble claudication, swelling: worsening issue    Plan:       Labs and dexa reviewed w patient and stable   No change to plaquenil 3 x week  Cont weekly vit D  Cont f/u with ortho for shoulders prn, stable now  Cont yearly  eye checks  No need to resume antiresorptive meds, cont fosamax holiday,  repeat dexa 8/12/2022  mobic prn   gabapentin 300-600mg nightly prn not really taking   Refilled flexeril, this helps her the prem  Refer to vascular to evaluate her BLE claudication     Return to clinic in 6 months with dsdna, reg 4, urine, complements,early, sooner if needed;

## 2020-08-12 ENCOUNTER — OFFICE VISIT (OUTPATIENT)
Dept: RHEUMATOLOGY | Facility: CLINIC | Age: 70
End: 2020-08-12
Payer: MEDICARE

## 2020-08-12 ENCOUNTER — APPOINTMENT (OUTPATIENT)
Dept: RADIOLOGY | Facility: HOSPITAL | Age: 70
End: 2020-08-12
Attending: PHYSICIAN ASSISTANT
Payer: MEDICARE

## 2020-08-12 VITALS
WEIGHT: 136.88 LBS | HEART RATE: 72 BPM | DIASTOLIC BLOOD PRESSURE: 72 MMHG | HEIGHT: 64 IN | BODY MASS INDEX: 23.37 KG/M2 | SYSTOLIC BLOOD PRESSURE: 130 MMHG

## 2020-08-12 DIAGNOSIS — E55.9 VITAMIN D DEFICIENCY: ICD-10-CM

## 2020-08-12 DIAGNOSIS — Z51.81 MEDICATION MONITORING ENCOUNTER: ICD-10-CM

## 2020-08-12 DIAGNOSIS — M87.022 AVASCULAR NECROSIS OF HUMERAL HEAD, LEFT: ICD-10-CM

## 2020-08-12 DIAGNOSIS — M25.512 CHRONIC PAIN OF BOTH SHOULDERS: ICD-10-CM

## 2020-08-12 DIAGNOSIS — I73.9 CLAUDICATION OF BOTH LOWER EXTREMITIES: ICD-10-CM

## 2020-08-12 DIAGNOSIS — G89.29 CHRONIC PAIN OF BOTH SHOULDERS: ICD-10-CM

## 2020-08-12 DIAGNOSIS — M85.89 OSTEOPENIA OF MULTIPLE SITES: ICD-10-CM

## 2020-08-12 DIAGNOSIS — I73.00 RAYNAUD'S PHENOMENON WITHOUT GANGRENE: ICD-10-CM

## 2020-08-12 DIAGNOSIS — E55.9 VITAMIN D DEFICIENCY: Primary | ICD-10-CM

## 2020-08-12 DIAGNOSIS — E55.9 HYPOVITAMINOSIS D: ICD-10-CM

## 2020-08-12 DIAGNOSIS — M51.9 LUMBAR DISC DISEASE: ICD-10-CM

## 2020-08-12 DIAGNOSIS — M32.19 OTHER SYSTEMIC LUPUS ERYTHEMATOSUS WITH OTHER ORGAN INVOLVEMENT: ICD-10-CM

## 2020-08-12 DIAGNOSIS — M25.511 CHRONIC PAIN OF BOTH SHOULDERS: ICD-10-CM

## 2020-08-12 PROCEDURE — 99999 PR PBB SHADOW E&M-EST. PATIENT-LVL IV: ICD-10-PCS | Mod: PBBFAC,,, | Performed by: PHYSICIAN ASSISTANT

## 2020-08-12 PROCEDURE — 99214 PR OFFICE/OUTPT VISIT, EST, LEVL IV, 30-39 MIN: ICD-10-PCS | Mod: S$PBB,,, | Performed by: PHYSICIAN ASSISTANT

## 2020-08-12 PROCEDURE — 77080 DXA BONE DENSITY AXIAL: CPT | Mod: 26,,, | Performed by: RADIOLOGY

## 2020-08-12 PROCEDURE — 99214 OFFICE O/P EST MOD 30 MIN: CPT | Mod: PBBFAC,25 | Performed by: PHYSICIAN ASSISTANT

## 2020-08-12 PROCEDURE — 77080 DXA BONE DENSITY AXIAL: CPT | Mod: TC

## 2020-08-12 PROCEDURE — 99999 PR PBB SHADOW E&M-EST. PATIENT-LVL IV: CPT | Mod: PBBFAC,,, | Performed by: PHYSICIAN ASSISTANT

## 2020-08-12 PROCEDURE — 99214 OFFICE O/P EST MOD 30 MIN: CPT | Mod: S$PBB,,, | Performed by: PHYSICIAN ASSISTANT

## 2020-08-12 PROCEDURE — 77080 DEXA BONE DENSITY SPINE HIP: ICD-10-PCS | Mod: 26,,, | Performed by: RADIOLOGY

## 2020-08-12 RX ORDER — NYSTATIN 100000 [USP'U]/G
POWDER TOPICAL
Qty: 60 G | Refills: 3 | Status: SHIPPED | OUTPATIENT
Start: 2020-08-12 | End: 2022-06-01 | Stop reason: SDUPTHER

## 2020-08-12 RX ORDER — ERGOCALCIFEROL 1.25 MG/1
50000 CAPSULE ORAL
Qty: 12 CAPSULE | Refills: 3 | Status: SHIPPED | OUTPATIENT
Start: 2020-08-12 | End: 2021-08-26 | Stop reason: SDUPTHER

## 2020-08-12 RX ORDER — TRIAMCINOLONE ACETONIDE 1 MG/G
CREAM TOPICAL 2 TIMES DAILY
Qty: 15 G | Refills: 2 | Status: SHIPPED | OUTPATIENT
Start: 2020-08-12 | End: 2021-02-25 | Stop reason: SDUPTHER

## 2020-08-12 RX ORDER — CYCLOBENZAPRINE HCL 10 MG
10 TABLET ORAL NIGHTLY
Qty: 90 TABLET | Refills: 3 | Status: SHIPPED | OUTPATIENT
Start: 2020-08-12 | End: 2021-08-26 | Stop reason: SDUPTHER

## 2020-08-12 NOTE — PATIENT INSTRUCTIONS
All looks good labwork wise     Vascular consult for your leg swelling/pain    Continue plaquenil 3x day     Bone density looks good   Cont weekly vit D     See back in 6 mos with early lab

## 2021-02-10 ENCOUNTER — LAB VISIT (OUTPATIENT)
Dept: LAB | Facility: HOSPITAL | Age: 71
End: 2021-02-10
Attending: PHYSICIAN ASSISTANT
Payer: MEDICARE

## 2021-02-10 DIAGNOSIS — M32.19 OTHER SYSTEMIC LUPUS ERYTHEMATOSUS WITH OTHER ORGAN INVOLVEMENT: ICD-10-CM

## 2021-02-10 DIAGNOSIS — Z51.81 MEDICATION MONITORING ENCOUNTER: ICD-10-CM

## 2021-02-10 LAB
ALBUMIN SERPL BCP-MCNC: 3.7 G/DL (ref 3.5–5.2)
ALP SERPL-CCNC: 74 U/L (ref 55–135)
ALT SERPL W/O P-5'-P-CCNC: 11 U/L (ref 10–44)
ANION GAP SERPL CALC-SCNC: 8 MMOL/L (ref 8–16)
AST SERPL-CCNC: 21 U/L (ref 10–40)
BASOPHILS # BLD AUTO: 0.04 K/UL (ref 0–0.2)
BASOPHILS NFR BLD: 0.6 % (ref 0–1.9)
BILIRUB SERPL-MCNC: 2 MG/DL (ref 0.1–1)
BUN SERPL-MCNC: 9 MG/DL (ref 8–23)
C3 SERPL-MCNC: 142 MG/DL (ref 50–180)
C4 SERPL-MCNC: 36 MG/DL (ref 11–44)
CALCIUM SERPL-MCNC: 9.2 MG/DL (ref 8.7–10.5)
CHLORIDE SERPL-SCNC: 108 MMOL/L (ref 95–110)
CO2 SERPL-SCNC: 25 MMOL/L (ref 23–29)
CREAT SERPL-MCNC: 0.9 MG/DL (ref 0.5–1.4)
CRP SERPL-MCNC: 12.8 MG/L (ref 0–8.2)
DIFFERENTIAL METHOD: ABNORMAL
EOSINOPHIL # BLD AUTO: 0.2 K/UL (ref 0–0.5)
EOSINOPHIL NFR BLD: 2.6 % (ref 0–8)
ERYTHROCYTE [DISTWIDTH] IN BLOOD BY AUTOMATED COUNT: 12.3 % (ref 11.5–14.5)
ERYTHROCYTE [SEDIMENTATION RATE] IN BLOOD BY WESTERGREN METHOD: 12 MM/HR (ref 0–20)
EST. GFR  (AFRICAN AMERICAN): >60 ML/MIN/1.73 M^2
EST. GFR  (NON AFRICAN AMERICAN): >60 ML/MIN/1.73 M^2
GLUCOSE SERPL-MCNC: 83 MG/DL (ref 70–110)
HCT VFR BLD AUTO: 44.1 % (ref 37–48.5)
HGB BLD-MCNC: 14.4 G/DL (ref 12–16)
IMM GRANULOCYTES # BLD AUTO: 0.02 K/UL (ref 0–0.04)
IMM GRANULOCYTES NFR BLD AUTO: 0.3 % (ref 0–0.5)
LYMPHOCYTES # BLD AUTO: 2.3 K/UL (ref 1–4.8)
LYMPHOCYTES NFR BLD: 32.9 % (ref 18–48)
MCH RBC QN AUTO: 31.8 PG (ref 27–31)
MCHC RBC AUTO-ENTMCNC: 32.7 G/DL (ref 32–36)
MCV RBC AUTO: 97 FL (ref 82–98)
MONOCYTES # BLD AUTO: 0.7 K/UL (ref 0.3–1)
MONOCYTES NFR BLD: 10.8 % (ref 4–15)
NEUTROPHILS # BLD AUTO: 3.7 K/UL (ref 1.8–7.7)
NEUTROPHILS NFR BLD: 53.1 % (ref 38–73)
NRBC BLD-RTO: 0 /100 WBC
PLATELET # BLD AUTO: 219 K/UL (ref 150–350)
PMV BLD AUTO: 8.6 FL (ref 9.2–12.9)
POTASSIUM SERPL-SCNC: 4 MMOL/L (ref 3.5–5.1)
PROT SERPL-MCNC: 8.2 G/DL (ref 6–8.4)
RBC # BLD AUTO: 4.53 M/UL (ref 4–5.4)
SODIUM SERPL-SCNC: 141 MMOL/L (ref 136–145)
WBC # BLD AUTO: 6.87 K/UL (ref 3.9–12.7)

## 2021-02-10 PROCEDURE — 85651 RBC SED RATE NONAUTOMATED: CPT | Mod: PO

## 2021-02-10 PROCEDURE — 80053 COMPREHEN METABOLIC PANEL: CPT

## 2021-02-10 PROCEDURE — 86140 C-REACTIVE PROTEIN: CPT

## 2021-02-10 PROCEDURE — 36415 COLL VENOUS BLD VENIPUNCTURE: CPT | Mod: PO

## 2021-02-10 PROCEDURE — 86160 COMPLEMENT ANTIGEN: CPT | Mod: 59

## 2021-02-10 PROCEDURE — 85025 COMPLETE CBC W/AUTO DIFF WBC: CPT | Mod: PO

## 2021-02-10 PROCEDURE — 86160 COMPLEMENT ANTIGEN: CPT

## 2021-02-24 ENCOUNTER — TELEPHONE (OUTPATIENT)
Dept: RHEUMATOLOGY | Facility: CLINIC | Age: 71
End: 2021-02-24

## 2021-02-25 ENCOUNTER — OFFICE VISIT (OUTPATIENT)
Dept: RHEUMATOLOGY | Facility: CLINIC | Age: 71
End: 2021-02-25
Payer: MEDICARE

## 2021-02-25 VITALS
WEIGHT: 141.75 LBS | DIASTOLIC BLOOD PRESSURE: 78 MMHG | BODY MASS INDEX: 24.2 KG/M2 | HEIGHT: 64 IN | SYSTOLIC BLOOD PRESSURE: 138 MMHG | HEART RATE: 73 BPM

## 2021-02-25 DIAGNOSIS — M51.9 LUMBAR DISC DISEASE: ICD-10-CM

## 2021-02-25 DIAGNOSIS — Z79.899 HIGH RISK MEDICATION USE: ICD-10-CM

## 2021-02-25 DIAGNOSIS — M85.89 OSTEOPENIA OF MULTIPLE SITES: ICD-10-CM

## 2021-02-25 DIAGNOSIS — E55.9 HYPOVITAMINOSIS D: ICD-10-CM

## 2021-02-25 DIAGNOSIS — M32.19 OTHER SYSTEMIC LUPUS ERYTHEMATOSUS WITH OTHER ORGAN INVOLVEMENT: Primary | ICD-10-CM

## 2021-02-25 DIAGNOSIS — I73.00 RAYNAUD'S PHENOMENON WITHOUT GANGRENE: ICD-10-CM

## 2021-02-25 DIAGNOSIS — M25.511 CHRONIC PAIN OF BOTH SHOULDERS: ICD-10-CM

## 2021-02-25 DIAGNOSIS — G89.29 CHRONIC PAIN OF BOTH SHOULDERS: ICD-10-CM

## 2021-02-25 DIAGNOSIS — M25.512 CHRONIC PAIN OF BOTH SHOULDERS: ICD-10-CM

## 2021-02-25 PROCEDURE — 99999 PR PBB SHADOW E&M-EST. PATIENT-LVL III: CPT | Mod: PBBFAC,,, | Performed by: PHYSICIAN ASSISTANT

## 2021-02-25 PROCEDURE — 99214 PR OFFICE/OUTPT VISIT, EST, LEVL IV, 30-39 MIN: ICD-10-PCS | Mod: S$GLB,,, | Performed by: PHYSICIAN ASSISTANT

## 2021-02-25 PROCEDURE — 99214 OFFICE O/P EST MOD 30 MIN: CPT | Mod: S$GLB,,, | Performed by: PHYSICIAN ASSISTANT

## 2021-02-25 PROCEDURE — 99499 RISK ADDL DX/OHS AUDIT: ICD-10-PCS | Mod: S$GLB,,, | Performed by: PHYSICIAN ASSISTANT

## 2021-02-25 PROCEDURE — 99213 OFFICE O/P EST LOW 20 MIN: CPT | Mod: PBBFAC | Performed by: PHYSICIAN ASSISTANT

## 2021-02-25 PROCEDURE — 99999 PR PBB SHADOW E&M-EST. PATIENT-LVL III: ICD-10-PCS | Mod: PBBFAC,,, | Performed by: PHYSICIAN ASSISTANT

## 2021-02-25 PROCEDURE — 99499 UNLISTED E&M SERVICE: CPT | Mod: S$GLB,,, | Performed by: PHYSICIAN ASSISTANT

## 2021-02-25 RX ORDER — TRIAMCINOLONE ACETONIDE 1 MG/G
CREAM TOPICAL 2 TIMES DAILY
Qty: 15 G | Refills: 2 | Status: SHIPPED | OUTPATIENT
Start: 2021-02-25 | End: 2022-06-01 | Stop reason: SDUPTHER

## 2021-02-25 RX ORDER — HYDROXYCHLOROQUINE SULFATE 200 MG/1
TABLET, FILM COATED ORAL
Qty: 36 TABLET | Refills: 3 | Status: SHIPPED | OUTPATIENT
Start: 2021-02-25 | End: 2021-08-26 | Stop reason: SDUPTHER

## 2021-04-06 ENCOUNTER — LAB VISIT (OUTPATIENT)
Dept: LAB | Facility: HOSPITAL | Age: 71
End: 2021-04-06
Attending: NURSE PRACTITIONER
Payer: MEDICARE

## 2021-04-06 ENCOUNTER — OFFICE VISIT (OUTPATIENT)
Dept: FAMILY MEDICINE | Facility: CLINIC | Age: 71
End: 2021-04-06
Payer: MEDICARE

## 2021-04-06 ENCOUNTER — HOSPITAL ENCOUNTER (OUTPATIENT)
Dept: RADIOLOGY | Facility: HOSPITAL | Age: 71
Discharge: HOME | End: 2021-04-06
Attending: NURSE PRACTITIONER
Payer: MEDICARE

## 2021-04-06 VITALS
TEMPERATURE: 99 F | HEART RATE: 78 BPM | WEIGHT: 134.38 LBS | HEIGHT: 64 IN | SYSTOLIC BLOOD PRESSURE: 123 MMHG | BODY MASS INDEX: 22.94 KG/M2 | DIASTOLIC BLOOD PRESSURE: 74 MMHG

## 2021-04-06 DIAGNOSIS — R63.0 DECREASED APPETITE: ICD-10-CM

## 2021-04-06 DIAGNOSIS — R10.11 RUQ PAIN: ICD-10-CM

## 2021-04-06 DIAGNOSIS — R53.83 FATIGUE, UNSPECIFIED TYPE: ICD-10-CM

## 2021-04-06 DIAGNOSIS — R10.11 RIGHT UPPER QUADRANT ABDOMINAL PAIN: ICD-10-CM

## 2021-04-06 DIAGNOSIS — R42 DIZZINESS: ICD-10-CM

## 2021-04-06 DIAGNOSIS — R10.11 RUQ PAIN: Primary | ICD-10-CM

## 2021-04-06 LAB
BASOPHILS # BLD AUTO: 0.02 K/UL (ref 0–0.2)
BASOPHILS NFR BLD: 0.3 % (ref 0–1.9)
DIFFERENTIAL METHOD: ABNORMAL
EOSINOPHIL # BLD AUTO: 0 K/UL (ref 0–0.5)
EOSINOPHIL NFR BLD: 0.1 % (ref 0–8)
ERYTHROCYTE [DISTWIDTH] IN BLOOD BY AUTOMATED COUNT: 11.9 % (ref 11.5–14.5)
HCT VFR BLD AUTO: 44.3 % (ref 37–48.5)
HGB BLD-MCNC: 14.7 G/DL (ref 12–16)
IMM GRANULOCYTES # BLD AUTO: 0.04 K/UL (ref 0–0.04)
IMM GRANULOCYTES NFR BLD AUTO: 0.5 % (ref 0–0.5)
LYMPHOCYTES # BLD AUTO: 1.6 K/UL (ref 1–4.8)
LYMPHOCYTES NFR BLD: 21.3 % (ref 18–48)
MCH RBC QN AUTO: 31.1 PG (ref 27–31)
MCHC RBC AUTO-ENTMCNC: 33.2 G/DL (ref 32–36)
MCV RBC AUTO: 94 FL (ref 82–98)
MONOCYTES # BLD AUTO: 0.5 K/UL (ref 0.3–1)
MONOCYTES NFR BLD: 6.8 % (ref 4–15)
NEUTROPHILS # BLD AUTO: 5.2 K/UL (ref 1.8–7.7)
NEUTROPHILS NFR BLD: 71 % (ref 38–73)
NRBC BLD-RTO: 0 /100 WBC
PLATELET # BLD AUTO: 292 K/UL (ref 150–450)
PMV BLD AUTO: 9.6 FL (ref 9.2–12.9)
RBC # BLD AUTO: 4.72 M/UL (ref 4–5.4)
WBC # BLD AUTO: 7.38 K/UL (ref 3.9–12.7)

## 2021-04-06 PROCEDURE — 99999 PR PBB SHADOW E&M-EST. PATIENT-LVL IV: ICD-10-PCS | Mod: PBBFAC,,, | Performed by: NURSE PRACTITIONER

## 2021-04-06 PROCEDURE — 85025 COMPLETE CBC W/AUTO DIFF WBC: CPT | Performed by: NURSE PRACTITIONER

## 2021-04-06 PROCEDURE — A9698 NON-RAD CONTRAST MATERIALNOC: HCPCS | Mod: PO | Performed by: NURSE PRACTITIONER

## 2021-04-06 PROCEDURE — 83690 ASSAY OF LIPASE: CPT | Performed by: NURSE PRACTITIONER

## 2021-04-06 PROCEDURE — 74176 CT ABD & PELVIS W/O CONTRAST: CPT | Mod: TC,PO

## 2021-04-06 PROCEDURE — 99999 PR PBB SHADOW E&M-EST. PATIENT-LVL IV: CPT | Mod: PBBFAC,,, | Performed by: NURSE PRACTITIONER

## 2021-04-06 PROCEDURE — 74176 CT ABD & PELVIS W/O CONTRAST: CPT | Mod: 26,,, | Performed by: RADIOLOGY

## 2021-04-06 PROCEDURE — 99214 PR OFFICE/OUTPT VISIT, EST, LEVL IV, 30-39 MIN: ICD-10-PCS | Mod: S$GLB,,, | Performed by: NURSE PRACTITIONER

## 2021-04-06 PROCEDURE — 82150 ASSAY OF AMYLASE: CPT | Performed by: NURSE PRACTITIONER

## 2021-04-06 PROCEDURE — 84443 ASSAY THYROID STIM HORMONE: CPT | Performed by: NURSE PRACTITIONER

## 2021-04-06 PROCEDURE — 80053 COMPREHEN METABOLIC PANEL: CPT | Performed by: NURSE PRACTITIONER

## 2021-04-06 PROCEDURE — 74176 CT ABDOMEN PELVIS WITHOUT CONTRAST: ICD-10-PCS | Mod: 26,,, | Performed by: RADIOLOGY

## 2021-04-06 PROCEDURE — 99214 OFFICE O/P EST MOD 30 MIN: CPT | Mod: S$GLB,,, | Performed by: NURSE PRACTITIONER

## 2021-04-06 PROCEDURE — 99214 OFFICE O/P EST MOD 30 MIN: CPT | Mod: PBBFAC,25,PO | Performed by: NURSE PRACTITIONER

## 2021-04-06 PROCEDURE — 25500020 PHARM REV CODE 255: Mod: PO | Performed by: NURSE PRACTITIONER

## 2021-04-06 PROCEDURE — 36415 COLL VENOUS BLD VENIPUNCTURE: CPT | Mod: PO | Performed by: NURSE PRACTITIONER

## 2021-04-06 RX ORDER — MECLIZINE HCL 12.5 MG 12.5 MG/1
12.5 TABLET ORAL 2 TIMES DAILY PRN
Qty: 14 TABLET | Refills: 0 | Status: SHIPPED | OUTPATIENT
Start: 2021-04-06

## 2021-04-06 RX ORDER — DICYCLOMINE HYDROCHLORIDE 20 MG/1
20 TABLET ORAL 2 TIMES DAILY PRN
Qty: 14 TABLET | Refills: 0 | Status: SHIPPED | OUTPATIENT
Start: 2021-04-06 | End: 2021-04-13

## 2021-04-06 RX ADMIN — IOHEXOL 1000 ML: 9 SOLUTION ORAL at 02:04

## 2021-04-07 ENCOUNTER — TELEPHONE (OUTPATIENT)
Dept: FAMILY MEDICINE | Facility: CLINIC | Age: 71
End: 2021-04-07

## 2021-04-07 DIAGNOSIS — R10.9 ABDOMINAL PAIN, UNSPECIFIED ABDOMINAL LOCATION: Primary | ICD-10-CM

## 2021-04-07 RX ORDER — AMOXICILLIN AND CLAVULANATE POTASSIUM 875; 125 MG/1; MG/1
1 TABLET, FILM COATED ORAL EVERY 12 HOURS
Qty: 20 TABLET | Refills: 0 | Status: SHIPPED | OUTPATIENT
Start: 2021-04-07 | End: 2021-04-17

## 2021-04-08 LAB
ALBUMIN SERPL BCP-MCNC: 3.3 G/DL (ref 3.5–5.2)
ALP SERPL-CCNC: 47 U/L (ref 55–135)
ALT SERPL W/O P-5'-P-CCNC: 10 U/L (ref 10–44)
AMYLASE SERPL-CCNC: 58 U/L (ref 20–110)
ANION GAP SERPL CALC-SCNC: 15 MMOL/L (ref 8–16)
AST SERPL-CCNC: 34 U/L (ref 10–40)
BILIRUB SERPL-MCNC: 0.7 MG/DL (ref 0.1–1)
BUN SERPL-MCNC: 11 MG/DL (ref 8–23)
CALCIUM SERPL-MCNC: 9.3 MG/DL (ref 8.7–10.5)
CHLORIDE SERPL-SCNC: 98 MMOL/L (ref 95–110)
CO2 SERPL-SCNC: 24 MMOL/L (ref 23–29)
CREAT SERPL-MCNC: 0.9 MG/DL (ref 0.5–1.4)
EST. GFR  (AFRICAN AMERICAN): >60 ML/MIN/1.73 M^2
EST. GFR  (NON AFRICAN AMERICAN): >60 ML/MIN/1.73 M^2
GLUCOSE SERPL-MCNC: 89 MG/DL (ref 70–110)
LIPASE SERPL-CCNC: 13 U/L (ref 4–60)
POTASSIUM SERPL-SCNC: 3.8 MMOL/L (ref 3.5–5.1)
PROT SERPL-MCNC: 8.6 G/DL (ref 6–8.4)
SODIUM SERPL-SCNC: 137 MMOL/L (ref 136–145)
TSH SERPL DL<=0.005 MIU/L-ACNC: 1.35 UIU/ML (ref 0.4–4)

## 2021-04-12 ENCOUNTER — PATIENT OUTREACH (OUTPATIENT)
Dept: ADMINISTRATIVE | Facility: OTHER | Age: 71
End: 2021-04-12

## 2021-04-12 DIAGNOSIS — Z12.31 ENCOUNTER FOR SCREENING MAMMOGRAM FOR MALIGNANT NEOPLASM OF BREAST: Primary | ICD-10-CM

## 2021-04-13 ENCOUNTER — TELEPHONE (OUTPATIENT)
Dept: RHEUMATOLOGY | Facility: CLINIC | Age: 71
End: 2021-04-13

## 2021-04-14 ENCOUNTER — LAB VISIT (OUTPATIENT)
Dept: LAB | Facility: HOSPITAL | Age: 71
End: 2021-04-14
Attending: PHYSICIAN ASSISTANT
Payer: MEDICARE

## 2021-04-14 ENCOUNTER — LAB VISIT (OUTPATIENT)
Dept: LAB | Facility: HOSPITAL | Age: 71
End: 2021-04-14
Payer: MEDICARE

## 2021-04-14 ENCOUNTER — OFFICE VISIT (OUTPATIENT)
Dept: RHEUMATOLOGY | Facility: CLINIC | Age: 71
End: 2021-04-14
Payer: MEDICARE

## 2021-04-14 VITALS
SYSTOLIC BLOOD PRESSURE: 144 MMHG | DIASTOLIC BLOOD PRESSURE: 78 MMHG | BODY MASS INDEX: 23.26 KG/M2 | HEIGHT: 64 IN | HEART RATE: 80 BPM | WEIGHT: 136.25 LBS

## 2021-04-14 DIAGNOSIS — E55.9 VITAMIN D DEFICIENCY: ICD-10-CM

## 2021-04-14 DIAGNOSIS — M51.9 LUMBAR DISC DISEASE: ICD-10-CM

## 2021-04-14 DIAGNOSIS — M32.19 OTHER SYSTEMIC LUPUS ERYTHEMATOSUS WITH OTHER ORGAN INVOLVEMENT: Primary | ICD-10-CM

## 2021-04-14 DIAGNOSIS — M25.511 CHRONIC PAIN OF BOTH SHOULDERS: ICD-10-CM

## 2021-04-14 DIAGNOSIS — Z79.899 HIGH RISK MEDICATION USE: ICD-10-CM

## 2021-04-14 DIAGNOSIS — M85.89 OSTEOPENIA OF MULTIPLE SITES: ICD-10-CM

## 2021-04-14 DIAGNOSIS — G89.29 CHRONIC PAIN OF BOTH SHOULDERS: ICD-10-CM

## 2021-04-14 DIAGNOSIS — M32.19 OTHER SYSTEMIC LUPUS ERYTHEMATOSUS WITH OTHER ORGAN INVOLVEMENT: ICD-10-CM

## 2021-04-14 DIAGNOSIS — I73.00 RAYNAUD'S PHENOMENON WITHOUT GANGRENE: ICD-10-CM

## 2021-04-14 DIAGNOSIS — M25.512 CHRONIC PAIN OF BOTH SHOULDERS: ICD-10-CM

## 2021-04-14 LAB
BILIRUB UR QL STRIP: NEGATIVE
C3 SERPL-MCNC: 147 MG/DL (ref 50–180)
C4 SERPL-MCNC: 34 MG/DL (ref 11–44)
CLARITY UR: CLEAR
COLOR UR: YELLOW
CREAT UR-MCNC: 102 MG/DL (ref 15–325)
CRP SERPL-MCNC: 16.3 MG/L (ref 0–8.2)
ERYTHROCYTE [SEDIMENTATION RATE] IN BLOOD BY WESTERGREN METHOD: 16 MM/HR (ref 0–20)
GLUCOSE UR QL STRIP: NEGATIVE
HGB UR QL STRIP: ABNORMAL
KETONES UR QL STRIP: NEGATIVE
LEUKOCYTE ESTERASE UR QL STRIP: NEGATIVE
NITRITE UR QL STRIP: NEGATIVE
PH UR STRIP: 7 [PH] (ref 5–8)
PROT UR QL STRIP: NEGATIVE
PROT UR-MCNC: 10 MG/DL (ref 0–15)
PROT/CREAT UR: 0.1 MG/G{CREAT} (ref 0–0.2)
SP GR UR STRIP: 1.01 (ref 1–1.03)
URN SPEC COLLECT METH UR: ABNORMAL

## 2021-04-14 PROCEDURE — 82570 ASSAY OF URINE CREATININE: CPT | Performed by: PHYSICIAN ASSISTANT

## 2021-04-14 PROCEDURE — 86140 C-REACTIVE PROTEIN: CPT | Performed by: PHYSICIAN ASSISTANT

## 2021-04-14 PROCEDURE — 86160 COMPLEMENT ANTIGEN: CPT | Performed by: PHYSICIAN ASSISTANT

## 2021-04-14 PROCEDURE — 99214 OFFICE O/P EST MOD 30 MIN: CPT | Mod: S$GLB,,, | Performed by: PHYSICIAN ASSISTANT

## 2021-04-14 PROCEDURE — 81003 URINALYSIS AUTO W/O SCOPE: CPT | Performed by: PHYSICIAN ASSISTANT

## 2021-04-14 PROCEDURE — 86038 ANTINUCLEAR ANTIBODIES: CPT | Performed by: PHYSICIAN ASSISTANT

## 2021-04-14 PROCEDURE — 99999 PR PBB SHADOW E&M-EST. PATIENT-LVL IV: ICD-10-PCS | Mod: PBBFAC,,, | Performed by: PHYSICIAN ASSISTANT

## 2021-04-14 PROCEDURE — 99214 OFFICE O/P EST MOD 30 MIN: CPT | Mod: PBBFAC | Performed by: PHYSICIAN ASSISTANT

## 2021-04-14 PROCEDURE — 99214 PR OFFICE/OUTPT VISIT, EST, LEVL IV, 30-39 MIN: ICD-10-PCS | Mod: S$GLB,,, | Performed by: PHYSICIAN ASSISTANT

## 2021-04-14 PROCEDURE — 99999 PR PBB SHADOW E&M-EST. PATIENT-LVL IV: CPT | Mod: PBBFAC,,, | Performed by: PHYSICIAN ASSISTANT

## 2021-04-14 PROCEDURE — 36415 COLL VENOUS BLD VENIPUNCTURE: CPT | Performed by: PHYSICIAN ASSISTANT

## 2021-04-14 PROCEDURE — 96372 THER/PROPH/DIAG INJ SC/IM: CPT | Mod: PBBFAC

## 2021-04-14 PROCEDURE — 86160 COMPLEMENT ANTIGEN: CPT | Mod: 59 | Performed by: PHYSICIAN ASSISTANT

## 2021-04-14 PROCEDURE — 85651 RBC SED RATE NONAUTOMATED: CPT | Performed by: PHYSICIAN ASSISTANT

## 2021-04-14 RX ORDER — BETAMETHASONE SODIUM PHOSPHATE AND BETAMETHASONE ACETATE 3; 3 MG/ML; MG/ML
6 INJECTION, SUSPENSION INTRA-ARTICULAR; INTRALESIONAL; INTRAMUSCULAR; SOFT TISSUE
Status: COMPLETED | OUTPATIENT
Start: 2021-04-14 | End: 2021-04-14

## 2021-04-14 RX ORDER — METHYLPREDNISOLONE 4 MG/1
TABLET ORAL
Qty: 21 TABLET | Refills: 0 | Status: SHIPPED | OUTPATIENT
Start: 2021-04-14 | End: 2021-05-05

## 2021-04-14 RX ADMIN — BETAMETHASONE ACETATE AND BETAMETHASONE SODIUM PHOSPHATE 6 MG: 3; 3 INJECTION, SUSPENSION INTRA-ARTICULAR; INTRALESIONAL; INTRAMUSCULAR; SOFT TISSUE at 10:04

## 2021-04-15 LAB — ANA SER QL IF: NORMAL

## 2021-08-18 ENCOUNTER — LAB VISIT (OUTPATIENT)
Dept: LAB | Facility: HOSPITAL | Age: 71
End: 2021-08-18
Attending: PHYSICIAN ASSISTANT
Payer: MEDICARE

## 2021-08-18 DIAGNOSIS — M32.9 SYSTEMIC LUPUS ERYTHEMATOSUS, UNSPECIFIED SLE TYPE, UNSPECIFIED ORGAN INVOLVEMENT STATUS: ICD-10-CM

## 2021-08-18 DIAGNOSIS — Z51.81 MEDICATION MONITORING ENCOUNTER: ICD-10-CM

## 2021-08-18 DIAGNOSIS — M32.19 OTHER SYSTEMIC LUPUS ERYTHEMATOSUS WITH OTHER ORGAN INVOLVEMENT: ICD-10-CM

## 2021-08-18 LAB
ALBUMIN SERPL BCP-MCNC: 4.1 G/DL (ref 3.5–5.2)
ALP SERPL-CCNC: 59 U/L (ref 55–135)
ALT SERPL W/O P-5'-P-CCNC: 7 U/L (ref 10–44)
ANION GAP SERPL CALC-SCNC: 10 MMOL/L (ref 8–16)
AST SERPL-CCNC: 17 U/L (ref 10–40)
BACTERIA #/AREA URNS HPF: NORMAL /HPF
BASOPHILS # BLD AUTO: 0.04 K/UL (ref 0–0.2)
BASOPHILS NFR BLD: 0.6 % (ref 0–1.9)
BILIRUB SERPL-MCNC: 1.7 MG/DL (ref 0.1–1)
BILIRUB UR QL STRIP: NEGATIVE
BUN SERPL-MCNC: 8 MG/DL (ref 8–23)
C3 SERPL-MCNC: 137 MG/DL (ref 50–180)
C4 SERPL-MCNC: 32 MG/DL (ref 11–44)
CALCIUM SERPL-MCNC: 9.5 MG/DL (ref 8.7–10.5)
CHLORIDE SERPL-SCNC: 108 MMOL/L (ref 95–110)
CLARITY UR: CLEAR
CO2 SERPL-SCNC: 23 MMOL/L (ref 23–29)
COLOR UR: YELLOW
CREAT SERPL-MCNC: 0.8 MG/DL (ref 0.5–1.4)
CRP SERPL-MCNC: 1.8 MG/L (ref 0–8.2)
DIFFERENTIAL METHOD: ABNORMAL
EOSINOPHIL # BLD AUTO: 0.1 K/UL (ref 0–0.5)
EOSINOPHIL NFR BLD: 1.7 % (ref 0–8)
ERYTHROCYTE [DISTWIDTH] IN BLOOD BY AUTOMATED COUNT: 11.9 % (ref 11.5–14.5)
ERYTHROCYTE [SEDIMENTATION RATE] IN BLOOD BY WESTERGREN METHOD: 10 MM/HR (ref 0–20)
EST. GFR  (AFRICAN AMERICAN): >60 ML/MIN/1.73 M^2
EST. GFR  (NON AFRICAN AMERICAN): >60 ML/MIN/1.73 M^2
GLUCOSE SERPL-MCNC: 86 MG/DL (ref 70–110)
GLUCOSE UR QL STRIP: NEGATIVE
HCT VFR BLD AUTO: 43.3 % (ref 37–48.5)
HGB BLD-MCNC: 14.3 G/DL (ref 12–16)
HGB UR QL STRIP: ABNORMAL
IMM GRANULOCYTES # BLD AUTO: 0.01 K/UL (ref 0–0.04)
IMM GRANULOCYTES NFR BLD AUTO: 0.2 % (ref 0–0.5)
KETONES UR QL STRIP: ABNORMAL
LEUKOCYTE ESTERASE UR QL STRIP: NEGATIVE
LYMPHOCYTES # BLD AUTO: 2.5 K/UL (ref 1–4.8)
LYMPHOCYTES NFR BLD: 39.2 % (ref 18–48)
MCH RBC QN AUTO: 31.4 PG (ref 27–31)
MCHC RBC AUTO-ENTMCNC: 33 G/DL (ref 32–36)
MCV RBC AUTO: 95 FL (ref 82–98)
MICROSCOPIC COMMENT: NORMAL
MONOCYTES # BLD AUTO: 0.6 K/UL (ref 0.3–1)
MONOCYTES NFR BLD: 9.2 % (ref 4–15)
NEUTROPHILS # BLD AUTO: 3.1 K/UL (ref 1.8–7.7)
NEUTROPHILS NFR BLD: 49.3 % (ref 38–73)
NITRITE UR QL STRIP: NEGATIVE
NRBC BLD-RTO: 0 /100 WBC
PH UR STRIP: 6 [PH] (ref 5–8)
PLATELET # BLD AUTO: 246 K/UL (ref 150–450)
PMV BLD AUTO: 9 FL (ref 9.2–12.9)
POTASSIUM SERPL-SCNC: 3.8 MMOL/L (ref 3.5–5.1)
PROT SERPL-MCNC: 8.3 G/DL (ref 6–8.4)
PROT UR QL STRIP: NEGATIVE
RBC # BLD AUTO: 4.55 M/UL (ref 4–5.4)
RBC #/AREA URNS HPF: 2 /HPF (ref 0–4)
SODIUM SERPL-SCNC: 141 MMOL/L (ref 136–145)
SP GR UR STRIP: 1.02 (ref 1–1.03)
SQUAMOUS #/AREA URNS HPF: 6 /HPF
URN SPEC COLLECT METH UR: ABNORMAL
WBC # BLD AUTO: 6.38 K/UL (ref 3.9–12.7)
WBC #/AREA URNS HPF: 1 /HPF (ref 0–5)

## 2021-08-18 PROCEDURE — 85025 COMPLETE CBC W/AUTO DIFF WBC: CPT | Mod: PO | Performed by: PHYSICIAN ASSISTANT

## 2021-08-18 PROCEDURE — 86160 COMPLEMENT ANTIGEN: CPT | Performed by: PHYSICIAN ASSISTANT

## 2021-08-18 PROCEDURE — 86146 BETA-2 GLYCOPROTEIN ANTIBODY: CPT | Performed by: STUDENT IN AN ORGANIZED HEALTH CARE EDUCATION/TRAINING PROGRAM

## 2021-08-18 PROCEDURE — 86140 C-REACTIVE PROTEIN: CPT | Performed by: PHYSICIAN ASSISTANT

## 2021-08-18 PROCEDURE — 81000 URINALYSIS NONAUTO W/SCOPE: CPT | Mod: PO | Performed by: STUDENT IN AN ORGANIZED HEALTH CARE EDUCATION/TRAINING PROGRAM

## 2021-08-18 PROCEDURE — 86225 DNA ANTIBODY NATIVE: CPT | Performed by: STUDENT IN AN ORGANIZED HEALTH CARE EDUCATION/TRAINING PROGRAM

## 2021-08-18 PROCEDURE — 36415 COLL VENOUS BLD VENIPUNCTURE: CPT | Mod: PO | Performed by: STUDENT IN AN ORGANIZED HEALTH CARE EDUCATION/TRAINING PROGRAM

## 2021-08-18 PROCEDURE — 86160 COMPLEMENT ANTIGEN: CPT | Mod: 59 | Performed by: PHYSICIAN ASSISTANT

## 2021-08-18 PROCEDURE — 85651 RBC SED RATE NONAUTOMATED: CPT | Mod: PO | Performed by: PHYSICIAN ASSISTANT

## 2021-08-18 PROCEDURE — 80053 COMPREHEN METABOLIC PANEL: CPT | Performed by: PHYSICIAN ASSISTANT

## 2021-08-19 LAB — DSDNA AB SER-ACNC: NORMAL [IU]/ML

## 2021-08-21 LAB
B2 GLYCOPROT1 IGA SER QL: <9 SAU
B2 GLYCOPROT1 IGG SER QL: <9 SGU
B2 GLYCOPROT1 IGM SER QL: <9 SMU

## 2021-08-25 ENCOUNTER — TELEPHONE (OUTPATIENT)
Dept: RHEUMATOLOGY | Facility: CLINIC | Age: 71
End: 2021-08-25

## 2021-08-26 ENCOUNTER — OFFICE VISIT (OUTPATIENT)
Dept: RHEUMATOLOGY | Facility: CLINIC | Age: 71
End: 2021-08-26
Payer: MEDICARE

## 2021-08-26 VITALS
HEART RATE: 72 BPM | HEIGHT: 64 IN | BODY MASS INDEX: 24.39 KG/M2 | SYSTOLIC BLOOD PRESSURE: 171 MMHG | WEIGHT: 142.88 LBS | DIASTOLIC BLOOD PRESSURE: 83 MMHG

## 2021-08-26 DIAGNOSIS — M25.511 CHRONIC PAIN OF BOTH SHOULDERS: ICD-10-CM

## 2021-08-26 DIAGNOSIS — M85.89 OSTEOPENIA OF MULTIPLE SITES: ICD-10-CM

## 2021-08-26 DIAGNOSIS — M87.022 AVASCULAR NECROSIS OF HUMERAL HEAD, LEFT: ICD-10-CM

## 2021-08-26 DIAGNOSIS — I73.00 RAYNAUD'S PHENOMENON WITHOUT GANGRENE: ICD-10-CM

## 2021-08-26 DIAGNOSIS — G89.29 CHRONIC PAIN OF BOTH SHOULDERS: ICD-10-CM

## 2021-08-26 DIAGNOSIS — E55.9 HYPOVITAMINOSIS D: ICD-10-CM

## 2021-08-26 DIAGNOSIS — M51.9 LUMBAR DISC DISEASE: ICD-10-CM

## 2021-08-26 DIAGNOSIS — M32.19 OTHER SYSTEMIC LUPUS ERYTHEMATOSUS WITH OTHER ORGAN INVOLVEMENT: Primary | ICD-10-CM

## 2021-08-26 DIAGNOSIS — M25.512 CHRONIC PAIN OF BOTH SHOULDERS: ICD-10-CM

## 2021-08-26 DIAGNOSIS — Z79.899 HIGH RISK MEDICATION USE: ICD-10-CM

## 2021-08-26 DIAGNOSIS — E55.9 VITAMIN D DEFICIENCY: ICD-10-CM

## 2021-08-26 PROCEDURE — 99999 PR PBB SHADOW E&M-EST. PATIENT-LVL III: ICD-10-PCS | Mod: PBBFAC,,, | Performed by: PHYSICIAN ASSISTANT

## 2021-08-26 PROCEDURE — 99214 OFFICE O/P EST MOD 30 MIN: CPT | Mod: S$GLB,,, | Performed by: PHYSICIAN ASSISTANT

## 2021-08-26 PROCEDURE — 99213 OFFICE O/P EST LOW 20 MIN: CPT | Mod: PBBFAC | Performed by: PHYSICIAN ASSISTANT

## 2021-08-26 PROCEDURE — 99214 PR OFFICE/OUTPT VISIT, EST, LEVL IV, 30-39 MIN: ICD-10-PCS | Mod: S$GLB,,, | Performed by: PHYSICIAN ASSISTANT

## 2021-08-26 PROCEDURE — 99499 UNLISTED E&M SERVICE: CPT | Mod: S$GLB,,, | Performed by: PHYSICIAN ASSISTANT

## 2021-08-26 PROCEDURE — 99999 PR PBB SHADOW E&M-EST. PATIENT-LVL III: CPT | Mod: PBBFAC,,, | Performed by: PHYSICIAN ASSISTANT

## 2021-08-26 PROCEDURE — 99499 RISK ADDL DX/OHS AUDIT: ICD-10-PCS | Mod: S$GLB,,, | Performed by: PHYSICIAN ASSISTANT

## 2021-08-26 RX ORDER — ERGOCALCIFEROL 1.25 MG/1
50000 CAPSULE ORAL
Qty: 12 CAPSULE | Refills: 3 | Status: SHIPPED | OUTPATIENT
Start: 2021-08-26 | End: 2022-11-21 | Stop reason: SDUPTHER

## 2021-08-26 RX ORDER — GABAPENTIN 300 MG/1
300 CAPSULE ORAL 3 TIMES DAILY
Qty: 90 CAPSULE | Refills: 11 | Status: SHIPPED | OUTPATIENT
Start: 2021-08-26 | End: 2023-10-17

## 2021-08-26 RX ORDER — MELOXICAM 15 MG/1
15 TABLET ORAL DAILY
Qty: 90 TABLET | Refills: 5 | Status: SHIPPED | OUTPATIENT
Start: 2021-08-26 | End: 2022-05-06

## 2021-08-26 RX ORDER — HYDROXYCHLOROQUINE SULFATE 200 MG/1
TABLET, FILM COATED ORAL
Qty: 36 TABLET | Refills: 3 | Status: SHIPPED | OUTPATIENT
Start: 2021-08-26 | End: 2023-02-28 | Stop reason: SDUPTHER

## 2021-08-26 RX ORDER — CYCLOBENZAPRINE HCL 10 MG
10 TABLET ORAL NIGHTLY
Qty: 90 TABLET | Refills: 3 | Status: SHIPPED | OUTPATIENT
Start: 2021-08-26 | End: 2023-02-28 | Stop reason: SDUPTHER

## 2021-09-15 ENCOUNTER — TELEPHONE (OUTPATIENT)
Dept: UROLOGY | Facility: CLINIC | Age: 71
End: 2021-09-15

## 2021-09-16 ENCOUNTER — TELEPHONE (OUTPATIENT)
Dept: UROLOGY | Facility: CLINIC | Age: 71
End: 2021-09-16

## 2021-09-21 ENCOUNTER — TELEPHONE (OUTPATIENT)
Dept: UROLOGY | Facility: CLINIC | Age: 71
End: 2021-09-21

## 2021-11-17 ENCOUNTER — TELEPHONE (OUTPATIENT)
Dept: RHEUMATOLOGY | Facility: CLINIC | Age: 71
End: 2021-11-17
Payer: MEDICARE

## 2021-12-20 ENCOUNTER — LAB VISIT (OUTPATIENT)
Dept: LAB | Facility: HOSPITAL | Age: 71
End: 2021-12-20
Attending: RADIOLOGY
Payer: MEDICARE

## 2021-12-20 DIAGNOSIS — M32.19 OTHER SYSTEMIC LUPUS ERYTHEMATOSUS WITH OTHER ORGAN INVOLVEMENT: ICD-10-CM

## 2021-12-20 DIAGNOSIS — M32.9 SYSTEMIC LUPUS ERYTHEMATOSUS, UNSPECIFIED SLE TYPE, UNSPECIFIED ORGAN INVOLVEMENT STATUS: ICD-10-CM

## 2021-12-20 DIAGNOSIS — Z51.81 MEDICATION MONITORING ENCOUNTER: ICD-10-CM

## 2021-12-20 LAB
ALBUMIN SERPL BCP-MCNC: 3.7 G/DL (ref 3.5–5.2)
ALP SERPL-CCNC: 69 U/L (ref 55–135)
ALT SERPL W/O P-5'-P-CCNC: 10 U/L (ref 10–44)
ANION GAP SERPL CALC-SCNC: 7 MMOL/L (ref 8–16)
AST SERPL-CCNC: 20 U/L (ref 10–40)
BACTERIA #/AREA URNS AUTO: ABNORMAL /HPF
BASOPHILS # BLD AUTO: 0.03 K/UL (ref 0–0.2)
BASOPHILS NFR BLD: 0.4 % (ref 0–1.9)
BILIRUB SERPL-MCNC: 0.9 MG/DL (ref 0.1–1)
BILIRUB UR QL STRIP: NEGATIVE
BUN SERPL-MCNC: 8 MG/DL (ref 8–23)
C3 SERPL-MCNC: 143 MG/DL (ref 50–180)
C4 SERPL-MCNC: 32 MG/DL (ref 11–44)
CALCIUM SERPL-MCNC: 9.7 MG/DL (ref 8.7–10.5)
CHLORIDE SERPL-SCNC: 105 MMOL/L (ref 95–110)
CLARITY UR REFRACT.AUTO: CLEAR
CO2 SERPL-SCNC: 29 MMOL/L (ref 23–29)
COLOR UR AUTO: YELLOW
CREAT SERPL-MCNC: 0.8 MG/DL (ref 0.5–1.4)
CREAT UR-MCNC: 78 MG/DL (ref 15–325)
CRP SERPL-MCNC: 3.7 MG/L (ref 0–8.2)
DIFFERENTIAL METHOD: ABNORMAL
EOSINOPHIL # BLD AUTO: 0.1 K/UL (ref 0–0.5)
EOSINOPHIL NFR BLD: 1.3 % (ref 0–8)
ERYTHROCYTE [DISTWIDTH] IN BLOOD BY AUTOMATED COUNT: 11.9 % (ref 11.5–14.5)
ERYTHROCYTE [SEDIMENTATION RATE] IN BLOOD BY WESTERGREN METHOD: 17 MM/HR (ref 0–20)
EST. GFR  (AFRICAN AMERICAN): >60 ML/MIN/1.73 M^2
EST. GFR  (NON AFRICAN AMERICAN): >60 ML/MIN/1.73 M^2
GLUCOSE SERPL-MCNC: 86 MG/DL (ref 70–110)
GLUCOSE UR QL STRIP: NEGATIVE
HCT VFR BLD AUTO: 44.6 % (ref 37–48.5)
HGB BLD-MCNC: 14.5 G/DL (ref 12–16)
HGB UR QL STRIP: ABNORMAL
IMM GRANULOCYTES # BLD AUTO: 0.01 K/UL (ref 0–0.04)
IMM GRANULOCYTES NFR BLD AUTO: 0.1 % (ref 0–0.5)
KETONES UR QL STRIP: NEGATIVE
LEUKOCYTE ESTERASE UR QL STRIP: NEGATIVE
LYMPHOCYTES # BLD AUTO: 3.4 K/UL (ref 1–4.8)
LYMPHOCYTES NFR BLD: 47.5 % (ref 18–48)
MCH RBC QN AUTO: 31.1 PG (ref 27–31)
MCHC RBC AUTO-ENTMCNC: 32.5 G/DL (ref 32–36)
MCV RBC AUTO: 96 FL (ref 82–98)
MICROSCOPIC COMMENT: ABNORMAL
MONOCYTES # BLD AUTO: 0.7 K/UL (ref 0.3–1)
MONOCYTES NFR BLD: 9.7 % (ref 4–15)
NEUTROPHILS # BLD AUTO: 2.9 K/UL (ref 1.8–7.7)
NEUTROPHILS NFR BLD: 41.1 % (ref 38–73)
NITRITE UR QL STRIP: NEGATIVE
NRBC BLD-RTO: 0 /100 WBC
PH UR STRIP: 7 [PH] (ref 5–8)
PLATELET # BLD AUTO: 245 K/UL (ref 150–450)
PMV BLD AUTO: 8.5 FL (ref 9.2–12.9)
POTASSIUM SERPL-SCNC: 4.2 MMOL/L (ref 3.5–5.1)
PROT SERPL-MCNC: 8.3 G/DL (ref 6–8.4)
PROT UR QL STRIP: NEGATIVE
PROT UR-MCNC: <7 MG/DL (ref 0–15)
PROT/CREAT UR: NORMAL MG/G{CREAT} (ref 0–0.2)
RBC # BLD AUTO: 4.66 M/UL (ref 4–5.4)
RBC #/AREA URNS AUTO: 7 /HPF (ref 0–4)
SODIUM SERPL-SCNC: 141 MMOL/L (ref 136–145)
SP GR UR STRIP: 1.01 (ref 1–1.03)
SQUAMOUS #/AREA URNS AUTO: 3 /HPF
URN SPEC COLLECT METH UR: ABNORMAL
WBC # BLD AUTO: 7.08 K/UL (ref 3.9–12.7)
WBC #/AREA URNS AUTO: 1 /HPF (ref 0–5)

## 2021-12-20 PROCEDURE — 81001 URINALYSIS AUTO W/SCOPE: CPT | Performed by: PHYSICIAN ASSISTANT

## 2021-12-20 PROCEDURE — 86140 C-REACTIVE PROTEIN: CPT | Performed by: PHYSICIAN ASSISTANT

## 2021-12-20 PROCEDURE — 86160 COMPLEMENT ANTIGEN: CPT | Mod: 59 | Performed by: PHYSICIAN ASSISTANT

## 2021-12-20 PROCEDURE — 85651 RBC SED RATE NONAUTOMATED: CPT | Mod: PO | Performed by: PHYSICIAN ASSISTANT

## 2021-12-20 PROCEDURE — 86225 DNA ANTIBODY NATIVE: CPT | Performed by: PHYSICIAN ASSISTANT

## 2021-12-20 PROCEDURE — 80053 COMPREHEN METABOLIC PANEL: CPT | Performed by: PHYSICIAN ASSISTANT

## 2021-12-20 PROCEDURE — 82570 ASSAY OF URINE CREATININE: CPT | Performed by: PHYSICIAN ASSISTANT

## 2021-12-20 PROCEDURE — 86160 COMPLEMENT ANTIGEN: CPT | Performed by: PHYSICIAN ASSISTANT

## 2021-12-20 PROCEDURE — 36415 COLL VENOUS BLD VENIPUNCTURE: CPT | Mod: PO | Performed by: PHYSICIAN ASSISTANT

## 2021-12-20 PROCEDURE — 85025 COMPLETE CBC W/AUTO DIFF WBC: CPT | Mod: PO | Performed by: PHYSICIAN ASSISTANT

## 2021-12-21 LAB — DSDNA AB SER-ACNC: NORMAL [IU]/ML

## 2021-12-28 ENCOUNTER — PATIENT OUTREACH (OUTPATIENT)
Dept: ADMINISTRATIVE | Facility: OTHER | Age: 71
End: 2021-12-28
Payer: MEDICARE

## 2021-12-29 ENCOUNTER — OFFICE VISIT (OUTPATIENT)
Dept: RHEUMATOLOGY | Facility: CLINIC | Age: 71
End: 2021-12-29
Payer: MEDICARE

## 2021-12-29 VITALS
SYSTOLIC BLOOD PRESSURE: 154 MMHG | BODY MASS INDEX: 24.84 KG/M2 | HEIGHT: 64 IN | HEART RATE: 75 BPM | WEIGHT: 145.5 LBS | DIASTOLIC BLOOD PRESSURE: 81 MMHG

## 2021-12-29 DIAGNOSIS — M32.8 OTHER FORMS OF SYSTEMIC LUPUS ERYTHEMATOSUS, UNSPECIFIED ORGAN INVOLVEMENT STATUS: ICD-10-CM

## 2021-12-29 DIAGNOSIS — D84.9 IMMUNOCOMPROMISED: ICD-10-CM

## 2021-12-29 DIAGNOSIS — I73.00 RAYNAUD'S PHENOMENON WITHOUT GANGRENE: ICD-10-CM

## 2021-12-29 DIAGNOSIS — Z51.81 MEDICATION MONITORING ENCOUNTER: ICD-10-CM

## 2021-12-29 DIAGNOSIS — R31.29 MICROSCOPIC HEMATURIA: ICD-10-CM

## 2021-12-29 DIAGNOSIS — M85.89 OSTEOPENIA OF MULTIPLE SITES: Primary | ICD-10-CM

## 2021-12-29 DIAGNOSIS — M15.9 PRIMARY OSTEOARTHRITIS INVOLVING MULTIPLE JOINTS: ICD-10-CM

## 2021-12-29 PROCEDURE — 3077F SYST BP >= 140 MM HG: CPT | Mod: CPTII,S$GLB,, | Performed by: PHYSICIAN ASSISTANT

## 2021-12-29 PROCEDURE — 3079F DIAST BP 80-89 MM HG: CPT | Mod: CPTII,S$GLB,, | Performed by: PHYSICIAN ASSISTANT

## 2021-12-29 PROCEDURE — 1125F AMNT PAIN NOTED PAIN PRSNT: CPT | Mod: CPTII,S$GLB,, | Performed by: PHYSICIAN ASSISTANT

## 2021-12-29 PROCEDURE — 99499 UNLISTED E&M SERVICE: CPT | Mod: S$GLB,,, | Performed by: PHYSICIAN ASSISTANT

## 2021-12-29 PROCEDURE — 1125F PR PAIN SEVERITY QUANTIFIED, PAIN PRESENT: ICD-10-PCS | Mod: CPTII,S$GLB,, | Performed by: PHYSICIAN ASSISTANT

## 2021-12-29 PROCEDURE — 3288F FALL RISK ASSESSMENT DOCD: CPT | Mod: CPTII,S$GLB,, | Performed by: PHYSICIAN ASSISTANT

## 2021-12-29 PROCEDURE — 1101F PR PT FALLS ASSESS DOC 0-1 FALLS W/OUT INJ PAST YR: ICD-10-PCS | Mod: CPTII,S$GLB,, | Performed by: PHYSICIAN ASSISTANT

## 2021-12-29 PROCEDURE — 3008F BODY MASS INDEX DOCD: CPT | Mod: CPTII,S$GLB,, | Performed by: PHYSICIAN ASSISTANT

## 2021-12-29 PROCEDURE — 1159F PR MEDICATION LIST DOCUMENTED IN MEDICAL RECORD: ICD-10-PCS | Mod: CPTII,S$GLB,, | Performed by: PHYSICIAN ASSISTANT

## 2021-12-29 PROCEDURE — 99215 OFFICE O/P EST HI 40 MIN: CPT | Mod: 25,S$GLB,, | Performed by: PHYSICIAN ASSISTANT

## 2021-12-29 PROCEDURE — 99215 PR OFFICE/OUTPT VISIT, EST, LEVL V, 40-54 MIN: ICD-10-PCS | Mod: 25,S$GLB,, | Performed by: PHYSICIAN ASSISTANT

## 2021-12-29 PROCEDURE — 3077F PR MOST RECENT SYSTOLIC BLOOD PRESSURE >= 140 MM HG: ICD-10-PCS | Mod: CPTII,S$GLB,, | Performed by: PHYSICIAN ASSISTANT

## 2021-12-29 PROCEDURE — 3008F PR BODY MASS INDEX (BMI) DOCUMENTED: ICD-10-PCS | Mod: CPTII,S$GLB,, | Performed by: PHYSICIAN ASSISTANT

## 2021-12-29 PROCEDURE — 99999 PR PBB SHADOW E&M-EST. PATIENT-LVL III: ICD-10-PCS | Mod: PBBFAC,,, | Performed by: PHYSICIAN ASSISTANT

## 2021-12-29 PROCEDURE — 1159F MED LIST DOCD IN RCRD: CPT | Mod: CPTII,S$GLB,, | Performed by: PHYSICIAN ASSISTANT

## 2021-12-29 PROCEDURE — 99999 PR PBB SHADOW E&M-EST. PATIENT-LVL III: CPT | Mod: PBBFAC,,, | Performed by: PHYSICIAN ASSISTANT

## 2021-12-29 PROCEDURE — 3079F PR MOST RECENT DIASTOLIC BLOOD PRESSURE 80-89 MM HG: ICD-10-PCS | Mod: CPTII,S$GLB,, | Performed by: PHYSICIAN ASSISTANT

## 2021-12-29 PROCEDURE — 96372 PR INJECTION,THERAP/PROPH/DIAG2ST, IM OR SUBCUT: ICD-10-PCS | Mod: S$GLB,,, | Performed by: PHYSICIAN ASSISTANT

## 2021-12-29 PROCEDURE — 3288F PR FALLS RISK ASSESSMENT DOCUMENTED: ICD-10-PCS | Mod: CPTII,S$GLB,, | Performed by: PHYSICIAN ASSISTANT

## 2021-12-29 PROCEDURE — 99499 RISK ADDL DX/OHS AUDIT: ICD-10-PCS | Mod: S$GLB,,, | Performed by: PHYSICIAN ASSISTANT

## 2021-12-29 PROCEDURE — 1101F PT FALLS ASSESS-DOCD LE1/YR: CPT | Mod: CPTII,S$GLB,, | Performed by: PHYSICIAN ASSISTANT

## 2021-12-29 PROCEDURE — 96372 THER/PROPH/DIAG INJ SC/IM: CPT | Mod: S$GLB,,, | Performed by: PHYSICIAN ASSISTANT

## 2021-12-29 RX ORDER — BETAMETHASONE SODIUM PHOSPHATE AND BETAMETHASONE ACETATE 3; 3 MG/ML; MG/ML
6 INJECTION, SUSPENSION INTRA-ARTICULAR; INTRALESIONAL; INTRAMUSCULAR; SOFT TISSUE
Status: COMPLETED | OUTPATIENT
Start: 2021-12-29 | End: 2021-12-29

## 2021-12-29 RX ADMIN — BETAMETHASONE SODIUM PHOSPHATE AND BETAMETHASONE ACETATE 6 MG: 3; 3 INJECTION, SUSPENSION INTRA-ARTICULAR; INTRALESIONAL; INTRAMUSCULAR; SOFT TISSUE at 09:12

## 2022-02-10 ENCOUNTER — PES CALL (OUTPATIENT)
Dept: ADMINISTRATIVE | Facility: CLINIC | Age: 72
End: 2022-02-10
Payer: MEDICARE

## 2022-02-17 ENCOUNTER — OFFICE VISIT (OUTPATIENT)
Dept: OPHTHALMOLOGY | Facility: CLINIC | Age: 72
End: 2022-02-17
Payer: MEDICARE

## 2022-02-17 DIAGNOSIS — H25.13 NUCLEAR SCLEROSIS, BILATERAL: ICD-10-CM

## 2022-02-17 DIAGNOSIS — H52.203 MYOPIA WITH ASTIGMATISM AND PRESBYOPIA, BILATERAL: ICD-10-CM

## 2022-02-17 DIAGNOSIS — H52.13 MYOPIA WITH ASTIGMATISM AND PRESBYOPIA, BILATERAL: ICD-10-CM

## 2022-02-17 DIAGNOSIS — M32.8 OTHER FORMS OF SYSTEMIC LUPUS ERYTHEMATOSUS, UNSPECIFIED ORGAN INVOLVEMENT STATUS: Primary | ICD-10-CM

## 2022-02-17 DIAGNOSIS — Z79.899 LONG-TERM USE OF PLAQUENIL: ICD-10-CM

## 2022-02-17 DIAGNOSIS — H52.4 MYOPIA WITH ASTIGMATISM AND PRESBYOPIA, BILATERAL: ICD-10-CM

## 2022-02-17 PROCEDURE — 2023F PR DILATED RETINAL EXAM W/O EVID OF RETINOPATHY: ICD-10-PCS | Mod: CPTII,S$GLB,, | Performed by: OPTOMETRIST

## 2022-02-17 PROCEDURE — 92134 POSTERIOR SEGMENT OCT RETINA (OCULAR COHERENCE TOMOGRAPHY)-BOTH EYES: ICD-10-PCS | Mod: S$GLB,,, | Performed by: OPTOMETRIST

## 2022-02-17 PROCEDURE — 1126F PR PAIN SEVERITY QUANTIFIED, NO PAIN PRESENT: ICD-10-PCS | Mod: CPTII,S$GLB,, | Performed by: OPTOMETRIST

## 2022-02-17 PROCEDURE — 1159F MED LIST DOCD IN RCRD: CPT | Mod: CPTII,S$GLB,, | Performed by: OPTOMETRIST

## 2022-02-17 PROCEDURE — 92004 PR EYE EXAM, NEW PATIENT,COMPREHESV: ICD-10-PCS | Mod: S$GLB,,, | Performed by: OPTOMETRIST

## 2022-02-17 PROCEDURE — 92015 PR REFRACTION: ICD-10-PCS | Mod: S$GLB,,, | Performed by: OPTOMETRIST

## 2022-02-17 PROCEDURE — 92015 DETERMINE REFRACTIVE STATE: CPT | Mod: S$GLB,,, | Performed by: OPTOMETRIST

## 2022-02-17 PROCEDURE — 99999 PR PBB SHADOW E&M-EST. PATIENT-LVL III: ICD-10-PCS | Mod: PBBFAC,,, | Performed by: OPTOMETRIST

## 2022-02-17 PROCEDURE — 92134 CPTRZ OPH DX IMG PST SGM RTA: CPT | Mod: S$GLB,,, | Performed by: OPTOMETRIST

## 2022-02-17 PROCEDURE — 1126F AMNT PAIN NOTED NONE PRSNT: CPT | Mod: CPTII,S$GLB,, | Performed by: OPTOMETRIST

## 2022-02-17 PROCEDURE — 1159F PR MEDICATION LIST DOCUMENTED IN MEDICAL RECORD: ICD-10-PCS | Mod: CPTII,S$GLB,, | Performed by: OPTOMETRIST

## 2022-02-17 PROCEDURE — 1160F PR REVIEW ALL MEDS BY PRESCRIBER/CLIN PHARMACIST DOCUMENTED: ICD-10-PCS | Mod: CPTII,S$GLB,, | Performed by: OPTOMETRIST

## 2022-02-17 PROCEDURE — 99999 PR PBB SHADOW E&M-EST. PATIENT-LVL III: CPT | Mod: PBBFAC,,, | Performed by: OPTOMETRIST

## 2022-02-17 PROCEDURE — 1160F RVW MEDS BY RX/DR IN RCRD: CPT | Mod: CPTII,S$GLB,, | Performed by: OPTOMETRIST

## 2022-02-17 PROCEDURE — 92004 COMPRE OPH EXAM NEW PT 1/>: CPT | Mod: S$GLB,,, | Performed by: OPTOMETRIST

## 2022-02-17 PROCEDURE — 2023F DILAT RTA XM W/O RTNOPTHY: CPT | Mod: CPTII,S$GLB,, | Performed by: OPTOMETRIST

## 2022-02-17 NOTE — PROGRESS NOTES
HPI     Pt here for annual eye exam   Np to DNL  Last visit was in 2018 with  Dr. Rust   Long term plaquenil use (Lupus)    Vision changes since last eye exam?: Yes     Any eye pain today: No    Other ocular symptoms: No    Interested in contact lens fitting today? No    Pt states needs to RX for glasses only                 Last edited by Carolee Roach MA on 2/17/2022  1:43 PM. (History)      Plaquenil since 1998, 200 mg 3x week        Assessment /Plan     For exam results, see Encounter Report.    Other forms of systemic lupus erythematosus, unspecified organ involvement status  -     Posterior Segment OCT Retina-Both eyes  Long-term use of Plaquenil  -     Posterior Segment OCT Retina-Both eyes    Nuclear sclerosis, bilateral  Visually significant cataract.  Patient is at the option stage.   Cataract surgery will improve vision, but necessity is dependent on patient's symptoms.   Pt declines surgical consult at this time.  Will continue to monitor over the next 12 months. Pt to call or RTC with any significant change in vision prior to next visit.    Myopia with astigmatism and presbyopia, bilateral  Eyeglass Final Rx     Eyeglass Final Rx       Sphere Cylinder Axis Add    Right -6.75 +0.75 010 +2.75    Left -5.75 +1.25 170 +2.75    Expiration Date: 2/18/2023              No maculopathy present today.   Stressed importance of follow up visits with pt.  RTC 6 months for 10-2 VF, Optos and mOCT.   PRN sooner if any va changes.

## 2022-03-03 NOTE — PROGRESS NOTES
Impression: Presence of pseudophakia: Z96.1. OU. Bilateral. Plan: PCIOL in good position OU. Subjective:       Patient ID: Alice Loza is a 67 y.o. female.    Chief Complaint: SLE, osteopoenia.    Alice is here for f/u. Last seen by Dr. Maguire. We see her for SLE, recently has been in remission but has been significant in the past.  She has AVN both shoulders, has seen Dr. Ruiz, planning replacement at some point for both shoulders.  They are a major source of pain at times, and she takes tramadol prn.  Regarding her lupus, she is on plaquenil 3 times a week. Last several ANAs have been negative as has the dsdna, complements have been normal. She also has a h/o osteopenia and is currently on bisphosphonate holiday (fosamax). Bone density due March 2018. She denies any fevers, hair loss, sob, new rashes (does have some hyperpigmentation on the tops of her feet around the 1 mtp), no GI issues, no joint swelling. She has occasional sharp pains in her chest only when she washes dishes, goes away when she finishes, no pain at other times.  She lost her sister a month ago, had an episode of weakness at that time where she couldn't get out of bed, her legs were very weak.  This lasted one day and has not recurred since then.       Review of Systems   Constitutional: Negative for chills, fatigue and fever.   HENT: Negative for mouth sores, rhinorrhea and sore throat.    Eyes: Negative for pain and redness.   Respiratory: Negative for cough and shortness of breath.    Cardiovascular: Negative for chest pain.   Gastrointestinal: Negative for abdominal pain, constipation, diarrhea, nausea and vomiting.   Genitourinary: Negative for dysuria and hematuria.   Musculoskeletal: Positive for arthralgias and back pain. Negative for joint swelling and myalgias.        Left hip replacement   Skin: Negative for rash.   Neurological: Negative for weakness, numbness and headaches.   Psychiatric/Behavioral: The patient is not nervous/anxious.          Objective:   /70   Pulse 68   Wt 59.5 kg (131 lb 2.8 oz)    BMI 22.52 kg/m²      Physical Exam   Constitutional: She is oriented to person, place, and time and well-developed, well-nourished, and in no distress.   HENT:   Head: Normocephalic and atraumatic.   Eyes: Pupils are equal, round, and reactive to light. Right eye exhibits no discharge.   Neck: Normal range of motion.   Cardiovascular: Normal rate, regular rhythm and normal heart sounds.  Exam reveals no friction rub.    Pulmonary/Chest: Effort normal and breath sounds normal. No respiratory distress.   Abdominal: Soft. She exhibits no distension. There is no tenderness.   Lymphadenopathy:     She has no cervical adenopathy.   Neurological: She is alert and oriented to person, place, and time.   Skin: No rash noted. No erythema.     No rash  skyler feet with some hyperpigmentation to her dorsal surface around mtps   Psychiatric: Mood normal.   Musculoskeletal: Normal range of motion. She exhibits no edema or deformity.   skyler wrists, mcps, pips no synvoitis, no tenderness, no warmth, good rom  Shoulders decreased rom with right shoulder with 90 abduction and left at about 45 degress, tenderness to palpation  skyler knees no effusion, no warmth, no tenderness           Recent Results (from the past 168 hour(s))   Urinalysis    Collection Time: 09/13/17  9:35 AM   Result Value Ref Range    Specimen UA Urine, Clean Catch     Color, UA Yellow Yellow, Straw, Mary    Appearance, UA Clear Clear    pH, UA 7.0 5.0 - 8.0    Specific Gravity, UA 1.020 1.005 - 1.030    Protein, UA Negative Negative    Glucose, UA Negative Negative    Ketones, UA Negative Negative    Bilirubin (UA) Negative Negative    Occult Blood UA Trace (A) Negative    Nitrite, UA Negative Negative    Leukocytes, UA Negative Negative   CBC auto differential    Collection Time: 09/13/17  9:40 AM   Result Value Ref Range    WBC 6.41 3.90 - 12.70 K/uL    RBC 4.44 4.00 - 5.40 M/uL    Hemoglobin 14.3 12.0 - 16.0 g/dL    Hematocrit 43.2 37.0 - 48.5 %    MCV 97 82 - 98 fL     MCH 32.2 (H) 27.0 - 31.0 pg    MCHC 33.1 32.0 - 36.0 g/dL    RDW 12.8 11.5 - 14.5 %    Platelets 210 150 - 350 K/uL    MPV 8.7 (L) 9.2 - 12.9 fL    Gran # 3.3 1.8 - 7.7 K/uL    Lymph # 2.4 1.0 - 4.8 K/uL    Mono # 0.7 0.3 - 1.0 K/uL    Eos # 0.1 0.0 - 0.5 K/uL    Baso # 0.01 0.00 - 0.20 K/uL    Gran% 51.0 38.0 - 73.0 %    Lymph% 37.1 18.0 - 48.0 %    Mono% 10.8 4.0 - 15.0 %    Eosinophil% 0.9 0.0 - 8.0 %    Basophil% 0.2 0.0 - 1.9 %    Differential Method Automated    Comprehensive metabolic panel    Collection Time: 09/13/17  9:40 AM   Result Value Ref Range    Sodium 144 136 - 145 mmol/L    Potassium 4.1 3.5 - 5.1 mmol/L    Chloride 108 95 - 110 mmol/L    CO2 27 23 - 29 mmol/L    Glucose 94 70 - 110 mg/dL    BUN, Bld 12 8 - 23 mg/dL    Creatinine 0.9 0.5 - 1.4 mg/dL    Calcium 9.9 8.7 - 10.5 mg/dL    Total Protein 8.4 6.0 - 8.4 g/dL    Albumin 3.9 3.5 - 5.2 g/dL    Total Bilirubin 1.2 (H) 0.1 - 1.0 mg/dL    Alkaline Phosphatase 67 55 - 135 U/L    AST 18 10 - 40 U/L    ALT 7 (L) 10 - 44 U/L    Anion Gap 9 8 - 16 mmol/L    eGFR if African American >60 >60 mL/min/1.73 m^2    eGFR if non African American >60 >60 mL/min/1.73 m^2      Dexa 3.11.15: RF -1.0, RN -2.2, spine -0.2, stable bmd  Assessment:       1. Other systemic lupus erythematosus with other organ involvement    2. Avascular necrosis of humeral head, unspecified laterality    3. Osteopenia of multiple sites    4. Medication monitoring encounter          Impression:  SLE: plaquenil 3 x week, stable, continues in remission with last few tamela's neg, dsdna and complements normal, no new symptoms  AVN skyler shoulders: planning replacements with dr. ma in future  Medication monitoring; no signs of toxicity, sees eye doctor yearly  Osteopenia: on drug holiday (prev fosamax), dexa due march 2018;   Plan:       No change to plaquenil 3 x week  Cont f/u with ortho for shoulders  Cont yearly eye checks  Repeat dexa 3/2018    Return to clinic in 4 months with tamela,  reg 4, urine, complements

## 2022-03-29 ENCOUNTER — PES CALL (OUTPATIENT)
Dept: ADMINISTRATIVE | Facility: CLINIC | Age: 72
End: 2022-03-29
Payer: MEDICARE

## 2022-04-22 ENCOUNTER — LAB VISIT (OUTPATIENT)
Dept: LAB | Facility: HOSPITAL | Age: 72
End: 2022-04-22
Attending: PHYSICIAN ASSISTANT
Payer: MEDICARE

## 2022-04-22 DIAGNOSIS — I73.00 RAYNAUD'S PHENOMENON WITHOUT GANGRENE: ICD-10-CM

## 2022-04-22 DIAGNOSIS — Z51.81 MEDICATION MONITORING ENCOUNTER: ICD-10-CM

## 2022-04-22 DIAGNOSIS — M85.89 OSTEOPENIA OF MULTIPLE SITES: ICD-10-CM

## 2022-04-22 DIAGNOSIS — D84.9 IMMUNOCOMPROMISED: ICD-10-CM

## 2022-04-22 DIAGNOSIS — M32.8 OTHER FORMS OF SYSTEMIC LUPUS ERYTHEMATOSUS, UNSPECIFIED ORGAN INVOLVEMENT STATUS: ICD-10-CM

## 2022-04-22 LAB
ALBUMIN SERPL BCP-MCNC: 3.5 G/DL (ref 3.5–5.2)
ALP SERPL-CCNC: 57 U/L (ref 55–135)
ALT SERPL W/O P-5'-P-CCNC: 5 U/L (ref 10–44)
ANION GAP SERPL CALC-SCNC: 9 MMOL/L (ref 8–16)
AST SERPL-CCNC: 14 U/L (ref 10–40)
BASOPHILS # BLD AUTO: 0.03 K/UL (ref 0–0.2)
BASOPHILS NFR BLD: 0.4 % (ref 0–1.9)
BILIRUB SERPL-MCNC: 0.9 MG/DL (ref 0.1–1)
BUN SERPL-MCNC: 11 MG/DL (ref 8–23)
C3 SERPL-MCNC: 128 MG/DL (ref 50–180)
C4 SERPL-MCNC: 31 MG/DL (ref 11–44)
CALCIUM SERPL-MCNC: 9 MG/DL (ref 8.7–10.5)
CHLORIDE SERPL-SCNC: 107 MMOL/L (ref 95–110)
CO2 SERPL-SCNC: 26 MMOL/L (ref 23–29)
CREAT SERPL-MCNC: 0.8 MG/DL (ref 0.5–1.4)
CRP SERPL-MCNC: 7 MG/L (ref 0–8.2)
DIFFERENTIAL METHOD: ABNORMAL
EOSINOPHIL # BLD AUTO: 0.2 K/UL (ref 0–0.5)
EOSINOPHIL NFR BLD: 2.1 % (ref 0–8)
ERYTHROCYTE [DISTWIDTH] IN BLOOD BY AUTOMATED COUNT: 12.4 % (ref 11.5–14.5)
ERYTHROCYTE [SEDIMENTATION RATE] IN BLOOD BY WESTERGREN METHOD: 19 MM/HR (ref 0–20)
EST. GFR  (AFRICAN AMERICAN): >60 ML/MIN/1.73 M^2
EST. GFR  (NON AFRICAN AMERICAN): >60 ML/MIN/1.73 M^2
GLUCOSE SERPL-MCNC: 84 MG/DL (ref 70–110)
HCT VFR BLD AUTO: 40.7 % (ref 37–48.5)
HGB BLD-MCNC: 13.5 G/DL (ref 12–16)
IMM GRANULOCYTES # BLD AUTO: 0.02 K/UL (ref 0–0.04)
IMM GRANULOCYTES NFR BLD AUTO: 0.3 % (ref 0–0.5)
LYMPHOCYTES # BLD AUTO: 3 K/UL (ref 1–4.8)
LYMPHOCYTES NFR BLD: 40.9 % (ref 18–48)
MCH RBC QN AUTO: 31.8 PG (ref 27–31)
MCHC RBC AUTO-ENTMCNC: 33.2 G/DL (ref 32–36)
MCV RBC AUTO: 96 FL (ref 82–98)
MONOCYTES # BLD AUTO: 0.9 K/UL (ref 0.3–1)
MONOCYTES NFR BLD: 11.8 % (ref 4–15)
NEUTROPHILS # BLD AUTO: 3.3 K/UL (ref 1.8–7.7)
NEUTROPHILS NFR BLD: 44.8 % (ref 38–73)
NRBC BLD-RTO: 0 /100 WBC
PLATELET # BLD AUTO: 275 K/UL (ref 150–450)
PMV BLD AUTO: 8.8 FL (ref 9.2–12.9)
POTASSIUM SERPL-SCNC: 3.8 MMOL/L (ref 3.5–5.1)
PROT SERPL-MCNC: 7.8 G/DL (ref 6–8.4)
RBC # BLD AUTO: 4.25 M/UL (ref 4–5.4)
SODIUM SERPL-SCNC: 142 MMOL/L (ref 136–145)
WBC # BLD AUTO: 7.31 K/UL (ref 3.9–12.7)

## 2022-04-22 PROCEDURE — 85651 RBC SED RATE NONAUTOMATED: CPT | Mod: PO | Performed by: PHYSICIAN ASSISTANT

## 2022-04-22 PROCEDURE — 86160 COMPLEMENT ANTIGEN: CPT | Performed by: PHYSICIAN ASSISTANT

## 2022-04-22 PROCEDURE — 85025 COMPLETE CBC W/AUTO DIFF WBC: CPT | Mod: PO | Performed by: PHYSICIAN ASSISTANT

## 2022-04-22 PROCEDURE — 86160 COMPLEMENT ANTIGEN: CPT | Mod: 59 | Performed by: PHYSICIAN ASSISTANT

## 2022-04-22 PROCEDURE — 86140 C-REACTIVE PROTEIN: CPT | Performed by: PHYSICIAN ASSISTANT

## 2022-04-22 PROCEDURE — 86225 DNA ANTIBODY NATIVE: CPT | Performed by: PHYSICIAN ASSISTANT

## 2022-04-22 PROCEDURE — 80053 COMPREHEN METABOLIC PANEL: CPT | Performed by: PHYSICIAN ASSISTANT

## 2022-04-22 PROCEDURE — 36415 COLL VENOUS BLD VENIPUNCTURE: CPT | Mod: PO | Performed by: PHYSICIAN ASSISTANT

## 2022-04-25 LAB — DSDNA AB SER-ACNC: NORMAL [IU]/ML

## 2022-04-28 ENCOUNTER — TELEPHONE (OUTPATIENT)
Dept: RHEUMATOLOGY | Facility: CLINIC | Age: 72
End: 2022-04-28
Payer: MEDICARE

## 2022-04-29 ENCOUNTER — HOSPITAL ENCOUNTER (OUTPATIENT)
Dept: RADIOLOGY | Facility: HOSPITAL | Age: 72
Discharge: HOME | End: 2022-04-29
Attending: PHYSICIAN ASSISTANT
Payer: MEDICARE

## 2022-04-29 ENCOUNTER — OFFICE VISIT (OUTPATIENT)
Dept: RHEUMATOLOGY | Facility: CLINIC | Age: 72
End: 2022-04-29
Payer: MEDICARE

## 2022-04-29 ENCOUNTER — TELEPHONE (OUTPATIENT)
Dept: RHEUMATOLOGY | Facility: CLINIC | Age: 72
End: 2022-04-29

## 2022-04-29 ENCOUNTER — TELEPHONE (OUTPATIENT)
Dept: PAIN MEDICINE | Facility: CLINIC | Age: 72
End: 2022-04-29
Payer: MEDICARE

## 2022-04-29 VITALS
BODY MASS INDEX: 24.2 KG/M2 | WEIGHT: 141.75 LBS | SYSTOLIC BLOOD PRESSURE: 143 MMHG | HEART RATE: 67 BPM | DIASTOLIC BLOOD PRESSURE: 77 MMHG | HEIGHT: 64 IN

## 2022-04-29 DIAGNOSIS — Z51.81 MEDICATION MONITORING ENCOUNTER: ICD-10-CM

## 2022-04-29 DIAGNOSIS — R31.29 MICROSCOPIC HEMATURIA: ICD-10-CM

## 2022-04-29 DIAGNOSIS — I73.00 RAYNAUD'S PHENOMENON WITHOUT GANGRENE: ICD-10-CM

## 2022-04-29 DIAGNOSIS — M32.19 OTHER SYSTEMIC LUPUS ERYTHEMATOSUS WITH OTHER ORGAN INVOLVEMENT: Primary | ICD-10-CM

## 2022-04-29 DIAGNOSIS — M54.12 RADICULAR SYNDROME OF UPPER LIMBS: ICD-10-CM

## 2022-04-29 DIAGNOSIS — M15.9 PRIMARY OSTEOARTHRITIS INVOLVING MULTIPLE JOINTS: ICD-10-CM

## 2022-04-29 PROCEDURE — 99999 PR PBB SHADOW E&M-EST. PATIENT-LVL IV: ICD-10-PCS | Mod: PBBFAC,,, | Performed by: PHYSICIAN ASSISTANT

## 2022-04-29 PROCEDURE — 72052 X-RAY EXAM NECK SPINE 6/>VWS: CPT | Mod: 26,,, | Performed by: RADIOLOGY

## 2022-04-29 PROCEDURE — 3077F PR MOST RECENT SYSTOLIC BLOOD PRESSURE >= 140 MM HG: ICD-10-PCS | Mod: CPTII,S$GLB,, | Performed by: PHYSICIAN ASSISTANT

## 2022-04-29 PROCEDURE — 1101F PT FALLS ASSESS-DOCD LE1/YR: CPT | Mod: CPTII,S$GLB,, | Performed by: PHYSICIAN ASSISTANT

## 2022-04-29 PROCEDURE — 99999 PR PBB SHADOW E&M-EST. PATIENT-LVL IV: CPT | Mod: PBBFAC,,, | Performed by: PHYSICIAN ASSISTANT

## 2022-04-29 PROCEDURE — 1159F PR MEDICATION LIST DOCUMENTED IN MEDICAL RECORD: ICD-10-PCS | Mod: CPTII,S$GLB,, | Performed by: PHYSICIAN ASSISTANT

## 2022-04-29 PROCEDURE — 99215 OFFICE O/P EST HI 40 MIN: CPT | Mod: S$GLB,,, | Performed by: PHYSICIAN ASSISTANT

## 2022-04-29 PROCEDURE — 1101F PR PT FALLS ASSESS DOC 0-1 FALLS W/OUT INJ PAST YR: ICD-10-PCS | Mod: CPTII,S$GLB,, | Performed by: PHYSICIAN ASSISTANT

## 2022-04-29 PROCEDURE — 3008F BODY MASS INDEX DOCD: CPT | Mod: CPTII,S$GLB,, | Performed by: PHYSICIAN ASSISTANT

## 2022-04-29 PROCEDURE — 3078F DIAST BP <80 MM HG: CPT | Mod: CPTII,S$GLB,, | Performed by: PHYSICIAN ASSISTANT

## 2022-04-29 PROCEDURE — 72052 X-RAY EXAM NECK SPINE 6/>VWS: CPT | Mod: TC

## 2022-04-29 PROCEDURE — 1125F AMNT PAIN NOTED PAIN PRSNT: CPT | Mod: CPTII,S$GLB,, | Performed by: PHYSICIAN ASSISTANT

## 2022-04-29 PROCEDURE — 72052 XR CERVICAL SPINE 5 VIEW WITH FLEX AND EXT: ICD-10-PCS | Mod: 26,,, | Performed by: RADIOLOGY

## 2022-04-29 PROCEDURE — 3078F PR MOST RECENT DIASTOLIC BLOOD PRESSURE < 80 MM HG: ICD-10-PCS | Mod: CPTII,S$GLB,, | Performed by: PHYSICIAN ASSISTANT

## 2022-04-29 PROCEDURE — 3288F PR FALLS RISK ASSESSMENT DOCUMENTED: ICD-10-PCS | Mod: CPTII,S$GLB,, | Performed by: PHYSICIAN ASSISTANT

## 2022-04-29 PROCEDURE — 1125F PR PAIN SEVERITY QUANTIFIED, PAIN PRESENT: ICD-10-PCS | Mod: CPTII,S$GLB,, | Performed by: PHYSICIAN ASSISTANT

## 2022-04-29 PROCEDURE — 1159F MED LIST DOCD IN RCRD: CPT | Mod: CPTII,S$GLB,, | Performed by: PHYSICIAN ASSISTANT

## 2022-04-29 PROCEDURE — 3077F SYST BP >= 140 MM HG: CPT | Mod: CPTII,S$GLB,, | Performed by: PHYSICIAN ASSISTANT

## 2022-04-29 PROCEDURE — 99499 RISK ADDL DX/OHS AUDIT: ICD-10-PCS | Mod: S$GLB,,, | Performed by: PHYSICIAN ASSISTANT

## 2022-04-29 PROCEDURE — 99215 PR OFFICE/OUTPT VISIT, EST, LEVL V, 40-54 MIN: ICD-10-PCS | Mod: S$GLB,,, | Performed by: PHYSICIAN ASSISTANT

## 2022-04-29 PROCEDURE — 99499 UNLISTED E&M SERVICE: CPT | Mod: S$GLB,,, | Performed by: PHYSICIAN ASSISTANT

## 2022-04-29 PROCEDURE — 3288F FALL RISK ASSESSMENT DOCD: CPT | Mod: CPTII,S$GLB,, | Performed by: PHYSICIAN ASSISTANT

## 2022-04-29 PROCEDURE — 3008F PR BODY MASS INDEX (BMI) DOCUMENTED: ICD-10-PCS | Mod: CPTII,S$GLB,, | Performed by: PHYSICIAN ASSISTANT

## 2022-04-29 NOTE — PROGRESS NOTES
RHEUMATOLOGY OUTPATIENT CLINIC NOTE    4/29/2022    Attending Rheumatologist: Betsy Figueroa PA-C  Primary Care Provider: Nic Cuellar MD   Chief Complaint/Reason For Consultation:  Lupus      Subjective:        Alice Loza is a 71 y.o. female here today for follow up SLE, recently has been in remission but has been significant in the past.  +Raynauds stable on asa and vit e.  She has AVN both shoulders, had seen Dr. Ruiz, was planning replacements but pain improved and surgery delayed. C/o some hand pain today, tingling and shooting pain to UE. She has noticed some weakness in her hands and is careful with how she holds things.     Regarding her lupus, she is on plaquenil 3 times a week. Last several ANAs have been negative as has the dsdna, complements have been normal. utd with eye checks     She  has a h/o osteopenia and is currently on bisphosphonate holiday (fosamax).  H/o low vitamin D and takes 50K units weekly, vit D normal range.  Dexa with stable bmd 2020     She denies any fevers, hair loss, sob. no joint swelling.  Has known djd and ddd in her lumbar spine.  Has carpal tunnel type symptoms R hand on and off.  Rheumatologic systems otherwise negative.     Has not yet seen urology for eval of micro hematuria.    Serologies    Lab Results   Component Value Date    MILDRED Negative 06/04/2013      Lab Results   Component Value Date    HEPCAB Negative 01/10/2018       Current Rheum Medications:  · HCQ, vit D  Previous Rheum Medications:   · fosamax    Past Medical History:   Diagnosis Date    Avascular necrosis of hip     Left    Avascular necrosis of humeral head     Right    Cataract     Lumbar disc disease     Lupus     Osteoarthritis     Osteopenia     Osteoporosis     Raynaud phenomenon     Systemic lupus erythematosus     Tubular adenoma of colon 09/20/2016    Unspecified vitamin D deficiency      Social History     Socioeconomic History    Marital status:   "  Tobacco Use    Smoking status: Never Smoker    Smokeless tobacco: Never Used   Substance and Sexual Activity    Alcohol use: No    Drug use: No    Sexual activity: Not Currently     Review of patient's allergies indicates:   Allergen Reactions    No known drug allergies        Objective:   BP (!) 143/77   Pulse 67   Ht 5' 4" (1.626 m)   Wt 64.3 kg (141 lb 12.1 oz)   BMI 24.33 kg/m²     Immunization History   Administered Date(s) Administered    COVID-19, MRNA, LN-S, PF (MODERNA FULL 0.5 ML DOSE) 01/11/2021    Influenza - High Dose - PF (65 years and older) 10/13/2015, 10/25/2016, 02/04/2020    Influenza A (H1N1) 2009 Monovalent - IM 12/16/2009    Influenza Split 10/04/2013    Pneumococcal Conjugate - 13 Valent 10/15/2014    Pneumococcal Polysaccharide - 23 Valent 01/09/2002, 02/04/2020       Current Outpatient Medications:     cyclobenzaprine (FLEXERIL) 10 MG tablet, Take 1 tablet (10 mg total) by mouth every evening., Disp: 90 tablet, Rfl: 3    ergocalciferol (ERGOCALCIFEROL) 50,000 unit Cap, Take 1 capsule (50,000 Units total) by mouth every 7 days., Disp: 12 capsule, Rfl: 3    gabapentin (NEURONTIN) 300 MG capsule, Take 1 capsule (300 mg total) by mouth 3 (three) times daily., Disp: 90 capsule, Rfl: 11    hydrOXYchloroQUINE (PLAQUENIL) 200 mg tablet, Take one tablet by  Mouth three times a week, Disp: 36 tablet, Rfl: 3    meloxicam (MOBIC) 15 MG tablet, Take 1 tablet (15 mg total) by mouth once daily. As needed for pain, Disp: 90 tablet, Rfl: 5    nystatin (MYCOSTATIN) powder, Apply topically Twice daily., Disp: 60 g, Rfl: 3    vitamin E 400 UNIT capsule, Take 1 capsule (400 Units total) by mouth once daily., Disp: , Rfl:     desonide (DESOWEN) 0.05 % cream, Apply topically 2 (two) times daily. for 10 days, Disp: 60 g, Rfl: 2    meclizine (ANTIVERT) 12.5 mg tablet, Take 1 tablet (12.5 mg total) by mouth 2 (two) times daily as needed for Dizziness. (Patient not taking: Reported on " 4/29/2022), Disp: 14 tablet, Rfl: 0    triamcinolone acetonide 0.1% (KENALOG) 0.1 % cream, Apply topically 2 (two) times daily. Thin film to area on face for 7 days, Disp: 15 g, Rfl: 2       Recent Results (from the past 336 hour(s))   CBC Auto Differential    Collection Time: 04/22/22  9:28 AM   Result Value Ref Range    WBC 7.31 3.90 - 12.70 K/uL    RBC 4.25 4.00 - 5.40 M/uL    Hemoglobin 13.5 12.0 - 16.0 g/dL    Hematocrit 40.7 37.0 - 48.5 %    MCV 96 82 - 98 fL    MCH 31.8 (H) 27.0 - 31.0 pg    MCHC 33.2 32.0 - 36.0 g/dL    RDW 12.4 11.5 - 14.5 %    Platelets 275 150 - 450 K/uL    MPV 8.8 (L) 9.2 - 12.9 fL    Immature Granulocytes 0.3 0.0 - 0.5 %    Gran # (ANC) 3.3 1.8 - 7.7 K/uL    Immature Grans (Abs) 0.02 0.00 - 0.04 K/uL    Lymph # 3.0 1.0 - 4.8 K/uL    Mono # 0.9 0.3 - 1.0 K/uL    Eos # 0.2 0.0 - 0.5 K/uL    Baso # 0.03 0.00 - 0.20 K/uL    nRBC 0 0 /100 WBC    Gran % 44.8 38.0 - 73.0 %    Lymph % 40.9 18.0 - 48.0 %    Mono % 11.8 4.0 - 15.0 %    Eosinophil % 2.1 0.0 - 8.0 %    Basophil % 0.4 0.0 - 1.9 %    Differential Method Automated    Comprehensive Metabolic Panel    Collection Time: 04/22/22  9:28 AM   Result Value Ref Range    Sodium 142 136 - 145 mmol/L    Potassium 3.8 3.5 - 5.1 mmol/L    Chloride 107 95 - 110 mmol/L    CO2 26 23 - 29 mmol/L    Glucose 84 70 - 110 mg/dL    BUN 11 8 - 23 mg/dL    Creatinine 0.8 0.5 - 1.4 mg/dL    Calcium 9.0 8.7 - 10.5 mg/dL    Total Protein 7.8 6.0 - 8.4 g/dL    Albumin 3.5 3.5 - 5.2 g/dL    Total Bilirubin 0.9 0.1 - 1.0 mg/dL    Alkaline Phosphatase 57 55 - 135 U/L    AST 14 10 - 40 U/L    ALT 5 (L) 10 - 44 U/L    Anion Gap 9 8 - 16 mmol/L    eGFR if African American >60.0 >60 mL/min/1.73 m^2    eGFR if non African American >60.0 >60 mL/min/1.73 m^2   C-Reactive Protein    Collection Time: 04/22/22  9:28 AM   Result Value Ref Range    CRP 7.0 0.0 - 8.2 mg/L   Sedimentation rate    Collection Time: 04/22/22  9:28 AM   Result Value Ref Range    Sed Rate 19 0 - 20  mm/Hr   Anti-DNA Ab, Double-Stranded    Collection Time: 04/22/22  9:28 AM   Result Value Ref Range    ds DNA Ab Negative 1:10 Negative 1:10   C4 Complement    Collection Time: 04/22/22  9:28 AM   Result Value Ref Range    Complement (C-4) 31 11 - 44 mg/dL   C3 Complement    Collection Time: 04/22/22  9:28 AM   Result Value Ref Range    Complement (C-3) 128 50 - 180 mg/dL   Urinalysis    Collection Time: 04/22/22 10:32 AM   Result Value Ref Range    Specimen UA Urine, Clean Catch     Color, UA Yellow Yellow, Straw, Mary    Appearance, UA Clear Clear    pH, UA 7.0 5.0 - 8.0    Specific Gravity, UA 1.020 1.005 - 1.030    Protein, UA Negative Negative    Glucose, UA Negative Negative    Ketones, UA Negative Negative    Bilirubin (UA) Negative Negative    Occult Blood UA 1+ (A) Negative    Nitrite, UA Negative Negative    Leukocytes, UA Negative Negative   Urinalysis Microscopic    Collection Time: 04/22/22 10:32 AM   Result Value Ref Range    RBC, UA 7 (H) 0 - 4 /hpf    WBC, UA 1 0 - 5 /hpf    Bacteria Occasional None-Occ /hpf    Squam Epithel, UA 3 /hpf    Ca Oxalate Sherrie, UA Moderate None-Moderate    Microscopic Comment SEE COMMENT        Imaging:  All imaging reviewed and independently interpreted by me.    XR CERVICAL SPINE 5 VIEW WITH FLEX AND EXT 4/29/22  FINDINGS:  Vertebral body heights maintained.  No spondylolisthesis.  Similar degenerative disc height loss and osteophyte changes noted at C4-5 and C5-6.  Minimal anterolisthesis of C3 on C4 in flexion with alignment unchanged in extension compared to neutral.  No high-grade neural foraminal narrowing.  Multilevel facet arthropathy.  Lung apices clear.  Prevertebral soft tissues unremarkable.    Assessment:     1. Other systemic lupus erythematosus with other organ involvement    2. Medication monitoring encounter    3. Raynaud's phenomenon without gangrene    4. Primary osteoarthritis involving multiple joints    5. Radicular syndrome of upper limbs    6.  Microscopic hematuria            Plan:       · Refer to urology for eval micro hematuria  · Update xray c-spine today and refer to back and spine clinic for further eval  ·  Lupus w/o signs of activity at this time. lab results reviewed and discussed findings with the patient. Cont HCQ as prescribed  · Return to clinic: 4 mos w/ sle labs    The patient understands, chooses and consents to this plan and accepts all the risks which include but are not limited to the risks mentioned above.     Method of contact with patient concerns: [facility]    60 minutes of total time spent on the encounter, which includes face to face time and non-face to face time preparing to see the patient (eg, review of tests), Obtaining and/or reviewing separately obtained history, Documenting clinical information in the electronic or other health record, Independently interpreting results (not separately reported) and communicating results to the patient/family/caregiver, or Care coordination (not separately reported).          Disclaimer: This note was prepared using a voice recognition system and is likely to have sound alike errors within the text.       Betsy Figueroa PA-C  Rheumatology Department   [facility] [place]

## 2022-05-06 ENCOUNTER — OFFICE VISIT (OUTPATIENT)
Dept: PAIN MEDICINE | Facility: CLINIC | Age: 72
End: 2022-05-06
Payer: MEDICARE

## 2022-05-06 VITALS
SYSTOLIC BLOOD PRESSURE: 158 MMHG | WEIGHT: 139.31 LBS | RESPIRATION RATE: 17 BRPM | HEART RATE: 69 BPM | DIASTOLIC BLOOD PRESSURE: 79 MMHG | BODY MASS INDEX: 23.78 KG/M2 | HEIGHT: 64 IN

## 2022-05-06 DIAGNOSIS — Z96.642 HISTORY OF LEFT HIP REPLACEMENT: Primary | ICD-10-CM

## 2022-05-06 DIAGNOSIS — M54.12 RADICULAR SYNDROME OF UPPER LIMBS: ICD-10-CM

## 2022-05-06 PROCEDURE — 99999 PR PBB SHADOW E&M-EST. PATIENT-LVL IV: CPT | Mod: PBBFAC,,, | Performed by: PHYSICAL MEDICINE & REHABILITATION

## 2022-05-06 PROCEDURE — 1125F PR PAIN SEVERITY QUANTIFIED, PAIN PRESENT: ICD-10-PCS | Mod: CPTII,S$GLB,, | Performed by: PHYSICAL MEDICINE & REHABILITATION

## 2022-05-06 PROCEDURE — 3078F PR MOST RECENT DIASTOLIC BLOOD PRESSURE < 80 MM HG: ICD-10-PCS | Mod: CPTII,S$GLB,, | Performed by: PHYSICAL MEDICINE & REHABILITATION

## 2022-05-06 PROCEDURE — 3288F PR FALLS RISK ASSESSMENT DOCUMENTED: ICD-10-PCS | Mod: CPTII,S$GLB,, | Performed by: PHYSICAL MEDICINE & REHABILITATION

## 2022-05-06 PROCEDURE — 1160F PR REVIEW ALL MEDS BY PRESCRIBER/CLIN PHARMACIST DOCUMENTED: ICD-10-PCS | Mod: CPTII,S$GLB,, | Performed by: PHYSICAL MEDICINE & REHABILITATION

## 2022-05-06 PROCEDURE — 3077F PR MOST RECENT SYSTOLIC BLOOD PRESSURE >= 140 MM HG: ICD-10-PCS | Mod: CPTII,S$GLB,, | Performed by: PHYSICAL MEDICINE & REHABILITATION

## 2022-05-06 PROCEDURE — 99204 OFFICE O/P NEW MOD 45 MIN: CPT | Mod: S$GLB,,, | Performed by: PHYSICAL MEDICINE & REHABILITATION

## 2022-05-06 PROCEDURE — 1101F PT FALLS ASSESS-DOCD LE1/YR: CPT | Mod: CPTII,S$GLB,, | Performed by: PHYSICAL MEDICINE & REHABILITATION

## 2022-05-06 PROCEDURE — 1159F MED LIST DOCD IN RCRD: CPT | Mod: CPTII,S$GLB,, | Performed by: PHYSICAL MEDICINE & REHABILITATION

## 2022-05-06 PROCEDURE — 3288F FALL RISK ASSESSMENT DOCD: CPT | Mod: CPTII,S$GLB,, | Performed by: PHYSICAL MEDICINE & REHABILITATION

## 2022-05-06 PROCEDURE — 1125F AMNT PAIN NOTED PAIN PRSNT: CPT | Mod: CPTII,S$GLB,, | Performed by: PHYSICAL MEDICINE & REHABILITATION

## 2022-05-06 PROCEDURE — 1160F RVW MEDS BY RX/DR IN RCRD: CPT | Mod: CPTII,S$GLB,, | Performed by: PHYSICAL MEDICINE & REHABILITATION

## 2022-05-06 PROCEDURE — 1159F PR MEDICATION LIST DOCUMENTED IN MEDICAL RECORD: ICD-10-PCS | Mod: CPTII,S$GLB,, | Performed by: PHYSICAL MEDICINE & REHABILITATION

## 2022-05-06 PROCEDURE — 3078F DIAST BP <80 MM HG: CPT | Mod: CPTII,S$GLB,, | Performed by: PHYSICAL MEDICINE & REHABILITATION

## 2022-05-06 PROCEDURE — 3008F PR BODY MASS INDEX (BMI) DOCUMENTED: ICD-10-PCS | Mod: CPTII,S$GLB,, | Performed by: PHYSICAL MEDICINE & REHABILITATION

## 2022-05-06 PROCEDURE — 99999 PR PBB SHADOW E&M-EST. PATIENT-LVL IV: ICD-10-PCS | Mod: PBBFAC,,, | Performed by: PHYSICAL MEDICINE & REHABILITATION

## 2022-05-06 PROCEDURE — 3008F BODY MASS INDEX DOCD: CPT | Mod: CPTII,S$GLB,, | Performed by: PHYSICAL MEDICINE & REHABILITATION

## 2022-05-06 PROCEDURE — 1101F PR PT FALLS ASSESS DOC 0-1 FALLS W/OUT INJ PAST YR: ICD-10-PCS | Mod: CPTII,S$GLB,, | Performed by: PHYSICAL MEDICINE & REHABILITATION

## 2022-05-06 PROCEDURE — 99204 PR OFFICE/OUTPT VISIT, NEW, LEVL IV, 45-59 MIN: ICD-10-PCS | Mod: S$GLB,,, | Performed by: PHYSICAL MEDICINE & REHABILITATION

## 2022-05-06 PROCEDURE — 3077F SYST BP >= 140 MM HG: CPT | Mod: CPTII,S$GLB,, | Performed by: PHYSICAL MEDICINE & REHABILITATION

## 2022-05-06 RX ORDER — MELOXICAM 15 MG/1
15 TABLET ORAL DAILY
Qty: 90 TABLET | Refills: 2 | Status: SHIPPED | OUTPATIENT
Start: 2022-05-06

## 2022-05-06 NOTE — PROGRESS NOTES
New Patient Chronic Pain Note (Initial Visit)    Referring Physician: Betsy Figueroa PA-C    PCP: Nic Cuellar MD    Chief Complaint:   Chief Complaint   Patient presents with    Leg Pain     Left         SUBJECTIVE:    Alice Loza is a 71 y.o. female who presents to the clinic for the evaluation of upper extremity pain.  She also reports having your left hip pain.  Patient has a history of a left hip replacement with revision.  She was referred by the rheumatology department for further evaluation and management of this pain.  She has past medical history of lupus, cataracts, diverticulitis, osteoporosis, arthritis, multiple other medical comorbidities as listed in her chart.  The pain started 1 year ago and symptoms have been unchanged.The pain is located in the left hand , but this area has improved.  She has been dealing with left hip pain for quite some time ever since hip joint replacement.  The pain is described as Stabbing and is rated as 5/10. The pain is rated with a score of  3/10 on the BEST day and a score of 9/10 on the WORST day.  Symptoms interfere with daily activity. The pain is exacerbated by movement.  The pain is mitigated by nothing in particular.     Patient denies night fever/night sweats, urinary incontinence, bowel incontinence, significant weight loss, significant motor weakness and loss of sensations.    Pain Disability Index Review:  Last 3 PDI Scores 5/6/2022   Pain Disability Index (PDI) 35       Non-Pharmacologic Treatments:  Physical Therapy/Home Exercise: yes  Ice/Heat:yes  TENS: no  Acupuncture: no  Massage: no  Chiropractic: no    Other: no      Pain Medications:  - Opioids: None  - Adjuvant Medications: Flexeril, gabapentin, Plaquenil, Mobic  - Anti-Coagulants: None     report:  Reviewed and consistent with medication use as prescribed.  No controlled substances recently prescribed.    Pain Procedures:   -previous left hip joint replacement      Imaging:   X-ray  cervical spine 04/29/2022:  Vertebral body heights maintained.  No spondylolisthesis.  Similar degenerative disc height loss and osteophyte changes noted at C4-5 and C5-6.  Minimal anterolisthesis of C3 on C4 in flexion with alignment unchanged in extension compared to neutral.  No high-grade neural foraminal narrowing.  Multilevel facet arthropathy.  Lung apices clear.  Prevertebral soft tissues unremarkable.       Past Medical History:   Diagnosis Date    Avascular necrosis of hip     Left    Avascular necrosis of humeral head     Right    Cataract     Lumbar disc disease     Lupus     Osteoarthritis     Osteopenia     Osteoporosis     Raynaud phenomenon     Systemic lupus erythematosus     Tubular adenoma of colon 09/20/2016    Unspecified vitamin D deficiency      Past Surgical History:   Procedure Laterality Date    CHOLECYSTECTOMY      COLONOSCOPY N/A 9/20/2016    Procedure: COLONOSCOPY;  Surgeon: Aroldo Raphael MD;  Location: [facility];  Service: Endoscopy;  Laterality: N/A;    TOTAL HIP ARTHROPLASTY      Left     Social History     Socioeconomic History    Marital status:    Tobacco Use    Smoking status: Never Smoker    Smokeless tobacco: Never Used   Substance and Sexual Activity    Alcohol use: No    Drug use: No    Sexual activity: Not Currently     Family History   Problem Relation Age of Onset    Cervical cancer Mother     Heart attack Father 62    Breast cancer Sister        Review of patient's allergies indicates:   Allergen Reactions    No known drug allergies        Current Outpatient Medications   Medication Sig    cyclobenzaprine (FLEXERIL) 10 MG tablet Take 1 tablet (10 mg total) by mouth every evening.    ergocalciferol (ERGOCALCIFEROL) 50,000 unit Cap Take 1 capsule (50,000 Units total) by mouth every 7 days.    gabapentin (NEURONTIN) 300 MG capsule Take 1 capsule (300 mg total) by mouth 3 (three) times daily.    hydrOXYchloroQUINE (PLAQUENIL) 200 mg  "tablet Take one tablet by  Mouth three times a week    meclizine (ANTIVERT) 12.5 mg tablet Take 1 tablet (12.5 mg total) by mouth 2 (two) times daily as needed for Dizziness.    nystatin (MYCOSTATIN) powder Apply topically Twice daily.    vitamin E 400 UNIT capsule Take 1 capsule (400 Units total) by mouth once daily.    desonide (DESOWEN) 0.05 % cream Apply topically 2 (two) times daily. for 10 days    meloxicam (MOBIC) 15 MG tablet Take 1 tablet (15 mg total) by mouth once daily. As needed for pain    triamcinolone acetonide 0.1% (KENALOG) 0.1 % cream Apply topically 2 (two) times daily. Thin film to area on face for 7 days     No current facility-administered medications for this visit.       Review of Systems     GENERAL:  No weight loss, malaise or fevers.  HEENT:   No recent changes in vision or hearing  NECK:  Negative for lumps, no difficulty with swallowing.  RESPIRATORY:  Negative for cough, wheezing or shortness of breath, patient denies any recent URI.  CARDIOVASCULAR:  Negative for chest pain, leg swelling or palpitations.  GI:  Negative for abdominal discomfort, blood in stools or black stools or change in bowel habits.  MUSCULOSKELETAL:  See HPI.  SKIN:  Negative for lesions, rash, and itching.  PSYCH:  No mood disorder or recent psychosocial stressors.  Patients sleep is not disturbed secondary to pain.  HEMATOLOGY/LYMPHOLOGY:  Negative for prolonged bleeding, bruising easily or swollen nodes.  Patient is not currently taking any anti-coagulants  NEURO:   No history of headaches, syncope, paralysis, seizures or tremors.  All other reviewed and negative other than HPI.    OBJECTIVE:    BP (!) 158/79   Pulse 69   Resp 17   Ht 5' 4" (1.626 m)   Wt 63.2 kg (139 lb 5.3 oz)   BMI 23.92 kg/m²         Physical Exam    GENERAL: Well appearing, in no acute distress, alert and oriented x3.  PSYCH:  Mood and affect appropriate.  SKIN: Skin color, texture, turgor normal, no rashes or " lesions.  HEAD/FACE:  Normocephalic, atraumatic. Cranial nerves grossly intact.  NECK: No pain to palpation over the cervical paraspinous muscles. Spurling Negative. No pain with neck flexion, extension, or lateral flexion.   CV: RRR with palpation of the radial artery.  PULM: No evidence of respiratory difficulty, symmetric chest rise.  GI:  Soft and non-tender.  BACK: Straight leg raising in the sitting and supine positions is negative to radicular pain. No pain to palpation over the facet joints of the lumbar spine or spinous processes. Normal range of motion without pain reproduction.  EXTREMITIES: Peripheral joint ROM is full and pain free without obvious instability or laxity in all four extremities. No deformities, edema, or skin discoloration. Good capillary refill.  MUSCULOSKELETAL: Shoulder, hip, and knee provocative maneuvers are unremarkable.  There is no pain with palpation over the sacroiliac joints bilaterally.  FABERs test is negative.  FADIRs test is negative.   Bilateral upper and lower extremity strength is normal and symmetric.  No atrophy or tone abnormalities are noted.  NEURO: Bilateral upper and lower extremity coordination and muscle stretch reflexes are physiologic and symmetric.  Plantar response are downgoing. No clonus.  No loss of sensation is noted.  GAIT: normal.      LABS:  Lab Results   Component Value Date    WBC 7.31 04/22/2022    HGB 13.5 04/22/2022    HCT 40.7 04/22/2022    MCV 96 04/22/2022     04/22/2022       CMP  Sodium   Date Value Ref Range Status   04/22/2022 142 136 - 145 mmol/L Final     Potassium   Date Value Ref Range Status   04/22/2022 3.8 3.5 - 5.1 mmol/L Final     Chloride   Date Value Ref Range Status   04/22/2022 107 95 - 110 mmol/L Final     CO2   Date Value Ref Range Status   04/22/2022 26 23 - 29 mmol/L Final     Glucose   Date Value Ref Range Status   04/22/2022 84 70 - 110 mg/dL Final     BUN   Date Value Ref Range Status   04/22/2022 11 8 - 23 mg/dL  Final     Creatinine   Date Value Ref Range Status   04/22/2022 0.8 0.5 - 1.4 mg/dL Final     Calcium   Date Value Ref Range Status   04/22/2022 9.0 8.7 - 10.5 mg/dL Final     Total Protein   Date Value Ref Range Status   04/22/2022 7.8 6.0 - 8.4 g/dL Final     Albumin   Date Value Ref Range Status   04/22/2022 3.5 3.5 - 5.2 g/dL Final     Total Bilirubin   Date Value Ref Range Status   04/22/2022 0.9 0.1 - 1.0 mg/dL Final     Comment:     For infants and newborns, interpretation of results should be based  on gestational age, weight and in agreement with clinical  observations.    Premature Infant recommended reference ranges:  Up to 24 hours.............<8.0 mg/dL  Up to 48 hours............<12.0 mg/dL  3-5 days..................<15.0 mg/dL  6-29 days.................<15.0 mg/dL       Alkaline Phosphatase   Date Value Ref Range Status   04/22/2022 57 55 - 135 U/L Final     AST   Date Value Ref Range Status   04/22/2022 14 10 - 40 U/L Final     ALT   Date Value Ref Range Status   04/22/2022 5 (L) 10 - 44 U/L Final     Anion Gap   Date Value Ref Range Status   04/22/2022 9 8 - 16 mmol/L Final     eGFR if    Date Value Ref Range Status   04/22/2022 >60.0 >60 mL/min/1.73 m^2 Final     eGFR if non    Date Value Ref Range Status   04/22/2022 >60.0 >60 mL/min/1.73 m^2 Final     Comment:     Calculation used to obtain the estimated glomerular filtration  rate (eGFR) is the CKD-EPI equation.          No results found for: LABA1C, HGBA1C          ASSESSMENT: 71 y.o. year old female with left hand and left hip pain, consistent with     1. History of left hip replacement     2. Radicular syndrome of upper limbs  Ambulatory referral[facility]consult [facility]         PLAN:   - Interventions: None at this time.    - Anticoagulation use: no     - Medications: I have stressed the importance of physical activity and a home exercise plan to help with pain and improve health. and Patient can  continue with medications for now since they are providing benefits, using them appropriately, and without side effects.     Refill Mobic 15 mg daily p.r.n.    - Therapy:  Advised patient continue with activities as tolerated    - Labs:  Reviewed    - Imaging: Reviewed available imaging with patient and answered any questions they had regarding study.    - Consults/Referrals:  None at this yuriy    - Records:  Reviewed/Obtain old records from outside physicians and imaging    - Follow up visit: return to clinic as needed  -The patient's follow-up will be with our physician assistant, Melissa Downs PA-C.    - Counseled patient regarding the importance of activity modification, smoking cessation, alcohol cessation, constant sleeping habits and physical therapy    - This condition does not require this patient to take time off of work, and the primary goal of our Pain Management services is to improve the patient's functional capacity.    - Patient Questions: Answered all of the patient's questions regarding diagnosis, therapy, and treatment        The above plan and management options were discussed at length with patient. Patient is in agreement with the above and verbalized understanding.    I discussed the goals of interventional chronic pain management with the patient on today's visit.  I explained the utility of injections for diagnostic and therapeutic purposes.  We discussed a multimodal approach to pain including treating the patient's given worst pain at any given time.  We will use a systematic approach to addressing pain.  We will also adopt a multimodal approach that includes injections, adjuvant medications, physical therapy, at times psychiatry.  There may be a limited role for opioid use intermittently in the treatment of pain, more particularly for acute pain although no one approach can be used as a sole treatment modality.    I emphasized the importance of regular exercise, core strengthening and  stretching, diet and weight loss as a cornerstone of long-term pain management.      Edmar Prater MD  Interventional Pain Management  [facility]    Disclaimer:  This note was prepared using voice recognition system and is likely to have sound alike errors that may have been overlooked even after proof reading.  Please call me with any questions

## 2022-05-24 ENCOUNTER — PATIENT OUTREACH (OUTPATIENT)
Dept: ADMINISTRATIVE | Facility: HOSPITAL | Age: 72
End: 2022-05-24
Payer: MEDICARE

## 2022-05-24 NOTE — PROGRESS NOTES
Working Not Seen in 12 months.     [facility]-no results found.  [facility]-no results found.  Care Everywhere-no recent results found.    Assisted to schedule annual exam, 6/01/22.

## 2022-06-01 ENCOUNTER — LAB VISIT (OUTPATIENT)
Dept: LAB | Facility: HOSPITAL | Age: 72
End: 2022-06-01
Attending: FAMILY MEDICINE
Payer: MEDICARE

## 2022-06-01 ENCOUNTER — OFFICE VISIT (OUTPATIENT)
Dept: FAMILY MEDICINE | Facility: CLINIC | Age: 72
End: 2022-06-01
Payer: MEDICARE

## 2022-06-01 VITALS
HEART RATE: 74 BPM | HEIGHT: 64 IN | BODY MASS INDEX: 23.56 KG/M2 | DIASTOLIC BLOOD PRESSURE: 68 MMHG | SYSTOLIC BLOOD PRESSURE: 134 MMHG | TEMPERATURE: 98 F | WEIGHT: 138 LBS

## 2022-06-01 DIAGNOSIS — M87.029 AVASCULAR NECROSIS OF HEAD OF HUMERUS, UNSPECIFIED LATERALITY: ICD-10-CM

## 2022-06-01 DIAGNOSIS — M85.80 OSTEOPENIA, UNSPECIFIED LOCATION: ICD-10-CM

## 2022-06-01 DIAGNOSIS — Z00.00 ROUTINE HISTORY AND PHYSICAL EXAMINATION OF ADULT: Primary | ICD-10-CM

## 2022-06-01 DIAGNOSIS — M32.8 OTHER FORMS OF SYSTEMIC LUPUS ERYTHEMATOSUS, UNSPECIFIED ORGAN INVOLVEMENT STATUS: ICD-10-CM

## 2022-06-01 DIAGNOSIS — M32.19 OTHER SYSTEMIC LUPUS ERYTHEMATOSUS WITH OTHER ORGAN INVOLVEMENT: ICD-10-CM

## 2022-06-01 DIAGNOSIS — D84.9 IMMUNOCOMPROMISED: ICD-10-CM

## 2022-06-01 DIAGNOSIS — Z12.31 ENCOUNTER FOR SCREENING MAMMOGRAM FOR MALIGNANT NEOPLASM OF BREAST: ICD-10-CM

## 2022-06-01 DIAGNOSIS — I73.00 RAYNAUD'S PHENOMENON WITHOUT GANGRENE: ICD-10-CM

## 2022-06-01 DIAGNOSIS — Z12.11 SCREENING FOR COLON CANCER: ICD-10-CM

## 2022-06-01 DIAGNOSIS — Z00.00 ROUTINE HISTORY AND PHYSICAL EXAMINATION OF ADULT: ICD-10-CM

## 2022-06-01 DIAGNOSIS — Z86.010 HISTORY OF ADENOMATOUS POLYP OF COLON: ICD-10-CM

## 2022-06-01 PROBLEM — Z86.0101 HISTORY OF ADENOMATOUS POLYP OF COLON: Status: ACTIVE | Noted: 2022-06-01

## 2022-06-01 LAB
CHOLEST SERPL-MCNC: 220 MG/DL (ref 120–199)
CHOLEST/HDLC SERPL: 3.4 {RATIO} (ref 2–5)
HDLC SERPL-MCNC: 64 MG/DL (ref 40–75)
HDLC SERPL: 29.1 % (ref 20–50)
LDLC SERPL CALC-MCNC: 138.2 MG/DL (ref 63–159)
NONHDLC SERPL-MCNC: 156 MG/DL
TRIGL SERPL-MCNC: 89 MG/DL (ref 30–150)

## 2022-06-01 PROCEDURE — 3008F PR BODY MASS INDEX (BMI) DOCUMENTED: ICD-10-PCS | Mod: CPTII,S$GLB,, | Performed by: FAMILY MEDICINE

## 2022-06-01 PROCEDURE — 3075F SYST BP GE 130 - 139MM HG: CPT | Mod: CPTII,S$GLB,, | Performed by: FAMILY MEDICINE

## 2022-06-01 PROCEDURE — 1159F MED LIST DOCD IN RCRD: CPT | Mod: CPTII,S$GLB,, | Performed by: FAMILY MEDICINE

## 2022-06-01 PROCEDURE — 3008F BODY MASS INDEX DOCD: CPT | Mod: CPTII,S$GLB,, | Performed by: FAMILY MEDICINE

## 2022-06-01 PROCEDURE — 80061 LIPID PANEL: CPT | Performed by: FAMILY MEDICINE

## 2022-06-01 PROCEDURE — 3288F PR FALLS RISK ASSESSMENT DOCUMENTED: ICD-10-PCS | Mod: CPTII,S$GLB,, | Performed by: FAMILY MEDICINE

## 2022-06-01 PROCEDURE — 99499 UNLISTED E&M SERVICE: CPT | Mod: S$GLB,,, | Performed by: FAMILY MEDICINE

## 2022-06-01 PROCEDURE — 1159F PR MEDICATION LIST DOCUMENTED IN MEDICAL RECORD: ICD-10-PCS | Mod: CPTII,S$GLB,, | Performed by: FAMILY MEDICINE

## 2022-06-01 PROCEDURE — 36415 COLL VENOUS BLD VENIPUNCTURE: CPT | Mod: PO | Performed by: FAMILY MEDICINE

## 2022-06-01 PROCEDURE — 99499 RISK ADDL DX/OHS AUDIT: ICD-10-PCS | Mod: S$GLB,,, | Performed by: FAMILY MEDICINE

## 2022-06-01 PROCEDURE — 3078F DIAST BP <80 MM HG: CPT | Mod: CPTII,S$GLB,, | Performed by: FAMILY MEDICINE

## 2022-06-01 PROCEDURE — 99397 PR PREVENTIVE VISIT,EST,65 & OVER: ICD-10-PCS | Mod: GY,S$GLB,, | Performed by: FAMILY MEDICINE

## 2022-06-01 PROCEDURE — 3078F PR MOST RECENT DIASTOLIC BLOOD PRESSURE < 80 MM HG: ICD-10-PCS | Mod: CPTII,S$GLB,, | Performed by: FAMILY MEDICINE

## 2022-06-01 PROCEDURE — 99999 PR PBB SHADOW E&M-EST. PATIENT-LVL IV: ICD-10-PCS | Mod: PBBFAC,,, | Performed by: FAMILY MEDICINE

## 2022-06-01 PROCEDURE — 1101F PT FALLS ASSESS-DOCD LE1/YR: CPT | Mod: CPTII,S$GLB,, | Performed by: FAMILY MEDICINE

## 2022-06-01 PROCEDURE — 99999 PR PBB SHADOW E&M-EST. PATIENT-LVL IV: CPT | Mod: PBBFAC,,, | Performed by: FAMILY MEDICINE

## 2022-06-01 PROCEDURE — 1101F PR PT FALLS ASSESS DOC 0-1 FALLS W/OUT INJ PAST YR: ICD-10-PCS | Mod: CPTII,S$GLB,, | Performed by: FAMILY MEDICINE

## 2022-06-01 PROCEDURE — 99397 PER PM REEVAL EST PAT 65+ YR: CPT | Mod: GY,S$GLB,, | Performed by: FAMILY MEDICINE

## 2022-06-01 PROCEDURE — 3075F PR MOST RECENT SYSTOLIC BLOOD PRESS GE 130-139MM HG: ICD-10-PCS | Mod: CPTII,S$GLB,, | Performed by: FAMILY MEDICINE

## 2022-06-01 PROCEDURE — 3288F FALL RISK ASSESSMENT DOCD: CPT | Mod: CPTII,S$GLB,, | Performed by: FAMILY MEDICINE

## 2022-06-01 RX ORDER — NYSTATIN 100000 [USP'U]/G
POWDER TOPICAL
Qty: 60 G | Refills: 3 | Status: SHIPPED | OUTPATIENT
Start: 2022-06-01

## 2022-06-01 RX ORDER — TRIAMCINOLONE ACETONIDE 1 MG/G
CREAM TOPICAL 2 TIMES DAILY PRN
Qty: 45 G | Refills: 1 | Status: SHIPPED | OUTPATIENT
Start: 2022-06-01 | End: 2022-06-08

## 2022-06-01 RX ORDER — TRIAMCINOLONE ACETONIDE 1 MG/G
CREAM TOPICAL 2 TIMES DAILY
COMMUNITY
End: 2022-06-01 | Stop reason: SDUPTHER

## 2022-06-01 NOTE — PATIENT INSTRUCTIONS
Recommend tetanus/diptheria (Tdap) and shingles (Shingrx) vaccines at the pharmacy. Recommend COVID booster.   Endoscopy scheduling will call you to schedule the colonoscopy

## 2022-06-01 NOTE — PROGRESS NOTES
Patient presents physical exam.  She has lupus with Raynaud's phenomenon followed by Rheumatology, stable.  Previous avascular necrosis of the shoulders.  Osteopenia not currently under treatment.  She is due for colonoscopy.    Alice was seen today for annual exam.    Diagnoses and all orders for this visit:    Routine history and physical examination of adult  -     Lipid Panel; Future  -     Mammo Digital Screening Bilat w/ Sebastián; Future  -     Ambulatory referral/consult to Endo Procedure ; Future    Other forms of systemic lupus erythematosus, unspecified organ involvement status    Raynaud's phenomenon without gangrene    Osteopenia, unspecified location    Avascular necrosis of head of humerus, unspecified laterality    History of adenomatous polyp of colon  -     Ambulatory referral/consult to Endo Procedure ; Future    Immunocompromised    Other systemic lupus erythematosus with other organ involvement  -     triamcinolone acetonide 0.1% (KENALOG) 0.1 % cream; Apply topically 2 (two) times daily as needed. Thin film to area on face    Encounter for screening mammogram for malignant neoplasm of breast  -     Mammo Digital Screening Bilat w/ Sebastián; Future    Screening for colon cancer  -     Ambulatory referral/consult to Endo Procedure ; Future    Other orders  -     nystatin (MYCOSTATIN) powder; Apply topically Twice daily.        Reviewed recent laboratory within epic.  Continue current medications.  Continue follow-up Rheumatology.  Anticipatory guidance: Don't smoke.  Healthy diet and regular exercise recommended.          Past Medical History:  Past Medical History:   Diagnosis Date    Avascular necrosis of hip     Left    Avascular necrosis of humeral head     Right    Cataract     Lumbar disc disease     Lupus     Osteoarthritis     Osteopenia     Osteoporosis     Raynaud phenomenon     Systemic lupus erythematosus     Tubular adenoma of colon 09/20/2016     Unspecified vitamin D deficiency      Past Surgical History:   Procedure Laterality Date    CHOLECYSTECTOMY      COLONOSCOPY N/A 9/20/2016    Procedure: COLONOSCOPY;  Surgeon: Aroldo Raphael MD;  Location: [facility];  Service: Endoscopy;  Laterality: N/A;    TOTAL HIP ARTHROPLASTY      Left     Review of patient's allergies indicates:   Allergen Reactions    No known drug allergies      Current Outpatient Medications on File Prior to Visit   Medication Sig Dispense Refill    cyclobenzaprine (FLEXERIL) 10 MG tablet Take 1 tablet (10 mg total) by mouth every evening. 90 tablet 3    ergocalciferol (ERGOCALCIFEROL) 50,000 unit Cap Take 1 capsule (50,000 Units total) by mouth every 7 days. 12 capsule 3    gabapentin (NEURONTIN) 300 MG capsule Take 1 capsule (300 mg total) by mouth 3 (three) times daily. 90 capsule 11    hydrOXYchloroQUINE (PLAQUENIL) 200 mg tablet Take one tablet by  Mouth three times a week 36 tablet 3    meclizine (ANTIVERT) 12.5 mg tablet Take 1 tablet (12.5 mg total) by mouth 2 (two) times daily as needed for Dizziness. 14 tablet 0    meloxicam (MOBIC) 15 MG tablet Take 1 tablet (15 mg total) by mouth once daily. As needed for pain 90 tablet 2    vitamin E 400 UNIT capsule Take 1 capsule (400 Units total) by mouth once daily.      [DISCONTINUED] nystatin (MYCOSTATIN) powder Apply topically Twice daily. 60 g 3    [DISCONTINUED] triamcinolone acetonide 0.1% (KENALOG) 0.1 % cream Apply topically 2 (two) times daily.      desonide (DESOWEN) 0.05 % cream Apply topically 2 (two) times daily. for 10 days 60 g 2    [DISCONTINUED] triamcinolone acetonide 0.1% (KENALOG) 0.1 % cream Apply topically 2 (two) times daily. Thin film to area on face for 7 days 15 g 2     No current facility-administered medications on file prior to visit.     Social History     Socioeconomic History    Marital status:    Tobacco Use    Smoking status: Never Smoker    Smokeless tobacco: Never Used  "  Substance and Sexual Activity    Alcohol use: No    Drug use: No    Sexual activity: Not Currently     Family History   Problem Relation Age of Onset    Cervical cancer Mother     Heart attack Father 62    Breast cancer Sister              ROS:  GENERAL: No fever, chills,  or significant weight changes.  HEENT: No headache or hearing complaints.  No dysphagia  Eyes: No vision complaints  CHEST: Denies KOROMA, cyanosis, wheezing, cough and sputum production.  CARDIOVASCULAR: Denies chest pain, PND, orthopnea or reduced exercise tolerance.  ABDOMEN: Appetite fine. Denies diarrhea, abdominal pain, hematemesis or blood in stool.  URINARY: No flank pain, dysuria or hematuria.  MUSCULOSKELETAL: No warmth swelling or tenderness of the joints  NEUROLOGIC: No focal weakness numbness or paresthesia  PSYCHIATRIC: Denies depression        Vitals:    06/01/22 0908   BP: 134/68   Pulse: 74   Temp: 97.5 °F (36.4 °C)   TempSrc: Temporal   Weight: 62.6 kg (138 lb)   Height: 5' 4" (1.626 m)     Wt Readings from Last 3 Encounters:   06/01/22 62.6 kg (138 lb)   05/06/22 63.2 kg (139 lb 5.3 oz)   04/29/22 64.3 kg (141 lb 12.1 oz)       OBJECTIVE:   APPEARANCE: Well nourished, well developed, in no acute distress.    HEAD: Normocephalic.  Atraumatic.  No sinus tenderness.  EYES:   Right eye: Pupil reactive.  Conjunctiva clear.    Left eye: Pupil reactive.  Conjunctiva clear.  EOMI.    EARS: TM's intact. Light reflex normal. No retraction or perforation.    NOSE:  clear.  MOUTH & THROAT:  No pharyngeal erythema or exudate. No lesions.  NECK: Supple. No bruits.  No JVD.  No cervical lymphadenopathy.  No thyromegaly.    CHEST: Breath sounds clear bilaterally.  Normal respiratory effort  CARDIOVASCULAR: Normal rate.  Regular rhythm.  No murmurs.  No rub.  No gallops.  ABDOMEN: Bowel sounds normal.  Soft.  No tenderness.  No organomegaly.  PERIPHERAL VASCULAR: No cyanosis.  No clubbing.  No edema.  NEUROLOGIC: No focal findings.  MENTAL " STATUS: Alert.  Oriented x 3.

## 2022-06-07 ENCOUNTER — HOSPITAL ENCOUNTER (OUTPATIENT)
Dept: RADIOLOGY | Facility: HOSPITAL | Age: 72
Discharge: HOME | End: 2022-06-07
Attending: FAMILY MEDICINE
Payer: MEDICARE

## 2022-06-07 VITALS — BODY MASS INDEX: 23.56 KG/M2 | WEIGHT: 138 LBS | HEIGHT: 64 IN

## 2022-06-07 DIAGNOSIS — Z12.31 ENCOUNTER FOR SCREENING MAMMOGRAM FOR MALIGNANT NEOPLASM OF BREAST: ICD-10-CM

## 2022-06-07 DIAGNOSIS — Z00.00 ROUTINE HISTORY AND PHYSICAL EXAMINATION OF ADULT: ICD-10-CM

## 2022-06-07 PROCEDURE — 77067 MAMMO DIGITAL SCREENING BILAT WITH TOMO: ICD-10-PCS | Mod: 26,,, | Performed by: RADIOLOGY

## 2022-06-07 PROCEDURE — 77063 BREAST TOMOSYNTHESIS BI: CPT | Mod: 26,,, | Performed by: RADIOLOGY

## 2022-06-07 PROCEDURE — 77063 MAMMO DIGITAL SCREENING BILAT WITH TOMO: ICD-10-PCS | Mod: 26,,, | Performed by: RADIOLOGY

## 2022-06-07 PROCEDURE — 77067 SCR MAMMO BI INCL CAD: CPT | Mod: 26,,, | Performed by: RADIOLOGY

## 2022-06-07 PROCEDURE — 77063 BREAST TOMOSYNTHESIS BI: CPT | Mod: TC,PO

## 2022-06-09 ENCOUNTER — PES CALL (OUTPATIENT)
Dept: ADMINISTRATIVE | Facility: CLINIC | Age: 72
End: 2022-06-09
Payer: MEDICARE

## 2022-06-21 DIAGNOSIS — E78.5 HYPERLIPIDEMIA, UNSPECIFIED HYPERLIPIDEMIA TYPE: Primary | ICD-10-CM

## 2022-06-21 RX ORDER — PRAVASTATIN SODIUM 20 MG/1
20 TABLET ORAL DAILY
Qty: 90 TABLET | Refills: 3 | Status: SHIPPED | OUTPATIENT
Start: 2022-06-21 | End: 2023-06-21

## 2022-06-21 NOTE — TELEPHONE ENCOUNTER
No new care gaps identified.  [facility] Embedded Care Gaps. Reference number: [id number]. 6/21/2022   2:06:29 PM CDT   Statement Selected

## 2022-06-21 NOTE — TELEPHONE ENCOUNTER
----- Message from Cammie Tate sent at 6/21/2022 12:43 PM CDT -----  Contact: Alice Jenkins is needing a call back regarding the letter received stating she was needing to get on Cholesterol medication due to her lab results. Please call her back at [phone].      Please send it to:    [facility] [address]  [address] [place] [address]  [address]  [facility] [address]  Phone: [phone] Fax: [phone]

## 2022-08-23 ENCOUNTER — LAB VISIT (OUTPATIENT)
Dept: LAB | Facility: HOSPITAL | Age: 72
End: 2022-08-23
Attending: PHYSICIAN ASSISTANT
Payer: MEDICARE

## 2022-08-23 DIAGNOSIS — M85.89 OSTEOPENIA OF MULTIPLE SITES: ICD-10-CM

## 2022-08-23 DIAGNOSIS — Z51.81 MEDICATION MONITORING ENCOUNTER: ICD-10-CM

## 2022-08-23 DIAGNOSIS — I73.00 RAYNAUD'S PHENOMENON WITHOUT GANGRENE: ICD-10-CM

## 2022-08-23 DIAGNOSIS — D84.9 IMMUNOCOMPROMISED: ICD-10-CM

## 2022-08-23 DIAGNOSIS — M32.19 OTHER SYSTEMIC LUPUS ERYTHEMATOSUS WITH OTHER ORGAN INVOLVEMENT: ICD-10-CM

## 2022-08-23 DIAGNOSIS — M32.8 OTHER FORMS OF SYSTEMIC LUPUS ERYTHEMATOSUS, UNSPECIFIED ORGAN INVOLVEMENT STATUS: ICD-10-CM

## 2022-08-23 LAB
ALBUMIN SERPL BCP-MCNC: 3.7 G/DL (ref 3.5–5.2)
ALP SERPL-CCNC: 66 U/L (ref 55–135)
ALT SERPL W/O P-5'-P-CCNC: 9 U/L (ref 10–44)
ANION GAP SERPL CALC-SCNC: 6 MMOL/L (ref 8–16)
AST SERPL-CCNC: 17 U/L (ref 10–40)
BASOPHILS # BLD AUTO: 0.01 K/UL (ref 0–0.2)
BASOPHILS NFR BLD: 0.2 % (ref 0–1.9)
BILIRUB SERPL-MCNC: 1 MG/DL (ref 0.1–1)
BUN SERPL-MCNC: 11 MG/DL (ref 8–23)
C3 SERPL-MCNC: 141 MG/DL (ref 50–180)
C4 SERPL-MCNC: 33 MG/DL (ref 11–44)
CALCIUM SERPL-MCNC: 9 MG/DL (ref 8.7–10.5)
CHLORIDE SERPL-SCNC: 107 MMOL/L (ref 95–110)
CO2 SERPL-SCNC: 28 MMOL/L (ref 23–29)
CREAT SERPL-MCNC: 0.8 MG/DL (ref 0.5–1.4)
CREAT UR-MCNC: 95 MG/DL (ref 15–325)
CRP SERPL-MCNC: 4.6 MG/L (ref 0–8.2)
DIFFERENTIAL METHOD: ABNORMAL
EOSINOPHIL # BLD AUTO: 0.1 K/UL (ref 0–0.5)
EOSINOPHIL NFR BLD: 1.5 % (ref 0–8)
ERYTHROCYTE [DISTWIDTH] IN BLOOD BY AUTOMATED COUNT: 11.9 % (ref 11.5–14.5)
ERYTHROCYTE [SEDIMENTATION RATE] IN BLOOD BY WESTERGREN METHOD: 4 MM/HR (ref 0–20)
EST. GFR  (NO RACE VARIABLE): >60 ML/MIN/1.73 M^2
GLUCOSE SERPL-MCNC: 78 MG/DL (ref 70–110)
HCT VFR BLD AUTO: 42.9 % (ref 37–48.5)
HGB BLD-MCNC: 14.2 G/DL (ref 12–16)
IMM GRANULOCYTES # BLD AUTO: 0.01 K/UL (ref 0–0.04)
IMM GRANULOCYTES NFR BLD AUTO: 0.2 % (ref 0–0.5)
LYMPHOCYTES # BLD AUTO: 2.2 K/UL (ref 1–4.8)
LYMPHOCYTES NFR BLD: 39.7 % (ref 18–48)
MCH RBC QN AUTO: 31.5 PG (ref 27–31)
MCHC RBC AUTO-ENTMCNC: 33.1 G/DL (ref 32–36)
MCV RBC AUTO: 95 FL (ref 82–98)
MONOCYTES # BLD AUTO: 0.6 K/UL (ref 0.3–1)
MONOCYTES NFR BLD: 10.1 % (ref 4–15)
NEUTROPHILS # BLD AUTO: 2.7 K/UL (ref 1.8–7.7)
NEUTROPHILS NFR BLD: 48.5 % (ref 38–73)
NRBC BLD-RTO: 0 /100 WBC
PLATELET # BLD AUTO: 237 K/UL (ref 150–450)
PMV BLD AUTO: 8.4 FL (ref 9.2–12.9)
POTASSIUM SERPL-SCNC: 3.7 MMOL/L (ref 3.5–5.1)
PROT SERPL-MCNC: 8 G/DL (ref 6–8.4)
PROT UR-MCNC: 7 MG/DL (ref 0–15)
PROT/CREAT UR: 0.07 MG/G{CREAT} (ref 0–0.2)
RBC # BLD AUTO: 4.51 M/UL (ref 4–5.4)
SODIUM SERPL-SCNC: 141 MMOL/L (ref 136–145)
WBC # BLD AUTO: 5.47 K/UL (ref 3.9–12.7)

## 2022-08-23 PROCEDURE — 80053 COMPREHEN METABOLIC PANEL: CPT | Performed by: PHYSICIAN ASSISTANT

## 2022-08-23 PROCEDURE — 85651 RBC SED RATE NONAUTOMATED: CPT | Mod: PO | Performed by: PHYSICIAN ASSISTANT

## 2022-08-23 PROCEDURE — 86160 COMPLEMENT ANTIGEN: CPT | Mod: 59 | Performed by: PHYSICIAN ASSISTANT

## 2022-08-23 PROCEDURE — 86160 COMPLEMENT ANTIGEN: CPT | Performed by: PHYSICIAN ASSISTANT

## 2022-08-23 PROCEDURE — 85025 COMPLETE CBC W/AUTO DIFF WBC: CPT | Mod: PO | Performed by: PHYSICIAN ASSISTANT

## 2022-08-23 PROCEDURE — 84156 ASSAY OF PROTEIN URINE: CPT | Performed by: PHYSICIAN ASSISTANT

## 2022-08-23 PROCEDURE — 86140 C-REACTIVE PROTEIN: CPT | Performed by: PHYSICIAN ASSISTANT

## 2022-08-23 PROCEDURE — 36415 COLL VENOUS BLD VENIPUNCTURE: CPT | Mod: PO | Performed by: PHYSICIAN ASSISTANT

## 2022-08-30 ENCOUNTER — OFFICE VISIT (OUTPATIENT)
Dept: RHEUMATOLOGY | Facility: CLINIC | Age: 72
End: 2022-08-30
Payer: MEDICARE

## 2022-08-30 ENCOUNTER — TELEPHONE (OUTPATIENT)
Dept: RHEUMATOLOGY | Facility: CLINIC | Age: 72
End: 2022-08-30

## 2022-08-30 ENCOUNTER — LAB VISIT (OUTPATIENT)
Dept: LAB | Facility: HOSPITAL | Age: 72
End: 2022-08-30
Attending: FAMILY MEDICINE
Payer: MEDICARE

## 2022-08-30 VITALS
HEIGHT: 64 IN | HEART RATE: 69 BPM | WEIGHT: 140.63 LBS | DIASTOLIC BLOOD PRESSURE: 73 MMHG | BODY MASS INDEX: 24.01 KG/M2 | SYSTOLIC BLOOD PRESSURE: 146 MMHG

## 2022-08-30 DIAGNOSIS — M15.9 PRIMARY OSTEOARTHRITIS INVOLVING MULTIPLE JOINTS: ICD-10-CM

## 2022-08-30 DIAGNOSIS — M32.19 OTHER SYSTEMIC LUPUS ERYTHEMATOSUS WITH OTHER ORGAN INVOLVEMENT: Primary | ICD-10-CM

## 2022-08-30 DIAGNOSIS — Z79.899 OTHER LONG TERM (CURRENT) DRUG THERAPY: ICD-10-CM

## 2022-08-30 DIAGNOSIS — Z51.81 MEDICATION MONITORING ENCOUNTER: ICD-10-CM

## 2022-08-30 DIAGNOSIS — R31.29 MICROSCOPIC HEMATURIA: ICD-10-CM

## 2022-08-30 DIAGNOSIS — M54.12 RADICULAR SYNDROME OF UPPER LIMBS: ICD-10-CM

## 2022-08-30 DIAGNOSIS — M85.89 OSTEOPENIA OF MULTIPLE SITES: ICD-10-CM

## 2022-08-30 DIAGNOSIS — I73.00 RAYNAUD'S PHENOMENON WITHOUT GANGRENE: ICD-10-CM

## 2022-08-30 LAB
BILIRUB UR QL STRIP: NEGATIVE
CLARITY UR: CLEAR
COLOR UR: YELLOW
GLUCOSE UR QL STRIP: NEGATIVE
HGB UR QL STRIP: ABNORMAL
KETONES UR QL STRIP: NEGATIVE
LEUKOCYTE ESTERASE UR QL STRIP: NEGATIVE
MICROSCOPIC COMMENT: ABNORMAL
NITRITE UR QL STRIP: NEGATIVE
PH UR STRIP: 6 [PH] (ref 5–8)
PROT UR QL STRIP: NEGATIVE
RBC #/AREA URNS HPF: 20 /HPF (ref 0–4)
SP GR UR STRIP: 1.02 (ref 1–1.03)
URN SPEC COLLECT METH UR: ABNORMAL

## 2022-08-30 PROCEDURE — 99215 PR OFFICE/OUTPT VISIT, EST, LEVL V, 40-54 MIN: ICD-10-PCS | Mod: S$GLB,,, | Performed by: PHYSICIAN ASSISTANT

## 2022-08-30 PROCEDURE — 82306 VITAMIN D 25 HYDROXY: CPT | Performed by: PHYSICIAN ASSISTANT

## 2022-08-30 PROCEDURE — 3008F BODY MASS INDEX DOCD: CPT | Mod: CPTII,S$GLB,, | Performed by: PHYSICIAN ASSISTANT

## 2022-08-30 PROCEDURE — 3078F PR MOST RECENT DIASTOLIC BLOOD PRESSURE < 80 MM HG: ICD-10-PCS | Mod: CPTII,S$GLB,, | Performed by: PHYSICIAN ASSISTANT

## 2022-08-30 PROCEDURE — 3077F SYST BP >= 140 MM HG: CPT | Mod: CPTII,S$GLB,, | Performed by: PHYSICIAN ASSISTANT

## 2022-08-30 PROCEDURE — 3078F DIAST BP <80 MM HG: CPT | Mod: CPTII,S$GLB,, | Performed by: PHYSICIAN ASSISTANT

## 2022-08-30 PROCEDURE — 99999 PR PBB SHADOW E&M-EST. PATIENT-LVL IV: CPT | Mod: PBBFAC,,, | Performed by: PHYSICIAN ASSISTANT

## 2022-08-30 PROCEDURE — 1159F PR MEDICATION LIST DOCUMENTED IN MEDICAL RECORD: ICD-10-PCS | Mod: CPTII,S$GLB,, | Performed by: PHYSICIAN ASSISTANT

## 2022-08-30 PROCEDURE — 3288F PR FALLS RISK ASSESSMENT DOCUMENTED: ICD-10-PCS | Mod: CPTII,S$GLB,, | Performed by: PHYSICIAN ASSISTANT

## 2022-08-30 PROCEDURE — 3288F FALL RISK ASSESSMENT DOCD: CPT | Mod: CPTII,S$GLB,, | Performed by: PHYSICIAN ASSISTANT

## 2022-08-30 PROCEDURE — 99499 RISK ADDL DX/OHS AUDIT: ICD-10-PCS | Mod: S$GLB,,, | Performed by: PHYSICIAN ASSISTANT

## 2022-08-30 PROCEDURE — 1101F PR PT FALLS ASSESS DOC 0-1 FALLS W/OUT INJ PAST YR: ICD-10-PCS | Mod: CPTII,S$GLB,, | Performed by: PHYSICIAN ASSISTANT

## 2022-08-30 PROCEDURE — 1159F MED LIST DOCD IN RCRD: CPT | Mod: CPTII,S$GLB,, | Performed by: PHYSICIAN ASSISTANT

## 2022-08-30 PROCEDURE — 3008F PR BODY MASS INDEX (BMI) DOCUMENTED: ICD-10-PCS | Mod: CPTII,S$GLB,, | Performed by: PHYSICIAN ASSISTANT

## 2022-08-30 PROCEDURE — 99215 OFFICE O/P EST HI 40 MIN: CPT | Mod: S$GLB,,, | Performed by: PHYSICIAN ASSISTANT

## 2022-08-30 PROCEDURE — 3077F PR MOST RECENT SYSTOLIC BLOOD PRESSURE >= 140 MM HG: ICD-10-PCS | Mod: CPTII,S$GLB,, | Performed by: PHYSICIAN ASSISTANT

## 2022-08-30 PROCEDURE — 99999 PR PBB SHADOW E&M-EST. PATIENT-LVL IV: ICD-10-PCS | Mod: PBBFAC,,, | Performed by: PHYSICIAN ASSISTANT

## 2022-08-30 PROCEDURE — 1101F PT FALLS ASSESS-DOCD LE1/YR: CPT | Mod: CPTII,S$GLB,, | Performed by: PHYSICIAN ASSISTANT

## 2022-08-30 PROCEDURE — 36415 COLL VENOUS BLD VENIPUNCTURE: CPT | Performed by: PHYSICIAN ASSISTANT

## 2022-08-30 PROCEDURE — 81000 URINALYSIS NONAUTO W/SCOPE: CPT | Performed by: PHYSICIAN ASSISTANT

## 2022-08-30 PROCEDURE — 1125F AMNT PAIN NOTED PAIN PRSNT: CPT | Mod: CPTII,S$GLB,, | Performed by: PHYSICIAN ASSISTANT

## 2022-08-30 PROCEDURE — 1125F PR PAIN SEVERITY QUANTIFIED, PAIN PRESENT: ICD-10-PCS | Mod: CPTII,S$GLB,, | Performed by: PHYSICIAN ASSISTANT

## 2022-08-30 PROCEDURE — 99499 UNLISTED E&M SERVICE: CPT | Mod: S$GLB,,, | Performed by: PHYSICIAN ASSISTANT

## 2022-08-30 NOTE — TELEPHONE ENCOUNTER
Sched and confirmed urology appt with patient. Patient verbalized understanding of time date and location. Will mail appt letter

## 2022-08-30 NOTE — TELEPHONE ENCOUNTER
----- Message from Betsy Figueroa PA-C sent at 8/30/2022 12:15 PM CDT -----  Lets please confirm with her via phone about her urology appt since she is not on mychart

## 2022-08-30 NOTE — PROGRESS NOTES
RHEUMATOLOGY OUTPATIENT CLINIC NOTE    8/29/2022    Attending Rheumatologist: Betsy Figueroa PA-C  Primary Care Provider: Nic Cuellar MD   Chief Complaint/Reason For Consultation:  Lupus      Subjective:        Alice Loza is a 72 y.o. female here today for follow up SLE- in remission but has been significant in the past.  +Raynauds stable on asa and vit e.  She has AVN both shoulders, had seen Dr. Ruiz, was planning replacements but pain improved and surgery delayed. Improved pain in her hands and  has not worsened or improved.     Regarding her lupus, she is on plaquenil 3 times a week. Last several ANAs have been negative as has the dsdna, complements have been normal. utd with eye checks     She has a h/o osteopenia and is currently on bisphosphonate holiday (fosamax).  H/o low vitamin D and takes 50K units weekly, vit D normal range.  Dexa with stable bmd 2020     She denies any fevers, hair loss, sob. no joint swelling.  Has known djd and ddd in her lumbar spine.  Has carpal tunnel type symptoms R hand on and off.  Rheumatologic systems otherwise negative. Physical exam shows no rashes, no active raynauds/digital ulcers. No active synovitis.     Has not yet seen urology for eval of micro hematuria.    Serologies    Lab Results   Component Value Date    MILDRED Negative 06/04/2013     Lab Results   Component Value Date    HEPCAB Negative 01/10/2018      Current Rheum Medications:  HCQ, vit D  Previous Rheum Medications:   fosamax    Past Medical History:   Diagnosis Date    Avascular necrosis of hip     Left    Avascular necrosis of humeral head     Right    Cataract     Lumbar disc disease     Lupus     Osteoarthritis     Osteopenia     Osteoporosis     Raynaud phenomenon     Systemic lupus erythematosus     Tubular adenoma of colon 09/20/2016    Unspecified vitamin D deficiency      Social History     Socioeconomic History    Marital status:    Tobacco Use    Smoking status:  "Never    Smokeless tobacco: Never   Substance and Sexual Activity    Alcohol use: No    Drug use: No    Sexual activity: Not Currently     Review of patient's allergies indicates:   Allergen Reactions    No known drug allergies        Objective:   BP (!) 146/73   Pulse 69   Ht 5' 4" (1.626 m)   Wt 63.8 kg (140 lb 10.5 oz)   BMI 24.14 kg/m²     Immunization History   Administered Date(s) Administered    COVID-19, MRNA, LN-S, PF (MODERNA FULL 0.5 ML DOSE) 01/11/2021, 02/08/2021, 11/17/2021    Influenza - High Dose - PF (65 years and older) 10/13/2015, 10/25/2016, 02/04/2020    Influenza A (H1N1) 2009 Monovalent - IM 12/16/2009    Influenza Split 10/04/2013    Pneumococcal Conjugate - 13 Valent 10/15/2014    Pneumococcal Polysaccharide - 23 Valent 01/09/2002, 02/04/2020       Current Outpatient Medications:     cyclobenzaprine (FLEXERIL) 10 MG tablet, Take 1 tablet (10 mg total) by mouth every evening., Disp: 90 tablet, Rfl: 3    desonide (DESOWEN) 0.05 % cream, Apply topically 2 (two) times daily. for 10 days, Disp: 60 g, Rfl: 2    ergocalciferol (ERGOCALCIFEROL) 50,000 unit Cap, Take 1 capsule (50,000 Units total) by mouth every 7 days., Disp: 12 capsule, Rfl: 3    gabapentin (NEURONTIN) 300 MG capsule, Take 1 capsule (300 mg total) by mouth 3 (three) times daily., Disp: 90 capsule, Rfl: 11    hydrOXYchloroQUINE (PLAQUENIL) 200 mg tablet, Take one tablet by  Mouth three times a week, Disp: 36 tablet, Rfl: 3    meclizine (ANTIVERT) 12.5 mg tablet, Take 1 tablet (12.5 mg total) by mouth 2 (two) times daily as needed for Dizziness., Disp: 14 tablet, Rfl: 0    meloxicam (MOBIC) 15 MG tablet, Take 1 tablet (15 mg total) by mouth once daily. As needed for pain, Disp: 90 tablet, Rfl: 2    nystatin (MYCOSTATIN) powder, Apply topically Twice daily., Disp: 60 g, Rfl: 3    pravastatin (PRAVACHOL) 20 MG tablet, Take 1 tablet (20 mg total) by mouth once daily., Disp: 90 tablet, Rfl: 3    triamcinolone acetonide 0.1% (KENALOG) " 0.1 % cream, Apply topically 2 (two) times daily as needed. Thin film to area on face, Disp: 45 g, Rfl: 1    vitamin E 400 UNIT capsule, Take 1 capsule (400 Units total) by mouth once daily., Disp: , Rfl:        Recent Results (from the past 336 hour(s))   CBC Auto Differential    Collection Time: 08/23/22 10:49 AM   Result Value Ref Range    WBC 5.47 3.90 - 12.70 K/uL    RBC 4.51 4.00 - 5.40 M/uL    Hemoglobin 14.2 12.0 - 16.0 g/dL    Hematocrit 42.9 37.0 - 48.5 %    MCV 95 82 - 98 fL    MCH 31.5 (H) 27.0 - 31.0 pg    MCHC 33.1 32.0 - 36.0 g/dL    RDW 11.9 11.5 - 14.5 %    Platelets 237 150 - 450 K/uL    MPV 8.4 (L) 9.2 - 12.9 fL    Immature Granulocytes 0.2 0.0 - 0.5 %    Gran # (ANC) 2.7 1.8 - 7.7 K/uL    Immature Grans (Abs) 0.01 0.00 - 0.04 K/uL    Lymph # 2.2 1.0 - 4.8 K/uL    Mono # 0.6 0.3 - 1.0 K/uL    Eos # 0.1 0.0 - 0.5 K/uL    Baso # 0.01 0.00 - 0.20 K/uL    nRBC 0 0 /100 WBC    Gran % 48.5 38.0 - 73.0 %    Lymph % 39.7 18.0 - 48.0 %    Mono % 10.1 4.0 - 15.0 %    Eosinophil % 1.5 0.0 - 8.0 %    Basophil % 0.2 0.0 - 1.9 %    Differential Method Automated    Comprehensive Metabolic Panel    Collection Time: 08/23/22 10:49 AM   Result Value Ref Range    Sodium 141 136 - 145 mmol/L    Potassium 3.7 3.5 - 5.1 mmol/L    Chloride 107 95 - 110 mmol/L    CO2 28 23 - 29 mmol/L    Glucose 78 70 - 110 mg/dL    BUN 11 8 - 23 mg/dL    Creatinine 0.8 0.5 - 1.4 mg/dL    Calcium 9.0 8.7 - 10.5 mg/dL    Total Protein 8.0 6.0 - 8.4 g/dL    Albumin 3.7 3.5 - 5.2 g/dL    Total Bilirubin 1.0 0.1 - 1.0 mg/dL    Alkaline Phosphatase 66 55 - 135 U/L    AST 17 10 - 40 U/L    ALT 9 (L) 10 - 44 U/L    Anion Gap 6 (L) 8 - 16 mmol/L    eGFR >60.0 >60 mL/min/1.73 m^2   C-Reactive Protein    Collection Time: 08/23/22 10:49 AM   Result Value Ref Range    CRP 4.6 0.0 - 8.2 mg/L   Sedimentation rate    Collection Time: 08/23/22 10:49 AM   Result Value Ref Range    Sed Rate 4 0 - 20 mm/Hr   C4 Complement    Collection Time: 08/23/22 10:49  AM   Result Value Ref Range    Complement (C-4) 33 11 - 44 mg/dL   C3 COMPLEMENT    Collection Time: 08/23/22 10:49 AM   Result Value Ref Range    Complement (C-3) 141 50 - 180 mg/dL   Protein / creatinine ratio, urine    Collection Time: 08/23/22 10:59 AM   Result Value Ref Range    Protein, Urine Random 7 0 - 15 mg/dL    Creatinine, Urine 95.0 15.0 - 325.0 mg/dL    Prot/Creat Ratio, Urine 0.07 0.00 - 0.20       Imaging:  All imaging reviewed and independently interpreted by me.    XR CERVICAL SPINE 5 VIEW WITH FLEX AND EXT 4/29/22  FINDINGS:  Vertebral body heights maintained.  No spondylolisthesis.  Similar degenerative disc height loss and osteophyte changes noted at C4-5 and C5-6.  Minimal anterolisthesis of C3 on C4 in flexion with alignment unchanged in extension compared to neutral.  No high-grade neural foraminal narrowing.  Multilevel facet arthropathy.  Lung apices clear.  Prevertebral soft tissues unremarkable.    Assessment:     1. Other systemic lupus erythematosus with other organ involvement    2. Microscopic hematuria    3. Osteopenia of multiple sites    4. Other long term (current) drug therapy    5. Medication monitoring encounter    6. Raynaud's phenomenon without gangrene    7. Primary osteoarthritis involving multiple joints    8. Radicular syndrome of upper limbs          Plan:       Refer to urology for eval micro hematuria- follow up on referral. Update u/a today as well.   Lupus w/o signs of activity at this time. lab results reviewed and discussed findings with the patient. Cont HCQ as prescribed  Continue to monitor radicular sx of bilateral UE at this time  Update DEXA scan at this time  Return to clinic: 6 mos w/ sle labs    The patient understands, chooses and consents to this plan and accepts all the risks which include but are not limited to the risks mentioned above.     Method of contact with patient concerns: Juice attn Rheumatology    60 minutes of total time spent on the  encounter, which includes face to face time and non-face to face time preparing to see the patient (eg, review of tests), Obtaining and/or reviewing separately obtained history, Documenting clinical information in the electronic or other health record, Independently interpreting results (not separately reported) and communicating results to the patient/family/caregiver, or Care coordination (not separately reported).          Disclaimer: This note was prepared using a voice recognition system and is likely to have sound alike errors within the text.       Betsy Figueroa PA-C  Rheumatology Department   [facility] [place]

## 2022-08-31 LAB — 25(OH)D3+25(OH)D2 SERPL-MCNC: 57 NG/ML (ref 30–96)

## 2022-09-08 ENCOUNTER — TELEPHONE (OUTPATIENT)
Dept: RHEUMATOLOGY | Facility: CLINIC | Age: 72
End: 2022-09-08
Payer: MEDICARE

## 2022-09-08 DIAGNOSIS — M32.19 OTHER SYSTEMIC LUPUS ERYTHEMATOSUS WITH OTHER ORGAN INVOLVEMENT: Primary | ICD-10-CM

## 2022-09-08 NOTE — TELEPHONE ENCOUNTER
----- Message from Silvia Castillo MA sent at 9/8/2022  1:39 PM CDT -----  Emma was calling to speak to someone with Betsy Figueroa 's office.  Please call back at 1 [phone]

## 2022-09-14 ENCOUNTER — PES CALL (OUTPATIENT)
Dept: ADMINISTRATIVE | Facility: CLINIC | Age: 72
End: 2022-09-14
Payer: MEDICARE

## 2022-09-20 ENCOUNTER — TELEPHONE (OUTPATIENT)
Dept: RHEUMATOLOGY | Facility: CLINIC | Age: 72
End: 2022-09-20
Payer: MEDICARE

## 2022-09-20 ENCOUNTER — HOSPITAL ENCOUNTER (OUTPATIENT)
Dept: RADIOLOGY | Facility: HOSPITAL | Age: 72
Discharge: HOME | End: 2022-09-20
Attending: PHYSICIAN ASSISTANT
Payer: MEDICARE

## 2022-09-20 DIAGNOSIS — M85.89 OSTEOPENIA OF MULTIPLE SITES: ICD-10-CM

## 2022-09-20 PROCEDURE — 77080 DXA BONE DENSITY AXIAL: CPT | Mod: 26,,, | Performed by: RADIOLOGY

## 2022-09-20 PROCEDURE — 77080 DEXA BONE DENSITY SPINE HIP: ICD-10-PCS | Mod: 26,,, | Performed by: RADIOLOGY

## 2022-09-20 PROCEDURE — 77080 DXA BONE DENSITY AXIAL: CPT | Mod: TC,PO

## 2022-09-20 NOTE — TELEPHONE ENCOUNTER
1st attempt to contact patient in regards to sched appt to discuss results. Pt did not answer. Unable to LVM

## 2022-09-20 NOTE — TELEPHONE ENCOUNTER
----- Message from Betsy Figueroa PA-C sent at 9/20/2022 11:24 AM CDT -----  Please make appt to come in and discuss abnormal bone density results

## 2022-09-27 ENCOUNTER — OFFICE VISIT (OUTPATIENT)
Dept: UROLOGY | Facility: CLINIC | Age: 72
End: 2022-09-27
Payer: MEDICARE

## 2022-09-27 ENCOUNTER — OFFICE VISIT (OUTPATIENT)
Dept: RHEUMATOLOGY | Facility: CLINIC | Age: 72
End: 2022-09-27
Payer: MEDICARE

## 2022-09-27 VITALS
WEIGHT: 137.81 LBS | HEART RATE: 75 BPM | SYSTOLIC BLOOD PRESSURE: 160 MMHG | HEIGHT: 64 IN | BODY MASS INDEX: 23.53 KG/M2 | DIASTOLIC BLOOD PRESSURE: 82 MMHG

## 2022-09-27 VITALS
WEIGHT: 138 LBS | HEIGHT: 64 IN | HEART RATE: 73 BPM | SYSTOLIC BLOOD PRESSURE: 172 MMHG | BODY MASS INDEX: 23.56 KG/M2 | RESPIRATION RATE: 18 BRPM | DIASTOLIC BLOOD PRESSURE: 82 MMHG

## 2022-09-27 DIAGNOSIS — M85.80 OSTEOPENIA WITH HIGH RISK OF FRACTURE: Primary | ICD-10-CM

## 2022-09-27 DIAGNOSIS — M85.89 OSTEOPENIA OF MULTIPLE SITES: ICD-10-CM

## 2022-09-27 DIAGNOSIS — R31.29 MICROSCOPIC HEMATURIA: ICD-10-CM

## 2022-09-27 DIAGNOSIS — E55.9 VITAMIN D DEFICIENCY: ICD-10-CM

## 2022-09-27 DIAGNOSIS — I73.00 RAYNAUD'S PHENOMENON WITHOUT GANGRENE: ICD-10-CM

## 2022-09-27 DIAGNOSIS — Z51.81 MEDICATION MONITORING ENCOUNTER: ICD-10-CM

## 2022-09-27 DIAGNOSIS — M32.19 OTHER SYSTEMIC LUPUS ERYTHEMATOSUS WITH OTHER ORGAN INVOLVEMENT: ICD-10-CM

## 2022-09-27 DIAGNOSIS — M15.9 PRIMARY OSTEOARTHRITIS INVOLVING MULTIPLE JOINTS: ICD-10-CM

## 2022-09-27 PROCEDURE — 1101F PR PT FALLS ASSESS DOC 0-1 FALLS W/OUT INJ PAST YR: ICD-10-PCS | Mod: CPTII,S$GLB,, | Performed by: PHYSICIAN ASSISTANT

## 2022-09-27 PROCEDURE — 99999 PR PBB SHADOW E&M-EST. PATIENT-LVL IV: CPT | Mod: PBBFAC,,, | Performed by: UROLOGY

## 2022-09-27 PROCEDURE — 3008F PR BODY MASS INDEX (BMI) DOCUMENTED: ICD-10-PCS | Mod: CPTII,S$GLB,, | Performed by: PHYSICIAN ASSISTANT

## 2022-09-27 PROCEDURE — 99999 PR PBB SHADOW E&M-EST. PATIENT-LVL III: ICD-10-PCS | Mod: PBBFAC,,, | Performed by: PHYSICIAN ASSISTANT

## 2022-09-27 PROCEDURE — 3077F SYST BP >= 140 MM HG: CPT | Mod: CPTII,S$GLB,, | Performed by: UROLOGY

## 2022-09-27 PROCEDURE — 1160F RVW MEDS BY RX/DR IN RCRD: CPT | Mod: CPTII,S$GLB,, | Performed by: UROLOGY

## 2022-09-27 PROCEDURE — 1159F MED LIST DOCD IN RCRD: CPT | Mod: CPTII,S$GLB,, | Performed by: UROLOGY

## 2022-09-27 PROCEDURE — 3008F BODY MASS INDEX DOCD: CPT | Mod: CPTII,S$GLB,, | Performed by: UROLOGY

## 2022-09-27 PROCEDURE — 1126F AMNT PAIN NOTED NONE PRSNT: CPT | Mod: CPTII,S$GLB,, | Performed by: PHYSICIAN ASSISTANT

## 2022-09-27 PROCEDURE — 3288F PR FALLS RISK ASSESSMENT DOCUMENTED: ICD-10-PCS | Mod: CPTII,S$GLB,, | Performed by: UROLOGY

## 2022-09-27 PROCEDURE — 3079F DIAST BP 80-89 MM HG: CPT | Mod: CPTII,S$GLB,, | Performed by: PHYSICIAN ASSISTANT

## 2022-09-27 PROCEDURE — 99204 OFFICE O/P NEW MOD 45 MIN: CPT | Mod: S$GLB,,, | Performed by: UROLOGY

## 2022-09-27 PROCEDURE — 1126F PR PAIN SEVERITY QUANTIFIED, NO PAIN PRESENT: ICD-10-PCS | Mod: CPTII,S$GLB,, | Performed by: PHYSICIAN ASSISTANT

## 2022-09-27 PROCEDURE — 1101F PT FALLS ASSESS-DOCD LE1/YR: CPT | Mod: CPTII,S$GLB,, | Performed by: UROLOGY

## 2022-09-27 PROCEDURE — 99215 PR OFFICE/OUTPT VISIT, EST, LEVL V, 40-54 MIN: ICD-10-PCS | Mod: S$GLB,,, | Performed by: PHYSICIAN ASSISTANT

## 2022-09-27 PROCEDURE — 3077F PR MOST RECENT SYSTOLIC BLOOD PRESSURE >= 140 MM HG: ICD-10-PCS | Mod: CPTII,S$GLB,, | Performed by: PHYSICIAN ASSISTANT

## 2022-09-27 PROCEDURE — 1126F PR PAIN SEVERITY QUANTIFIED, NO PAIN PRESENT: ICD-10-PCS | Mod: CPTII,S$GLB,, | Performed by: UROLOGY

## 2022-09-27 PROCEDURE — 3079F DIAST BP 80-89 MM HG: CPT | Mod: CPTII,S$GLB,, | Performed by: UROLOGY

## 2022-09-27 PROCEDURE — 1101F PT FALLS ASSESS-DOCD LE1/YR: CPT | Mod: CPTII,S$GLB,, | Performed by: PHYSICIAN ASSISTANT

## 2022-09-27 PROCEDURE — 1101F PR PT FALLS ASSESS DOC 0-1 FALLS W/OUT INJ PAST YR: ICD-10-PCS | Mod: CPTII,S$GLB,, | Performed by: UROLOGY

## 2022-09-27 PROCEDURE — 3008F PR BODY MASS INDEX (BMI) DOCUMENTED: ICD-10-PCS | Mod: CPTII,S$GLB,, | Performed by: UROLOGY

## 2022-09-27 PROCEDURE — 3288F FALL RISK ASSESSMENT DOCD: CPT | Mod: CPTII,S$GLB,, | Performed by: PHYSICIAN ASSISTANT

## 2022-09-27 PROCEDURE — 3077F PR MOST RECENT SYSTOLIC BLOOD PRESSURE >= 140 MM HG: ICD-10-PCS | Mod: CPTII,S$GLB,, | Performed by: UROLOGY

## 2022-09-27 PROCEDURE — 99499 RISK ADDL DX/OHS AUDIT: ICD-10-PCS | Mod: S$GLB,,, | Performed by: PHYSICIAN ASSISTANT

## 2022-09-27 PROCEDURE — 1160F PR REVIEW ALL MEDS BY PRESCRIBER/CLIN PHARMACIST DOCUMENTED: ICD-10-PCS | Mod: CPTII,S$GLB,, | Performed by: UROLOGY

## 2022-09-27 PROCEDURE — 3079F PR MOST RECENT DIASTOLIC BLOOD PRESSURE 80-89 MM HG: ICD-10-PCS | Mod: CPTII,S$GLB,, | Performed by: PHYSICIAN ASSISTANT

## 2022-09-27 PROCEDURE — 3288F FALL RISK ASSESSMENT DOCD: CPT | Mod: CPTII,S$GLB,, | Performed by: UROLOGY

## 2022-09-27 PROCEDURE — 3079F PR MOST RECENT DIASTOLIC BLOOD PRESSURE 80-89 MM HG: ICD-10-PCS | Mod: CPTII,S$GLB,, | Performed by: UROLOGY

## 2022-09-27 PROCEDURE — 3008F BODY MASS INDEX DOCD: CPT | Mod: CPTII,S$GLB,, | Performed by: PHYSICIAN ASSISTANT

## 2022-09-27 PROCEDURE — 99999 PR PBB SHADOW E&M-EST. PATIENT-LVL IV: ICD-10-PCS | Mod: PBBFAC,,, | Performed by: UROLOGY

## 2022-09-27 PROCEDURE — 1159F PR MEDICATION LIST DOCUMENTED IN MEDICAL RECORD: ICD-10-PCS | Mod: CPTII,S$GLB,, | Performed by: UROLOGY

## 2022-09-27 PROCEDURE — 99999 PR PBB SHADOW E&M-EST. PATIENT-LVL III: CPT | Mod: PBBFAC,,, | Performed by: PHYSICIAN ASSISTANT

## 2022-09-27 PROCEDURE — 99215 OFFICE O/P EST HI 40 MIN: CPT | Mod: S$GLB,,, | Performed by: PHYSICIAN ASSISTANT

## 2022-09-27 PROCEDURE — 3288F PR FALLS RISK ASSESSMENT DOCUMENTED: ICD-10-PCS | Mod: CPTII,S$GLB,, | Performed by: PHYSICIAN ASSISTANT

## 2022-09-27 PROCEDURE — 99499 UNLISTED E&M SERVICE: CPT | Mod: S$GLB,,, | Performed by: PHYSICIAN ASSISTANT

## 2022-09-27 PROCEDURE — 99204 PR OFFICE/OUTPT VISIT, NEW, LEVL IV, 45-59 MIN: ICD-10-PCS | Mod: S$GLB,,, | Performed by: UROLOGY

## 2022-09-27 PROCEDURE — 3077F SYST BP >= 140 MM HG: CPT | Mod: CPTII,S$GLB,, | Performed by: PHYSICIAN ASSISTANT

## 2022-09-27 PROCEDURE — 1126F AMNT PAIN NOTED NONE PRSNT: CPT | Mod: CPTII,S$GLB,, | Performed by: UROLOGY

## 2022-09-27 NOTE — PROGRESS NOTES
RHEUMATOLOGY OUTPATIENT CLINIC NOTE    9/27/2022    Attending Rheumatologist: Betsy Figueroa PA-C  Primary Care Provider: Nic Cuellar MD   Chief Complaint/Reason For Consultation:  Lupus      Subjective:        Alice Loza is a 72 y.o. female here today for follow up recent DEXA scan results- osteopenia w/ high FRAX. She has been on drug holiday since approx 2016. Used fosamax in the past. Had initial appt w/ urology today for micro hematuria.    SLE- in remission but has been significant in the past.  +Raynauds stable on asa and vit e.  She has AVN both shoulders, had seen Dr. Ruiz, was planning replacements but pain improved and surgery delayed. Improved pain in her hands and  has not worsened or improved.     Regarding her lupus, she is on plaquenil 3 times a week. Last several ANAs have been negative as has the dsdna, complements have been normal. utd with eye checks     She denies any fevers, hair loss, sob. no joint swelling.  Has known djd and ddd in her lumbar spine.  Has carpal tunnel type symptoms R hand on and off.  Rheumatologic systems otherwise negative. Physical exam shows no rashes, no active raynauds/digital ulcers. No active synovitis.       Serologies    Lab Results   Component Value Date    MILDRED Negative 06/04/2013     Lab Results   Component Value Date    HEPCAB Negative 01/10/2018      Current Rheum Medications:  HCQ, vit D  Previous Rheum Medications:   fosamax    Past Medical History:   Diagnosis Date    Avascular necrosis of hip     Left    Avascular necrosis of humeral head     Right    Cataract     Lumbar disc disease     Lupus     Osteoarthritis     Osteopenia     Osteoporosis     Raynaud phenomenon     Systemic lupus erythematosus     Tubular adenoma of colon 09/20/2016    Unspecified vitamin D deficiency      Social History     Socioeconomic History    Marital status:    Tobacco Use    Smoking status: Never    Smokeless tobacco: Never   Substance and  "Sexual Activity    Alcohol use: No    Drug use: No    Sexual activity: Not Currently     Review of patient's allergies indicates:   Allergen Reactions    No known drug allergies        Objective:   BP (!) 160/82   Pulse 75   Ht 5' 4" (1.626 m)   Wt 62.5 kg (137 lb 12.6 oz)   BMI 23.65 kg/m²     Immunization History   Administered Date(s) Administered    COVID-19, MRNA, LN-S, PF (MODERNA FULL 0.5 ML DOSE) 01/11/2021, 02/08/2021, 11/17/2021    Influenza - High Dose - PF (65 years and older) 10/13/2015, 10/25/2016, 02/04/2020    Influenza A (H1N1) 2009 Monovalent - IM 12/16/2009    Influenza Split 10/04/2013    Pneumococcal Conjugate - 13 Valent 10/15/2014    Pneumococcal Polysaccharide - 23 Valent 01/09/2002, 02/04/2020       Current Outpatient Medications:     cyclobenzaprine (FLEXERIL) 10 MG tablet, Take 1 tablet (10 mg total) by mouth every evening., Disp: 90 tablet, Rfl: 3    ergocalciferol (ERGOCALCIFEROL) 50,000 unit Cap, Take 1 capsule (50,000 Units total) by mouth every 7 days., Disp: 12 capsule, Rfl: 3    hydrOXYchloroQUINE (PLAQUENIL) 200 mg tablet, Take one tablet by  Mouth three times a week, Disp: 36 tablet, Rfl: 3    meclizine (ANTIVERT) 12.5 mg tablet, Take 1 tablet (12.5 mg total) by mouth 2 (two) times daily as needed for Dizziness., Disp: 14 tablet, Rfl: 0    meloxicam (MOBIC) 15 MG tablet, Take 1 tablet (15 mg total) by mouth once daily. As needed for pain, Disp: 90 tablet, Rfl: 2    nystatin (MYCOSTATIN) powder, Apply topically Twice daily., Disp: 60 g, Rfl: 3    pravastatin (PRAVACHOL) 20 MG tablet, Take 1 tablet (20 mg total) by mouth once daily., Disp: 90 tablet, Rfl: 3    vitamin E 400 UNIT capsule, Take 1 capsule (400 Units total) by mouth once daily., Disp: , Rfl:     desonide (DESOWEN) 0.05 % cream, Apply topically 2 (two) times daily. for 10 days, Disp: 60 g, Rfl: 2    gabapentin (NEURONTIN) 300 MG capsule, Take 1 capsule (300 mg total) by mouth 3 (three) times daily., Disp: 90 capsule, " Rfl: 11    triamcinolone acetonide 0.1% (KENALOG) 0.1 % cream, Apply topically 2 (two) times daily as needed. Thin film to area on face, Disp: 45 g, Rfl: 1       Recent Results (from the past 336 hour(s))   Lipid Panel    Collection Time: 09/20/22  9:07 AM   Result Value Ref Range    Cholesterol 187 120 - 199 mg/dL    Triglycerides 66 30 - 150 mg/dL    HDL 66 40 - 75 mg/dL    LDL Cholesterol 107.8 63.0 - 159.0 mg/dL    HDL/Cholesterol Ratio 35.3 20.0 - 50.0 %    Total Cholesterol/HDL Ratio 2.8 2.0 - 5.0    Non-HDL Cholesterol 121 mg/dL   ALT (SGPT)    Collection Time: 09/20/22  9:07 AM   Result Value Ref Range    ALT 9 (L) 10 - 44 U/L       Imaging:  All imaging reviewed and independently interpreted by me.    XR CERVICAL SPINE 5 VIEW WITH FLEX AND EXT 4/29/22  FINDINGS:  Vertebral body heights maintained.  No spondylolisthesis.  Similar degenerative disc height loss and osteophyte changes noted at C4-5 and C5-6.  Minimal anterolisthesis of C3 on C4 in flexion with alignment unchanged in extension compared to neutral.  No high-grade neural foraminal narrowing.  Multilevel facet arthropathy.  Lung apices clear.  Prevertebral soft tissues unremarkable.    DEXA BONE DENSITY SPINE HIP 9/20/22  COMPARISON:  07/25/2018     FINDINGS:  The L1 to L4 vertebral bone mineral density is equal to 1.215 g/cm squared with a T score of 0.3.  There has been no significant change relative to the prior study.     The left femoral neck bone mineral density is equal to 0.731 g/cm squared with a T score of -2.2.  There has been  no significant change relative to the prior study.     There is a 12.7% risk of a major osteoporotic fracture and a 3.3% risk of hip fracture in the next 10 years (FRAX).     Impression: Osteopenia    Assessment:     1. Osteopenia with high risk of fracture    2. Osteopenia of multiple sites    3. Other systemic lupus erythematosus with other organ involvement    4. Raynaud's phenomenon without gangrene    5.  Medication monitoring encounter    6. Primary osteoarthritis involving multiple joints    7. Vitamin D deficiency            Plan:       Plan to resume therapy for osteopenia w/ high FRAX w/ prolia   Lupus w/o signs of activity at this time. lab results reviewed and discussed findings with the patient. Cont HCQ as prescribed  Continue to monitor radicular sx of bilateral UE at this time  Update DEXA scan at this time  Return to clinic: 6 mos w/ sle labs; schedule prolia for 10/14/22    The patient understands, chooses and consents to this plan and accepts all the risks which include but are not limited to the risks mentioned above.     Method of contact with patient concerns: Juice pierce Rheumatology    60 minutes of total time spent on the encounter, which includes face to face time and non-face to face time preparing to see the patient (eg, review of tests), Obtaining and/or reviewing separately obtained history, Documenting clinical information in the electronic or other health record, Independently interpreting results (not separately reported) and communicating results to the patient/family/caregiver, or Care coordination (not separately reported).          Disclaimer: This note was prepared using a voice recognition system and is likely to have sound alike errors within the text.       Betsy Figueroa PA-C  Rheumatology Department   [facility] [place]

## 2022-09-27 NOTE — PROGRESS NOTES
Chief Complaint:  Microscopic hematuria    HPI:   Alice Loza is a 72 y.o. female that presents today as a referral from OLAMIDE Figueroa for microscopic hematuria.  Patient had routine labs obtained which showed 20 red blood cells per high-power field.  She denies any gross hematuria.  She is a nonsmoker, but does have lupus.  She notes some urgency not bothersome to her.  She denies family history of  cancers or prior urologic procedures.      PMH:  Past Medical History:   Diagnosis Date    Avascular necrosis of hip     Left    Avascular necrosis of humeral head     Right    Cataract     Lumbar disc disease     Lupus     Osteoarthritis     Osteopenia     Osteoporosis     Raynaud phenomenon     Systemic lupus erythematosus     Tubular adenoma of colon 09/20/2016    Unspecified vitamin D deficiency        PSH:  Past Surgical History:   Procedure Laterality Date    CHOLECYSTECTOMY      COLONOSCOPY N/A 9/20/2016    Procedure: COLONOSCOPY;  Surgeon: Aroldo Raphael MD;  Location: [facility];  Service: Endoscopy;  Laterality: N/A;    TOTAL HIP ARTHROPLASTY      Left       Family History:  Family History   Problem Relation Age of Onset    Cervical cancer Mother     Heart attack Father 62    Breast cancer Sister        Social History:  Social History     Tobacco Use    Smoking status: Never    Smokeless tobacco: Never   Substance Use Topics    Alcohol use: No    Drug use: No        Review of Systems:  General: No fever, chills  Skin: No rashes  Chest:  Denies cough and sputum production  Heart: Denies chest pain  Resp: Denies dyspnea  Abdomen: Denies diarrhea, abdominal pain, hematemesis, or blood in stool.  Musculoskeletal: No joint stiffness or swelling. Denies back pain.  : see HPI  Neuro: no dizziness or weakness    Allergies:  No known drug allergies    Medications:    Current Outpatient Medications:     cyclobenzaprine (FLEXERIL) 10 MG tablet, Take 1 tablet (10 mg total) by mouth every evening., Disp: 90 tablet,  Rfl: 3    ergocalciferol (ERGOCALCIFEROL) 50,000 unit Cap, Take 1 capsule (50,000 Units total) by mouth every 7 days., Disp: 12 capsule, Rfl: 3    hydrOXYchloroQUINE (PLAQUENIL) 200 mg tablet, Take one tablet by  Mouth three times a week, Disp: 36 tablet, Rfl: 3    meclizine (ANTIVERT) 12.5 mg tablet, Take 1 tablet (12.5 mg total) by mouth 2 (two) times daily as needed for Dizziness., Disp: 14 tablet, Rfl: 0    meloxicam (MOBIC) 15 MG tablet, Take 1 tablet (15 mg total) by mouth once daily. As needed for pain, Disp: 90 tablet, Rfl: 2    nystatin (MYCOSTATIN) powder, Apply topically Twice daily., Disp: 60 g, Rfl: 3    pravastatin (PRAVACHOL) 20 MG tablet, Take 1 tablet (20 mg total) by mouth once daily., Disp: 90 tablet, Rfl: 3    vitamin E 400 UNIT capsule, Take 1 capsule (400 Units total) by mouth once daily., Disp: , Rfl:     desonide (DESOWEN) 0.05 % cream, Apply topically 2 (two) times daily. for 10 days, Disp: 60 g, Rfl: 2    gabapentin (NEURONTIN) 300 MG capsule, Take 1 capsule (300 mg total) by mouth 3 (three) times daily., Disp: 90 capsule, Rfl: 11    triamcinolone acetonide 0.1% (KENALOG) 0.1 % cream, Apply topically 2 (two) times daily as needed. Thin film to area on face, Disp: 45 g, Rfl: 1    Physical Exam:  Vitals:    09/27/22 1039   BP: (!) 172/82   Pulse: 73   Resp: 18     Body mass index is 23.69 kg/m².  General: awake, alert, cooperative  Head: NC/AT  Ears: external ears normal  Eyes: sclera normal  Lungs: normal inspiration, NAD  Heart: well-perfused  Skin: The skin is warm and dry  Ext: No c/c/e.  Neuro: grossly intact, AOx3    RADIOLOGY:  No recent relevant imaging available for review.    LABS:  I personally reviewed the following lab values:  Lab Results   Component Value Date    WBC 5.47 08/23/2022    HGB 14.2 08/23/2022    HCT 42.9 08/23/2022     08/23/2022     08/23/2022    K 3.7 08/23/2022     08/23/2022    CREATININE 0.8 08/23/2022    BUN 11 08/23/2022    CO2 28  08/23/2022    TSH 1.352 04/06/2021    CHOL 187 09/20/2022    TRIG 66 09/20/2022    HDL 66 09/20/2022    ALT 9 (L) 09/20/2022    AST 17 08/23/2022       Assessment/Plan:   Alice Loza is a 72 y.o. female with microscopic hematuria. I reviewed the etiology of microscopic hematuria including benign conditions such as bladder and kidney stones, bladder and kidney infections, renal cysts, and urethral stricture disease, but also includes malignant conditions like  bladder cancer and renal cell carcinoma.  I reviewed the workup for microscopic hematuria including a cystoscopy and upper tract imaging, which includes a renal ultrasound or CT urogram, in properly selected patients.  Obtain renal ultrasound, follow-up for office cystoscopy.    Thank you for allowing me the opportunity to participate in this patient's care.     Eduardo Meneses MD  Urology

## 2022-10-07 ENCOUNTER — LAB VISIT (OUTPATIENT)
Dept: LAB | Facility: HOSPITAL | Age: 72
End: 2022-10-07
Attending: PHYSICIAN ASSISTANT
Payer: MEDICARE

## 2022-10-07 DIAGNOSIS — I73.00 RAYNAUD'S PHENOMENON WITHOUT GANGRENE: ICD-10-CM

## 2022-10-07 DIAGNOSIS — Z51.81 MEDICATION MONITORING ENCOUNTER: ICD-10-CM

## 2022-10-07 DIAGNOSIS — M85.89 OSTEOPENIA OF MULTIPLE SITES: ICD-10-CM

## 2022-10-07 DIAGNOSIS — M32.8 OTHER FORMS OF SYSTEMIC LUPUS ERYTHEMATOSUS, UNSPECIFIED ORGAN INVOLVEMENT STATUS: ICD-10-CM

## 2022-10-07 DIAGNOSIS — D84.9 IMMUNOCOMPROMISED: ICD-10-CM

## 2022-10-07 PROCEDURE — 36415 COLL VENOUS BLD VENIPUNCTURE: CPT | Mod: PO | Performed by: PHYSICIAN ASSISTANT

## 2022-10-07 PROCEDURE — 80053 COMPREHEN METABOLIC PANEL: CPT | Performed by: PHYSICIAN ASSISTANT

## 2022-10-08 LAB
ALBUMIN SERPL BCP-MCNC: 3.8 G/DL (ref 3.5–5.2)
ALP SERPL-CCNC: 56 U/L (ref 55–135)
ALT SERPL W/O P-5'-P-CCNC: 7 U/L (ref 10–44)
ANION GAP SERPL CALC-SCNC: 11 MMOL/L (ref 8–16)
AST SERPL-CCNC: 15 U/L (ref 10–40)
BILIRUB SERPL-MCNC: 0.6 MG/DL (ref 0.1–1)
BUN SERPL-MCNC: 11 MG/DL (ref 8–23)
CALCIUM SERPL-MCNC: 9.2 MG/DL (ref 8.7–10.5)
CHLORIDE SERPL-SCNC: 107 MMOL/L (ref 95–110)
CO2 SERPL-SCNC: 23 MMOL/L (ref 23–29)
CREAT SERPL-MCNC: 0.8 MG/DL (ref 0.5–1.4)
EST. GFR  (NO RACE VARIABLE): >60 ML/MIN/1.73 M^2
GLUCOSE SERPL-MCNC: 60 MG/DL (ref 70–110)
POTASSIUM SERPL-SCNC: 3.8 MMOL/L (ref 3.5–5.1)
PROT SERPL-MCNC: 7.1 G/DL (ref 6–8.4)
SODIUM SERPL-SCNC: 141 MMOL/L (ref 136–145)

## 2022-10-11 ENCOUNTER — TELEPHONE (OUTPATIENT)
Dept: RHEUMATOLOGY | Facility: CLINIC | Age: 72
End: 2022-10-11
Payer: MEDICARE

## 2022-10-11 NOTE — TELEPHONE ENCOUNTER
----- Message from Vic Aguirre sent at 10/11/2022  1:36 PM CDT -----  Contact: Alicelorna Solo is calling to speak with a nurse regarding appt. Reports needing to discuss appt information and adjust changes. Please give pt a call at [phone].

## 2022-10-11 NOTE — TELEPHONE ENCOUNTER
Confirmed patient appointments for 10/14/2022. Patient verbalized understanding and was grateful for the call_DD

## 2022-10-14 ENCOUNTER — INFUSION (OUTPATIENT)
Dept: INFUSION THERAPY | Facility: HOSPITAL | Age: 72
End: 2022-10-14
Attending: UROLOGY
Payer: MEDICARE

## 2022-10-14 ENCOUNTER — HOSPITAL ENCOUNTER (OUTPATIENT)
Dept: RADIOLOGY | Facility: HOSPITAL | Age: 72
Discharge: HOME | End: 2022-10-14
Attending: UROLOGY
Payer: MEDICARE

## 2022-10-14 ENCOUNTER — PROCEDURE VISIT (OUTPATIENT)
Dept: UROLOGY | Facility: CLINIC | Age: 72
End: 2022-10-14
Payer: MEDICARE

## 2022-10-14 VITALS
SYSTOLIC BLOOD PRESSURE: 162 MMHG | HEART RATE: 64 BPM | OXYGEN SATURATION: 100 % | DIASTOLIC BLOOD PRESSURE: 84 MMHG | RESPIRATION RATE: 16 BRPM | TEMPERATURE: 97 F

## 2022-10-14 VITALS
WEIGHT: 137 LBS | HEART RATE: 65 BPM | TEMPERATURE: 98 F | BODY MASS INDEX: 23.39 KG/M2 | SYSTOLIC BLOOD PRESSURE: 150 MMHG | DIASTOLIC BLOOD PRESSURE: 81 MMHG | HEIGHT: 64 IN

## 2022-10-14 DIAGNOSIS — R31.21 ASYMPTOMATIC MICROSCOPIC HEMATURIA: Primary | ICD-10-CM

## 2022-10-14 DIAGNOSIS — R31.29 MICROSCOPIC HEMATURIA: ICD-10-CM

## 2022-10-14 DIAGNOSIS — M85.80 OSTEOPENIA WITH HIGH RISK OF FRACTURE: Primary | ICD-10-CM

## 2022-10-14 PROCEDURE — 52000 PR CYSTOURETHROSCOPY: ICD-10-PCS | Mod: S$GLB,,, | Performed by: UROLOGY

## 2022-10-14 PROCEDURE — 76770 US RETROPERITONEAL COMPLETE: ICD-10-PCS | Mod: 26,,, | Performed by: RADIOLOGY

## 2022-10-14 PROCEDURE — 52000 CYSTOURETHROSCOPY: CPT | Mod: S$GLB,,, | Performed by: UROLOGY

## 2022-10-14 PROCEDURE — 96372 THER/PROPH/DIAG INJ SC/IM: CPT

## 2022-10-14 PROCEDURE — 76770 US EXAM ABDO BACK WALL COMP: CPT | Mod: TC

## 2022-10-14 PROCEDURE — 63600175 PHARM REV CODE 636 W HCPCS: Mod: JG | Performed by: PHYSICIAN ASSISTANT

## 2022-10-14 PROCEDURE — 76770 US EXAM ABDO BACK WALL COMP: CPT | Mod: 26,,, | Performed by: RADIOLOGY

## 2022-10-14 RX ADMIN — DENOSUMAB 60 MG: 60 INJECTION SUBCUTANEOUS at 12:10

## 2022-10-14 NOTE — NURSING
Prolia 60 mg q 6 months  Last dose given-n/a    Any invasive dental procedures in past 3 months or upcoming 3 months: no    Last Rheumatology provider visit- Seen by OLAMIDE Taylor on 9/27/22    Recent labs? 10/7/22;  CKD pt needing repeat labs in 10 days-no   Lab Results   Component Value Date    CALCIUM 9.2 10/07/2022     Lab Results   Component Value Date    CREATININE 0.8 10/07/2022     Lab Results   Component Value Date    ESTGFRAFRICA >60.0 04/22/2022     Lab Results   Component Value Date    EGFRNONAA >60.0 04/22/2022     Lab Results   Component Value Date    [id number] 57 08/30/2022              Prolia 60 mg/ml administered SQ to Left arm. Tolerated without any complaints. No redness, swelling, or drainage noted to site. Instructed to remain in clinic 15 minutes after administration to monitor for any s/sx of reaction. Pt instructed on signs and symptoms of reaction to report. Verbalizes understanding.

## 2022-10-14 NOTE — PROCEDURES
Procedures    Procedure:  Diagnostic Cystoscopy    Indications: microhematuria    UA: normal, see lab results for values    Procedure in Detail: After proper consents were obtained, the patient was prepped and draped in normal sterile fashion for diagnostic cystoscopy. 5 ml of lidocaine jelly was instilled in the urethra. The flexible cystoscope was then introduced into the urethra, and advanced into the bladder under direct vision. The urethral mucosa appeared normal, and no strictures were noted. The sphincter was normal. The bladder neck was normal. Inspection of the interior of the bladder was then carried out. The trigone was unremarkable, with no mucosal lesions. The ureteral orifices were normal in position and configuration. Systematic inspection of the mucosa of the bladder it was then carried out, rotating the cystoscope so that all areas of the left and right lateral walls, the dome of the bladder, and the posterior wall were all visualized. The cystoscope was then advanced further into the bladder, and maximum deflection of the scope was performed so that the bladder neck could be inspected. No mucosal lesions were noted there. The cystoscope was then removed, and the procedure terminated. Patient tolerated the procedure well. No complications.     Findings: normal bladder, no tumors    Disposition:  - negative microhematuria work-up, no need for repeat work-up unless patient has significant increase in number of RBCs/hpf  - f/u PRN    Eduardo Meneses MD

## 2022-10-19 ENCOUNTER — PES CALL (OUTPATIENT)
Dept: ADMINISTRATIVE | Facility: CLINIC | Age: 72
End: 2022-10-19
Payer: MEDICARE

## 2022-10-24 ENCOUNTER — PES CALL (OUTPATIENT)
Dept: ADMINISTRATIVE | Facility: CLINIC | Age: 72
End: 2022-10-24
Payer: MEDICARE

## 2022-10-27 ENCOUNTER — TELEPHONE (OUTPATIENT)
Dept: UROLOGY | Facility: CLINIC | Age: 72
End: 2022-10-27
Payer: MEDICARE

## 2022-11-01 ENCOUNTER — PES CALL (OUTPATIENT)
Dept: ADMINISTRATIVE | Facility: CLINIC | Age: 72
End: 2022-11-01
Payer: MEDICARE

## 2022-11-18 ENCOUNTER — TELEPHONE (OUTPATIENT)
Dept: ADMINISTRATIVE | Facility: CLINIC | Age: 72
End: 2022-11-18
Payer: MEDICARE

## 2022-11-18 NOTE — TELEPHONE ENCOUNTER
Called pt, no answer; left message informing pt I was calling to remind pt of her in office EAWV on 11/21/22 and to return my call if she had any questions; left my name and number

## 2022-11-18 NOTE — TELEPHONE ENCOUNTER
Called pt; no answer; could not leave a message due to voice mail not being set up; I was calling to confirm pt's in office EAV appt on 11/21/22

## 2022-11-21 ENCOUNTER — OFFICE VISIT (OUTPATIENT)
Dept: FAMILY MEDICINE | Facility: CLINIC | Age: 72
End: 2022-11-21
Payer: MEDICARE

## 2022-11-21 VITALS
HEART RATE: 70 BPM | BODY MASS INDEX: 23.51 KG/M2 | HEIGHT: 64 IN | DIASTOLIC BLOOD PRESSURE: 77 MMHG | SYSTOLIC BLOOD PRESSURE: 138 MMHG | TEMPERATURE: 98 F | WEIGHT: 137.69 LBS

## 2022-11-21 DIAGNOSIS — H91.91 DECREASED HEARING, RIGHT: ICD-10-CM

## 2022-11-21 DIAGNOSIS — M87.029 AVASCULAR NECROSIS OF HEAD OF HUMERUS, UNSPECIFIED LATERALITY: ICD-10-CM

## 2022-11-21 DIAGNOSIS — Z12.11 SCREENING FOR COLON CANCER: ICD-10-CM

## 2022-11-21 DIAGNOSIS — Z76.0 MEDICATION REFILL: ICD-10-CM

## 2022-11-21 DIAGNOSIS — M32.8 OTHER FORMS OF SYSTEMIC LUPUS ERYTHEMATOSUS, UNSPECIFIED ORGAN INVOLVEMENT STATUS: ICD-10-CM

## 2022-11-21 DIAGNOSIS — Z86.010 HISTORY OF ADENOMATOUS POLYP OF COLON: ICD-10-CM

## 2022-11-21 DIAGNOSIS — I73.00 RAYNAUD'S PHENOMENON WITHOUT GANGRENE: ICD-10-CM

## 2022-11-21 DIAGNOSIS — Z00.00 ENCOUNTER FOR PREVENTIVE CARE: Primary | ICD-10-CM

## 2022-11-21 DIAGNOSIS — M85.80 OSTEOPENIA, UNSPECIFIED LOCATION: ICD-10-CM

## 2022-11-21 DIAGNOSIS — M51.9 LUMBAR DISC DISEASE: ICD-10-CM

## 2022-11-21 DIAGNOSIS — D84.9 IMMUNOCOMPROMISED: ICD-10-CM

## 2022-11-21 DIAGNOSIS — M15.9 PRIMARY OSTEOARTHRITIS INVOLVING MULTIPLE JOINTS: ICD-10-CM

## 2022-11-21 DIAGNOSIS — E55.9 VITAMIN D DEFICIENCY: ICD-10-CM

## 2022-11-21 DIAGNOSIS — Z86.010 PERSONAL HISTORY OF COLONIC POLYPS: ICD-10-CM

## 2022-11-21 DIAGNOSIS — H61.21 IMPACTED CERUMEN, RIGHT EAR: ICD-10-CM

## 2022-11-21 PROCEDURE — 3008F BODY MASS INDEX DOCD: CPT | Mod: CPTII,S$GLB,, | Performed by: NURSE PRACTITIONER

## 2022-11-21 PROCEDURE — 3288F FALL RISK ASSESSMENT DOCD: CPT | Mod: CPTII,S$GLB,, | Performed by: NURSE PRACTITIONER

## 2022-11-21 PROCEDURE — 3078F DIAST BP <80 MM HG: CPT | Mod: CPTII,S$GLB,, | Performed by: NURSE PRACTITIONER

## 2022-11-21 PROCEDURE — 1100F PR PT FALLS ASSESS DOC 2+ FALLS/FALL W/INJURY/YR: ICD-10-PCS | Mod: CPTII,S$GLB,, | Performed by: NURSE PRACTITIONER

## 2022-11-21 PROCEDURE — 1126F PR PAIN SEVERITY QUANTIFIED, NO PAIN PRESENT: ICD-10-PCS | Mod: CPTII,S$GLB,, | Performed by: NURSE PRACTITIONER

## 2022-11-21 PROCEDURE — 99999 PR PBB SHADOW E&M-EST. PATIENT-LVL V: ICD-10-PCS | Mod: PBBFAC,,, | Performed by: NURSE PRACTITIONER

## 2022-11-21 PROCEDURE — 3075F SYST BP GE 130 - 139MM HG: CPT | Mod: CPTII,S$GLB,, | Performed by: NURSE PRACTITIONER

## 2022-11-21 PROCEDURE — 99999 PR PBB SHADOW E&M-EST. PATIENT-LVL V: CPT | Mod: PBBFAC,,, | Performed by: NURSE PRACTITIONER

## 2022-11-21 PROCEDURE — 1160F RVW MEDS BY RX/DR IN RCRD: CPT | Mod: CPTII,S$GLB,, | Performed by: NURSE PRACTITIONER

## 2022-11-21 PROCEDURE — 3078F PR MOST RECENT DIASTOLIC BLOOD PRESSURE < 80 MM HG: ICD-10-PCS | Mod: CPTII,S$GLB,, | Performed by: NURSE PRACTITIONER

## 2022-11-21 PROCEDURE — 3288F PR FALLS RISK ASSESSMENT DOCUMENTED: ICD-10-PCS | Mod: CPTII,S$GLB,, | Performed by: NURSE PRACTITIONER

## 2022-11-21 PROCEDURE — G0439 PPPS, SUBSEQ VISIT: HCPCS | Mod: S$GLB,,, | Performed by: NURSE PRACTITIONER

## 2022-11-21 PROCEDURE — 3008F PR BODY MASS INDEX (BMI) DOCUMENTED: ICD-10-PCS | Mod: CPTII,S$GLB,, | Performed by: NURSE PRACTITIONER

## 2022-11-21 PROCEDURE — 1159F MED LIST DOCD IN RCRD: CPT | Mod: CPTII,S$GLB,, | Performed by: NURSE PRACTITIONER

## 2022-11-21 PROCEDURE — 1159F PR MEDICATION LIST DOCUMENTED IN MEDICAL RECORD: ICD-10-PCS | Mod: CPTII,S$GLB,, | Performed by: NURSE PRACTITIONER

## 2022-11-21 PROCEDURE — 3075F PR MOST RECENT SYSTOLIC BLOOD PRESS GE 130-139MM HG: ICD-10-PCS | Mod: CPTII,S$GLB,, | Performed by: NURSE PRACTITIONER

## 2022-11-21 PROCEDURE — 1160F PR REVIEW ALL MEDS BY PRESCRIBER/CLIN PHARMACIST DOCUMENTED: ICD-10-PCS | Mod: CPTII,S$GLB,, | Performed by: NURSE PRACTITIONER

## 2022-11-21 PROCEDURE — 1126F AMNT PAIN NOTED NONE PRSNT: CPT | Mod: CPTII,S$GLB,, | Performed by: NURSE PRACTITIONER

## 2022-11-21 PROCEDURE — 1100F PTFALLS ASSESS-DOCD GE2>/YR: CPT | Mod: CPTII,S$GLB,, | Performed by: NURSE PRACTITIONER

## 2022-11-21 PROCEDURE — G0439 PR MEDICARE ANNUAL WELLNESS SUBSEQUENT VISIT: ICD-10-PCS | Mod: S$GLB,,, | Performed by: NURSE PRACTITIONER

## 2022-11-21 RX ORDER — ERGOCALCIFEROL 1.25 MG/1
50000 CAPSULE ORAL
Qty: 12 CAPSULE | Refills: 0 | Status: SHIPPED | OUTPATIENT
Start: 2022-11-21 | End: 2023-02-28 | Stop reason: SDUPTHER

## 2022-11-21 NOTE — PATIENT INSTRUCTIONS
Continue current plan of care  F/U with PCP, specialists as advised  RTC as needed   Pls book patient for a follow up appointment in 1 month

## 2022-11-22 NOTE — PROGRESS NOTES
"  Alice Loza presented for a follow-up Medicare AWV today. The following components were reviewed and updated:    Medical history  Family History  Social history  Allergies and Current Medications  Health Risk Assessment  Health Maintenance  Care Team    **See Completed Assessments for Annual Wellness visit with in the encounter summary    The following assessments were completed:  Depression Screening  Cognitive function Screening  Timed Get Up Test  Whisper Test  PHQ-2  Nutrition screen  ADL  PAQ  Osteoporosis risk  CAGE  Living situation    Vitals:    11/21/22 1105   BP: 138/77   Pulse: 70   Temp: 98.2 °F (36.8 °C)   TempSrc: Oral   Weight: 62.5 kg (137 lb 10.8 oz)   Height: 5' 4" (1.626 m)     Body mass index is 23.63 kg/m².   ]        Diagnoses and health risks identified today and associated recommendations/orders:  1. Encounter for preventive care  Stable  Up to date    2. Other forms of systemic lupus erythematosus, unspecified organ involvement status  Stable  Managed by rheumatology  Continue current medications, plan of care  F/U with specialist as advised    3. Immunocompromised  Stable  Managed by rheumatology  Continue current medications, plan of care  F/U with specialist as advised    4. Avascular necrosis of head of humerus, unspecified laterality  Stable  Managed by orthopedics  Continue current medications, plan of care  F/U with specialist as advised    5. Primary osteoarthritis involving multiple joints  Stable  Managed by rheumatology  Continue current medications, plan of care  F/U with specialist as advised    6. Osteopenia, unspecified location  Stable  Managed by PCP, rheumatology  Continue current medications, plan of care  F/U with PCP, specialist as advised    7. Raynaud's phenomenon without gangrene  Stable  Managed by rheumatology  Continue current medications, plan of care  F/U with specialist as advised    8. Vitamin D deficiency  Stable  Managed by PCP  Continue current " medications, plan of care  F/U with PCP as advised    9. Lumbar disc disease  Stable  Managed by pain management  Continue current medications, plan of care  F/U with specialist as advised    10. History of adenomatous polyp of colon  Stable  Colonoscopy due    11. Impacted cerumen, right ear  - Ambulatory referral/consult to ENT; Future    12. Decreased hearing, right  - Ambulatory referral/consult to ENT; Future    13. Screening for colon cancer  - Case Request Endoscopy: COLONOSCOPY    14. Personal history of colonic polyps  - Case Request Endoscopy: COLONOSCOPY    14. Medication refill  - ergocalciferol (ERGOCALCIFEROL) 50,000 unit Cap; Take 1 capsule (50,000 Units total) by mouth every 7 days.  Dispense: 12 capsule; Refill: 0    No current opioid use  Annual exam scheduled with PCP  I offered to discuss end of life issues, including information on how to make advance directives that the patient could use to name someone who would make medical decisions on their behalf if they became too ill to make themselves.    ___Patient declined - already done.    _x__Patient is interested, I provided paperwork and offered to discuss  Provided Alice with a 5-10 year written screening schedule and personal prevention plan. Recommendations were developed using the USPSTF age appropriate recommendations. Education, counseling, and referrals were provided as needed.  After Visit Summary printed and given to patient which includes a list of additional screenings\tests needed.    No follow-ups on file.      Tran Galo NP

## 2022-11-28 ENCOUNTER — OFFICE VISIT (OUTPATIENT)
Dept: OTOLARYNGOLOGY | Facility: CLINIC | Age: 72
End: 2022-11-28
Payer: MEDICARE

## 2022-11-28 VITALS — BODY MASS INDEX: 23.42 KG/M2 | TEMPERATURE: 97 F | WEIGHT: 136.44 LBS

## 2022-11-28 DIAGNOSIS — H91.91 DECREASED HEARING, RIGHT: ICD-10-CM

## 2022-11-28 DIAGNOSIS — H61.21 IMPACTED CERUMEN, RIGHT EAR: ICD-10-CM

## 2022-11-28 PROCEDURE — 3288F FALL RISK ASSESSMENT DOCD: CPT | Mod: CPTII,S$GLB,, | Performed by: STUDENT IN AN ORGANIZED HEALTH CARE EDUCATION/TRAINING PROGRAM

## 2022-11-28 PROCEDURE — 1126F PR PAIN SEVERITY QUANTIFIED, NO PAIN PRESENT: ICD-10-PCS | Mod: CPTII,S$GLB,, | Performed by: STUDENT IN AN ORGANIZED HEALTH CARE EDUCATION/TRAINING PROGRAM

## 2022-11-28 PROCEDURE — 3008F PR BODY MASS INDEX (BMI) DOCUMENTED: ICD-10-PCS | Mod: CPTII,S$GLB,, | Performed by: STUDENT IN AN ORGANIZED HEALTH CARE EDUCATION/TRAINING PROGRAM

## 2022-11-28 PROCEDURE — 99203 OFFICE O/P NEW LOW 30 MIN: CPT | Mod: S$GLB,,, | Performed by: STUDENT IN AN ORGANIZED HEALTH CARE EDUCATION/TRAINING PROGRAM

## 2022-11-28 PROCEDURE — 99999 PR PBB SHADOW E&M-EST. PATIENT-LVL III: CPT | Mod: PBBFAC,,, | Performed by: STUDENT IN AN ORGANIZED HEALTH CARE EDUCATION/TRAINING PROGRAM

## 2022-11-28 PROCEDURE — 1101F PT FALLS ASSESS-DOCD LE1/YR: CPT | Mod: CPTII,S$GLB,, | Performed by: STUDENT IN AN ORGANIZED HEALTH CARE EDUCATION/TRAINING PROGRAM

## 2022-11-28 PROCEDURE — 99999 PR PBB SHADOW E&M-EST. PATIENT-LVL III: ICD-10-PCS | Mod: PBBFAC,,, | Performed by: STUDENT IN AN ORGANIZED HEALTH CARE EDUCATION/TRAINING PROGRAM

## 2022-11-28 PROCEDURE — 1126F AMNT PAIN NOTED NONE PRSNT: CPT | Mod: CPTII,S$GLB,, | Performed by: STUDENT IN AN ORGANIZED HEALTH CARE EDUCATION/TRAINING PROGRAM

## 2022-11-28 PROCEDURE — 1159F PR MEDICATION LIST DOCUMENTED IN MEDICAL RECORD: ICD-10-PCS | Mod: CPTII,S$GLB,, | Performed by: STUDENT IN AN ORGANIZED HEALTH CARE EDUCATION/TRAINING PROGRAM

## 2022-11-28 PROCEDURE — 1101F PR PT FALLS ASSESS DOC 0-1 FALLS W/OUT INJ PAST YR: ICD-10-PCS | Mod: CPTII,S$GLB,, | Performed by: STUDENT IN AN ORGANIZED HEALTH CARE EDUCATION/TRAINING PROGRAM

## 2022-11-28 PROCEDURE — 3008F BODY MASS INDEX DOCD: CPT | Mod: CPTII,S$GLB,, | Performed by: STUDENT IN AN ORGANIZED HEALTH CARE EDUCATION/TRAINING PROGRAM

## 2022-11-28 PROCEDURE — 3288F PR FALLS RISK ASSESSMENT DOCUMENTED: ICD-10-PCS | Mod: CPTII,S$GLB,, | Performed by: STUDENT IN AN ORGANIZED HEALTH CARE EDUCATION/TRAINING PROGRAM

## 2022-11-28 PROCEDURE — 1159F MED LIST DOCD IN RCRD: CPT | Mod: CPTII,S$GLB,, | Performed by: STUDENT IN AN ORGANIZED HEALTH CARE EDUCATION/TRAINING PROGRAM

## 2022-11-28 PROCEDURE — 99203 PR OFFICE/OUTPT VISIT, NEW, LEVL III, 30-44 MIN: ICD-10-PCS | Mod: S$GLB,,, | Performed by: STUDENT IN AN ORGANIZED HEALTH CARE EDUCATION/TRAINING PROGRAM

## 2022-11-28 NOTE — PROGRESS NOTES
[place]  [address] [facility] [place] [address]  Subjective:   Patient: Alice Loza [id number], :1950   Visit date:2022 8:37 AM    Chief Complaint:  Cerumen Impaction        HPI:     Alice Loza is a 72 y.o. female whom I am asked to see for evaluation of progressively worsening hearing loss in the right ear for the past few months.   Alice rates the severity as moderate.  No exacerbating or relieving factors.  There is no drainage from the ears.      Her meds, allergies, medical, surgical, social & family histories were reviewed & updated:  -     She has a current medication list which includes the following prescription(s): cyclobenzaprine, ergocalciferol, hydroxychloroquine, meclizine, meloxicam, nystatin, pravastatin, vitamin e, desonide, gabapentin, and triamcinolone acetonide 0.1%.  -     She  has a past medical history of Avascular necrosis of hip, Avascular necrosis of humeral head, Cataract, Lumbar disc disease, Lupus, Osteoarthritis, Osteopenia, Osteoporosis, Raynaud phenomenon, Systemic lupus erythematosus, Tubular adenoma of colon (2016), and Unspecified vitamin D deficiency.   -     She does not have any pertinent problems on file.   -     She  has a past surgical history that includes Cholecystectomy; Total hip arthroplasty; and Colonoscopy (N/A, 2016).  -     She  reports that she has never smoked. She has never used smokeless tobacco. She reports that she does not drink alcohol and does not use drugs.  -     Her family history includes Breast cancer in her sister; Cervical cancer in her mother; Heart attack (age of onset: 62) in her father.  -     She is allergic to no known drug allergies.      Review of Systems:  -     Allergic/Immunologic: is allergic to no known drug allergies..  -     Constitutional: Current temp: 97.2 °F (36.2 °C) (Temporal)        Objective:     Physical Exam:  Vitals:  Temp 97.2 °F (36.2 °C)  (Temporal)   Wt 61.9 kg (136 lb 7.4 oz)   BMI 23.42 kg/m²   Communication:  Able to communicate, no hoarseness.  Head & Face:  Normocephalic, atraumatic, no sinus tenderness.  Eyes:  Extraocular motions intact.  Ears:  Otoscopy of external auditory canals reveals impaction of right ear canals.  With the patient in the supine position, we used the operating microscope to examine both ears with the appropriate sized ear speculum.  A variety of sterile, micro-instruments were utilized to remove the cerumen atraumatically from the impacted ear(s).   After removal, the ears were reexamined-  Right Ear:  No mass/lesion of auricle. The external auditory canals is without erythema or discharge. Pneumatic otoscopy of the tympanic membrane revealed no perforation, with no fluid in middle ear. Clinical speech reception thresholds grossly normal.  Left Ear:  No mass/lesion of auricle. The external auditory canals is without erythema or discharge. Pneumatic otoscopy of the tympanic membrane revealed no perforation, with no fluid in middle ear. Clinical speech reception thresholds grossly normal  Nose:  No masses/lesions of external nose, nasal mucosa, septum, and turbinates were within normal limits.  Mouth:  No mass/lesion of lips, teeth, gums, hard/soft palate, tongue, tonsils, or oropharynx.  Neck & Lymphatics:  No cervical lymphadenopathy, no neck mass/crepitus/ asymmetry, trachea is midline, no thyroid enlargement/tenderness/mass.  Neuro/Psych: Alert with normal mood and affect.   Respiration/Chest:  Symmetric expansion during respiration, normal respiratory effort.  Skin:  Warm and intact.    Assessment & Plan:       -     Cerumen Impaction - Alice has cerumen impaction.  We discussed preventative measures and treatment options.  Q-tips must be avoided, instead the ears can be cleaned with OTC ear rinses (or a mixture of alcohol & vinegar in equal parts).   For hard wax, Alice may place mineral oil/baby oil in the  ear with a cotton ball at night and remove in the shower.  This will assist in softening the wax and allow it to drain out on its own. If the cerumen impacts the ear canal and causes hearing loss or infection she needs to follow-up in the clinic for treatment and cleaning.

## 2023-01-30 DIAGNOSIS — Z13.9 SCREENING PROCEDURE: Primary | ICD-10-CM

## 2023-02-21 ENCOUNTER — LAB VISIT (OUTPATIENT)
Dept: LAB | Facility: HOSPITAL | Age: 73
End: 2023-02-21
Attending: PHYSICIAN ASSISTANT
Payer: MEDICARE

## 2023-02-21 DIAGNOSIS — M32.19 OTHER SYSTEMIC LUPUS ERYTHEMATOSUS WITH OTHER ORGAN INVOLVEMENT: ICD-10-CM

## 2023-02-21 LAB
ALBUMIN SERPL BCP-MCNC: 3.8 G/DL (ref 3.5–5.2)
ALP SERPL-CCNC: 57 U/L (ref 55–135)
ALT SERPL W/O P-5'-P-CCNC: 12 U/L (ref 10–44)
ANION GAP SERPL CALC-SCNC: 9 MMOL/L (ref 8–16)
AST SERPL-CCNC: 20 U/L (ref 10–40)
BACTERIA #/AREA URNS HPF: NORMAL /HPF
BASOPHILS # BLD AUTO: 0.03 K/UL (ref 0–0.2)
BASOPHILS NFR BLD: 0.6 % (ref 0–1.9)
BILIRUB SERPL-MCNC: 1.4 MG/DL (ref 0.1–1)
BILIRUB UR QL STRIP: NEGATIVE
BUN SERPL-MCNC: 12 MG/DL (ref 8–23)
C3 SERPL-MCNC: 143 MG/DL (ref 50–180)
C4 SERPL-MCNC: 32 MG/DL (ref 11–44)
CALCIUM SERPL-MCNC: 9.4 MG/DL (ref 8.7–10.5)
CHLORIDE SERPL-SCNC: 109 MMOL/L (ref 95–110)
CLARITY UR: CLEAR
CO2 SERPL-SCNC: 25 MMOL/L (ref 23–29)
COLOR UR: YELLOW
CREAT SERPL-MCNC: 0.9 MG/DL (ref 0.5–1.4)
CREAT UR-MCNC: 182 MG/DL (ref 15–325)
CRP SERPL-MCNC: 2.4 MG/L (ref 0–8.2)
DIFFERENTIAL METHOD: ABNORMAL
EOSINOPHIL # BLD AUTO: 0.1 K/UL (ref 0–0.5)
EOSINOPHIL NFR BLD: 2.3 % (ref 0–8)
ERYTHROCYTE [DISTWIDTH] IN BLOOD BY AUTOMATED COUNT: 12.7 % (ref 11.5–14.5)
ERYTHROCYTE [SEDIMENTATION RATE] IN BLOOD BY WESTERGREN METHOD: 19 MM/HR (ref 0–20)
EST. GFR  (NO RACE VARIABLE): >60 ML/MIN/1.73 M^2
GLUCOSE SERPL-MCNC: 97 MG/DL (ref 70–110)
GLUCOSE UR QL STRIP: NEGATIVE
HCT VFR BLD AUTO: 41.3 % (ref 37–48.5)
HGB BLD-MCNC: 13.5 G/DL (ref 12–16)
HGB UR QL STRIP: ABNORMAL
IMM GRANULOCYTES # BLD AUTO: 0.01 K/UL (ref 0–0.04)
IMM GRANULOCYTES NFR BLD AUTO: 0.2 % (ref 0–0.5)
KETONES UR QL STRIP: NEGATIVE
LEUKOCYTE ESTERASE UR QL STRIP: NEGATIVE
LYMPHOCYTES # BLD AUTO: 2.4 K/UL (ref 1–4.8)
LYMPHOCYTES NFR BLD: 49.4 % (ref 18–48)
MCH RBC QN AUTO: 31.7 PG (ref 27–31)
MCHC RBC AUTO-ENTMCNC: 32.7 G/DL (ref 32–36)
MCV RBC AUTO: 97 FL (ref 82–98)
MICROSCOPIC COMMENT: NORMAL
MONOCYTES # BLD AUTO: 0.5 K/UL (ref 0.3–1)
MONOCYTES NFR BLD: 10.9 % (ref 4–15)
NEUTROPHILS # BLD AUTO: 1.8 K/UL (ref 1.8–7.7)
NEUTROPHILS NFR BLD: 36.8 % (ref 38–73)
NITRITE UR QL STRIP: NEGATIVE
NRBC BLD-RTO: 0 /100 WBC
PH UR STRIP: 6 [PH] (ref 5–8)
PLATELET # BLD AUTO: 217 K/UL (ref 150–450)
PMV BLD AUTO: 8.5 FL (ref 9.2–12.9)
POTASSIUM SERPL-SCNC: 4.4 MMOL/L (ref 3.5–5.1)
PROT SERPL-MCNC: 7.4 G/DL (ref 6–8.4)
PROT UR QL STRIP: NEGATIVE
PROT UR-MCNC: 10 MG/DL (ref 0–15)
PROT/CREAT UR: 0.05 MG/G{CREAT} (ref 0–0.2)
RBC # BLD AUTO: 4.26 M/UL (ref 4–5.4)
RBC #/AREA URNS HPF: 3 /HPF (ref 0–4)
SODIUM SERPL-SCNC: 143 MMOL/L (ref 136–145)
SP GR UR STRIP: 1.02 (ref 1–1.03)
SQUAMOUS #/AREA URNS HPF: 7 /HPF
URN SPEC COLLECT METH UR: ABNORMAL
WBC # BLD AUTO: 4.86 K/UL (ref 3.9–12.7)
WBC #/AREA URNS HPF: 1 /HPF (ref 0–5)

## 2023-02-21 PROCEDURE — 86140 C-REACTIVE PROTEIN: CPT | Performed by: PHYSICIAN ASSISTANT

## 2023-02-21 PROCEDURE — 81000 URINALYSIS NONAUTO W/SCOPE: CPT | Mod: PO | Performed by: PHYSICIAN ASSISTANT

## 2023-02-21 PROCEDURE — 80053 COMPREHEN METABOLIC PANEL: CPT | Performed by: PHYSICIAN ASSISTANT

## 2023-02-21 PROCEDURE — 82570 ASSAY OF URINE CREATININE: CPT | Performed by: PHYSICIAN ASSISTANT

## 2023-02-21 PROCEDURE — 85651 RBC SED RATE NONAUTOMATED: CPT | Mod: PO | Performed by: PHYSICIAN ASSISTANT

## 2023-02-21 PROCEDURE — 36415 COLL VENOUS BLD VENIPUNCTURE: CPT | Mod: PO | Performed by: PHYSICIAN ASSISTANT

## 2023-02-21 PROCEDURE — 85025 COMPLETE CBC W/AUTO DIFF WBC: CPT | Mod: PO | Performed by: PHYSICIAN ASSISTANT

## 2023-02-21 PROCEDURE — 86160 COMPLEMENT ANTIGEN: CPT | Performed by: PHYSICIAN ASSISTANT

## 2023-02-21 PROCEDURE — 86038 ANTINUCLEAR ANTIBODIES: CPT | Performed by: PHYSICIAN ASSISTANT

## 2023-02-21 PROCEDURE — 86160 COMPLEMENT ANTIGEN: CPT | Mod: 59 | Performed by: PHYSICIAN ASSISTANT

## 2023-02-21 PROCEDURE — 86225 DNA ANTIBODY NATIVE: CPT | Performed by: PHYSICIAN ASSISTANT

## 2023-02-22 LAB — DSDNA AB SER-ACNC: NORMAL [IU]/ML

## 2023-02-23 LAB — ANA SER QL IF: NORMAL

## 2023-02-28 ENCOUNTER — OFFICE VISIT (OUTPATIENT)
Dept: RHEUMATOLOGY | Facility: CLINIC | Age: 73
End: 2023-02-28
Payer: OTHER GOVERNMENT

## 2023-02-28 ENCOUNTER — HOSPITAL ENCOUNTER (OUTPATIENT)
Dept: PREADMISSION TESTING | Facility: HOSPITAL | Age: 73
Discharge: HOME | End: 2023-02-28
Attending: FAMILY MEDICINE
Payer: MEDICARE

## 2023-02-28 VITALS
HEIGHT: 64 IN | HEART RATE: 68 BPM | DIASTOLIC BLOOD PRESSURE: 74 MMHG | SYSTOLIC BLOOD PRESSURE: 135 MMHG | WEIGHT: 136 LBS | BODY MASS INDEX: 23.22 KG/M2

## 2023-02-28 DIAGNOSIS — Z76.0 MEDICATION REFILL: ICD-10-CM

## 2023-02-28 DIAGNOSIS — M87.022 AVASCULAR NECROSIS OF HUMERAL HEAD, LEFT: ICD-10-CM

## 2023-02-28 DIAGNOSIS — M32.19 OTHER SYSTEMIC LUPUS ERYTHEMATOSUS WITH OTHER ORGAN INVOLVEMENT: ICD-10-CM

## 2023-02-28 DIAGNOSIS — E55.9 VITAMIN D DEFICIENCY: ICD-10-CM

## 2023-02-28 DIAGNOSIS — Z86.010 HISTORY OF ADENOMATOUS POLYP OF COLON: ICD-10-CM

## 2023-02-28 DIAGNOSIS — Z13.9 SCREENING PROCEDURE: Primary | ICD-10-CM

## 2023-02-28 DIAGNOSIS — M85.89 OSTEOPENIA OF MULTIPLE SITES: Primary | ICD-10-CM

## 2023-02-28 DIAGNOSIS — K63.5 POLYP OF COLON, UNSPECIFIED PART OF COLON, UNSPECIFIED TYPE: ICD-10-CM

## 2023-02-28 PROCEDURE — 99999 PR PBB SHADOW E&M-EST. PATIENT-LVL IV: ICD-10-PCS | Mod: PBBFAC,,, | Performed by: PHYSICIAN ASSISTANT

## 2023-02-28 PROCEDURE — 99214 OFFICE O/P EST MOD 30 MIN: CPT | Mod: PBBFAC | Performed by: PHYSICIAN ASSISTANT

## 2023-02-28 PROCEDURE — 99999 PR PBB SHADOW E&M-EST. PATIENT-LVL IV: CPT | Mod: PBBFAC,,, | Performed by: PHYSICIAN ASSISTANT

## 2023-02-28 PROCEDURE — 99214 OFFICE O/P EST MOD 30 MIN: CPT | Mod: S$PBB,,, | Performed by: PHYSICIAN ASSISTANT

## 2023-02-28 PROCEDURE — 99214 PR OFFICE/OUTPT VISIT, EST, LEVL IV, 30-39 MIN: ICD-10-PCS | Mod: S$PBB,,, | Performed by: PHYSICIAN ASSISTANT

## 2023-02-28 RX ORDER — HYDROXYCHLOROQUINE SULFATE 200 MG/1
TABLET, FILM COATED ORAL
Qty: 36 TABLET | Refills: 3 | Status: SHIPPED | OUTPATIENT
Start: 2023-02-28 | End: 2023-10-17 | Stop reason: SDUPTHER

## 2023-02-28 RX ORDER — METHOCARBAMOL 500 MG/1
500 TABLET, FILM COATED ORAL 3 TIMES DAILY PRN
COMMUNITY
Start: 2022-10-27 | End: 2023-10-17

## 2023-02-28 RX ORDER — DESONIDE 0.5 MG/G
CREAM TOPICAL 2 TIMES DAILY
Qty: 60 G | Refills: 2 | Status: SHIPPED | OUTPATIENT
Start: 2023-02-28 | End: 2023-03-10

## 2023-02-28 RX ORDER — LIDOCAINE 50 MG/G
1 PATCH TOPICAL
COMMUNITY
Start: 2022-10-27

## 2023-02-28 RX ORDER — CYCLOBENZAPRINE HCL 10 MG
10 TABLET ORAL NIGHTLY
Qty: 90 TABLET | Refills: 3 | Status: SHIPPED | OUTPATIENT
Start: 2023-02-28 | End: 2023-10-17 | Stop reason: SDUPTHER

## 2023-02-28 RX ORDER — ERGOCALCIFEROL 1.25 MG/1
50000 CAPSULE ORAL
Qty: 12 CAPSULE | Refills: 3 | Status: SHIPPED | OUTPATIENT
Start: 2023-02-28 | End: 2023-10-17 | Stop reason: SDUPTHER

## 2023-02-28 NOTE — PROGRESS NOTES
RHEUMATOLOGY OUTPATIENT CLINIC NOTE    2/28/2023    Attending Rheumatologist: Betsy Figueroa PA-C  Primary Care Provider: Nic Cuellar MD   Chief Complaint/Reason For Consultation:  Osteopenia    Subjective:        Alice Loza is a 72 y.o. female here today for follow up recent DEXA scan results- osteopenia w/ high FRAX. She has been on drug holiday since approx 2016. Used fosamax in the past. Had initial appt w/ urology today for micro hematuria.    SLE- in remission but has been significant in the past.  +Raynauds stable on asa and vit e.  She has AVN both shoulders, had seen Dr. Ruiz, was planning replacements but pain improved and surgery delayed. Improved pain in her hands and  has not worsened or improved.     Regarding her lupus, she is on plaquenil 3 times a week. Last several ANAs have been negative as has the dsdna, complements have been normal. utd with eye checks     She denies any fevers, hair loss, sob. no joint swelling.  Has known djd and ddd in her lumbar spine.  Has carpal tunnel type symptoms R hand on and off.  Rheumatologic systems otherwise negative. Physical exam shows no rashes, no active raynauds/digital ulcers. No active synovitis.       Serologies    Lab Results   Component Value Date    MILDRED Negative 06/04/2013     Lab Results   Component Value Date    HEPCAB Negative 01/10/2018      Current Rheum Medications:  HCQ, vit D, prolia  Previous Rheum Medications:   fosamax    Past Medical History:   Diagnosis Date    Avascular necrosis of hip     Left    Avascular necrosis of humeral head     Right    Cataract     Lumbar disc disease     Lupus     Osteoarthritis     Osteopenia     Osteoporosis     Raynaud phenomenon     Systemic lupus erythematosus     Tubular adenoma of colon 09/20/2016    Unspecified vitamin D deficiency      Social History     Socioeconomic History    Marital status:    Tobacco Use    Smoking status: Never    Smokeless tobacco: Never  "  Substance and Sexual Activity    Alcohol use: No    Drug use: No    Sexual activity: Not Currently     Review of patient's allergies indicates:   Allergen Reactions    No known drug allergies        Objective:   /74   Pulse 68   Ht 5' 4" (1.626 m)   Wt 61.7 kg (136 lb)   BMI 23.34 kg/m²     Immunization History   Administered Date(s) Administered    COVID-19, MRNA, LN-S, PF (MODERNA FULL 0.5 ML DOSE) 01/11/2021, 02/08/2021, 11/17/2021    Influenza - High Dose - PF (65 years and older) 10/13/2015, 10/25/2016, 02/04/2020    Influenza A (H1N1) 2009 Monovalent - IM 12/16/2009    Influenza Split 10/04/2013    Pneumococcal Conjugate - 13 Valent 10/15/2014    Pneumococcal Polysaccharide - 23 Valent 01/09/2002, 02/04/2020     Current Outpatient Medications   Medication Instructions    cyclobenzaprine (FLEXERIL) 10 mg, Oral, Nightly    desonide (DESOWEN) 0.05 % cream Topical (Top), 2 times daily    ergocalciferol (ERGOCALCIFEROL) 50,000 Units, Oral, Every 7 days    gabapentin (NEURONTIN) 300 mg, Oral, 3 times daily    hydrOXYchloroQUINE (PLAQUENIL) 200 mg tablet Take one tablet by  Mouth three times a week    meclizine (ANTIVERT) 12.5 mg, Oral, 2 times daily PRN    meloxicam (MOBIC) 15 mg, Oral, Daily, As needed for pain    nystatin (MYCOSTATIN) powder Apply topically Twice daily.    polyethylene glycol (COLYTE) 240-22.72-6.72 -5.84 gram SolR 4,000 mLs, Oral, Once    pravastatin (PRAVACHOL) 20 mg, Oral, Daily    triamcinolone acetonide 0.1% (KENALOG) 0.1 % cream Topical (Top), 2 times daily PRN, Thin film to area on face    vitamin E 400 Units, Oral, Daily       Recent Results (from the past 336 hour(s))   Urinalysis    Collection Time: 02/21/23  9:52 AM   Result Value Ref Range    Specimen UA Urine, Clean Catch     Color, UA Yellow Yellow, Straw, Mary    Appearance, UA Clear Clear    pH, UA 6.0 5.0 - 8.0    Specific Gravity, UA 1.025 1.005 - 1.030    Protein, UA Negative Negative    Glucose, UA Negative " Negative    Ketones, UA Negative Negative    Bilirubin (UA) Negative Negative    Occult Blood UA 2+ (A) Negative    Nitrite, UA Negative Negative    Leukocytes, UA Negative Negative   Protein/Creatinine Ratio, Urine    Collection Time: 02/21/23  9:52 AM   Result Value Ref Range    Protein, Urine Random 10 0 - 15 mg/dL    Creatinine, Urine 182.0 15.0 - 325.0 mg/dL    Prot/Creat Ratio, Urine 0.05 0.00 - 0.20   Urinalysis Microscopic    Collection Time: 02/21/23  9:52 AM   Result Value Ref Range    RBC, UA 3 0 - 4 /hpf    WBC, UA 1 0 - 5 /hpf    Bacteria Occasional None-Occ /hpf    Squam Epithel, UA 7 /hpf    Microscopic Comment SEE COMMENT    CBC Auto Differential    Collection Time: 02/21/23  9:54 AM   Result Value Ref Range    WBC 4.86 3.90 - 12.70 K/uL    RBC 4.26 4.00 - 5.40 M/uL    Hemoglobin 13.5 12.0 - 16.0 g/dL    Hematocrit 41.3 37.0 - 48.5 %    MCV 97 82 - 98 fL    MCH 31.7 (H) 27.0 - 31.0 pg    MCHC 32.7 32.0 - 36.0 g/dL    RDW 12.7 11.5 - 14.5 %    Platelets 217 150 - 450 K/uL    MPV 8.5 (L) 9.2 - 12.9 fL    Immature Granulocytes 0.2 0.0 - 0.5 %    Gran # (ANC) 1.8 1.8 - 7.7 K/uL    Immature Grans (Abs) 0.01 0.00 - 0.04 K/uL    Lymph # 2.4 1.0 - 4.8 K/uL    Mono # 0.5 0.3 - 1.0 K/uL    Eos # 0.1 0.0 - 0.5 K/uL    Baso # 0.03 0.00 - 0.20 K/uL    nRBC 0 0 /100 WBC    Gran % 36.8 (L) 38.0 - 73.0 %    Lymph % 49.4 (H) 18.0 - 48.0 %    Mono % 10.9 4.0 - 15.0 %    Eosinophil % 2.3 0.0 - 8.0 %    Basophil % 0.6 0.0 - 1.9 %    Differential Method Automated    MILDRED Screen w/Reflex    Collection Time: 02/21/23  9:56 AM   Result Value Ref Range    MILDRED Screen Negative <1:80 Negative <1:80   Anti-DNA Ab, Double-Stranded    Collection Time: 02/21/23  9:56 AM   Result Value Ref Range    ds DNA Ab Negative 1:10 Negative 1:10   C3 Complement    Collection Time: 02/21/23  9:56 AM   Result Value Ref Range    Complement (C-3) 143 50 - 180 mg/dL   C4 Complement    Collection Time: 02/21/23  9:56 AM   Result Value Ref Range     Complement (C-4) 32 11 - 44 mg/dL   Sedimentation rate    Collection Time: 02/21/23  9:56 AM   Result Value Ref Range    Sed Rate 19 0 - 20 mm/Hr   C-Reactive Protein    Collection Time: 02/21/23  9:56 AM   Result Value Ref Range    CRP 2.4 0.0 - 8.2 mg/L   Comprehensive Metabolic Panel    Collection Time: 02/21/23  9:56 AM   Result Value Ref Range    Sodium 143 136 - 145 mmol/L    Potassium 4.4 3.5 - 5.1 mmol/L    Chloride 109 95 - 110 mmol/L    CO2 25 23 - 29 mmol/L    Glucose 97 70 - 110 mg/dL    BUN 12 8 - 23 mg/dL    Creatinine 0.9 0.5 - 1.4 mg/dL    Calcium 9.4 8.7 - 10.5 mg/dL    Total Protein 7.4 6.0 - 8.4 g/dL    Albumin 3.8 3.5 - 5.2 g/dL    Total Bilirubin 1.4 (H) 0.1 - 1.0 mg/dL    Alkaline Phosphatase 57 55 - 135 U/L    AST 20 10 - 40 U/L    ALT 12 10 - 44 U/L    Anion Gap 9 8 - 16 mmol/L    eGFR >60.0 >60 mL/min/1.73 m^2       Imaging:  All imaging reviewed and independently interpreted by me.    XR CERVICAL SPINE 5 VIEW WITH FLEX AND EXT 4/29/22  FINDINGS:  Vertebral body heights maintained.  No spondylolisthesis.  Similar degenerative disc height loss and osteophyte changes noted at C4-5 and C5-6.  Minimal anterolisthesis of C3 on C4 in flexion with alignment unchanged in extension compared to neutral.  No high-grade neural foraminal narrowing.  Multilevel facet arthropathy.  Lung apices clear.  Prevertebral soft tissues unremarkable.    DEXA BONE DENSITY SPINE HIP 9/20/22  COMPARISON:  07/25/2018     FINDINGS:  The L1 to L4 vertebral bone mineral density is equal to 1.215 g/cm squared with a T score of 0.3.  There has been no significant change relative to the prior study.     The left femoral neck bone mineral density is equal to 0.731 g/cm squared with a T score of -2.2.  There has been  no significant change relative to the prior study.     There is a 12.7% risk of a major osteoporotic fracture and a 3.3% risk of hip fracture in the next 10 years (FRAX).     Impression: Osteopenia    Assessment:      1. Osteopenia of multiple sites    2. Avascular necrosis of humeral head, left    3. Other systemic lupus erythematosus with other organ involvement    4. Medication refill    5. Vitamin D deficiency          Plan:       Plan to resume therapy for osteopenia w/ high FRAX w/ prolia- due for 2nd dose in a few weeks   Lupus w/o signs of activity at this time. lab results reviewed and discussed findings with the patient. Cont HCQ as prescribed  Continue to monitor radicular sx of bilateral UE at this time  Return to clinic: 6 mos w/ sle labs; schedule prolia for 10/14/22    The patient understands, chooses and consents to this plan and accepts all the risks which include but are not limited to the risks mentioned above.     Method of contact with patient concerns: [facility]    30 minutes of total time spent on the encounter, which includes face to face time and non-face to face time preparing to see the patient (eg, review of tests), Obtaining and/or reviewing separately obtained history, Documenting clinical information in the electronic or other health record, Independently interpreting results (not separately reported) and communicating results to the patient/family/caregiver, or Care coordination (not separately reported).          Disclaimer: This note was prepared using a voice recognition system and is likely to have sound alike errors within the text.       Betsy Figueroa PA-C  Rheumatology Department   [facility]

## 2023-03-24 ENCOUNTER — OFFICE VISIT (OUTPATIENT)
Dept: FAMILY MEDICINE | Facility: CLINIC | Age: 73
End: 2023-03-24
Payer: MEDICARE

## 2023-03-24 VITALS
BODY MASS INDEX: 21.48 KG/M2 | WEIGHT: 125.81 LBS | HEART RATE: 77 BPM | SYSTOLIC BLOOD PRESSURE: 115 MMHG | HEIGHT: 64 IN | OXYGEN SATURATION: 100 % | TEMPERATURE: 98 F | DIASTOLIC BLOOD PRESSURE: 71 MMHG

## 2023-03-24 DIAGNOSIS — J06.9 UPPER RESPIRATORY TRACT INFECTION, UNSPECIFIED TYPE: Primary | ICD-10-CM

## 2023-03-24 DIAGNOSIS — R05.9 COUGH, UNSPECIFIED TYPE: ICD-10-CM

## 2023-03-24 LAB
CTP QC/QA: YES
CTP QC/QA: YES
POC MOLECULAR INFLUENZA A AGN: NEGATIVE
POC MOLECULAR INFLUENZA B AGN: NEGATIVE
SARS-COV-2 RDRP RESP QL NAA+PROBE: NEGATIVE

## 2023-03-24 PROCEDURE — 99999 PR PBB SHADOW E&M-EST. PATIENT-LVL IV: ICD-10-PCS | Mod: PBBFAC,,, | Performed by: NURSE PRACTITIONER

## 2023-03-24 PROCEDURE — 1159F PR MEDICATION LIST DOCUMENTED IN MEDICAL RECORD: ICD-10-PCS | Mod: CPTII,S$GLB,, | Performed by: NURSE PRACTITIONER

## 2023-03-24 PROCEDURE — 3078F DIAST BP <80 MM HG: CPT | Mod: CPTII,S$GLB,, | Performed by: NURSE PRACTITIONER

## 2023-03-24 PROCEDURE — 3008F BODY MASS INDEX DOCD: CPT | Mod: CPTII,S$GLB,, | Performed by: NURSE PRACTITIONER

## 2023-03-24 PROCEDURE — 3288F FALL RISK ASSESSMENT DOCD: CPT | Mod: CPTII,S$GLB,, | Performed by: NURSE PRACTITIONER

## 2023-03-24 PROCEDURE — 3288F PR FALLS RISK ASSESSMENT DOCUMENTED: ICD-10-PCS | Mod: CPTII,S$GLB,, | Performed by: NURSE PRACTITIONER

## 2023-03-24 PROCEDURE — 3074F SYST BP LT 130 MM HG: CPT | Mod: CPTII,S$GLB,, | Performed by: NURSE PRACTITIONER

## 2023-03-24 PROCEDURE — 87502 POCT INFLUENZA A/B MOLECULAR: ICD-10-PCS | Mod: QW,S$GLB,, | Performed by: NURSE PRACTITIONER

## 2023-03-24 PROCEDURE — 99213 OFFICE O/P EST LOW 20 MIN: CPT | Mod: S$GLB,,, | Performed by: NURSE PRACTITIONER

## 2023-03-24 PROCEDURE — 87635: ICD-10-PCS | Mod: QW,S$GLB,, | Performed by: NURSE PRACTITIONER

## 2023-03-24 PROCEDURE — 99999 PR PBB SHADOW E&M-EST. PATIENT-LVL IV: CPT | Mod: PBBFAC,,, | Performed by: NURSE PRACTITIONER

## 2023-03-24 PROCEDURE — 99213 PR OFFICE/OUTPT VISIT, EST, LEVL III, 20-29 MIN: ICD-10-PCS | Mod: S$GLB,,, | Performed by: NURSE PRACTITIONER

## 2023-03-24 PROCEDURE — 1160F RVW MEDS BY RX/DR IN RCRD: CPT | Mod: CPTII,S$GLB,, | Performed by: NURSE PRACTITIONER

## 2023-03-24 PROCEDURE — 87502 INFLUENZA DNA AMP PROBE: CPT | Mod: QW,S$GLB,, | Performed by: NURSE PRACTITIONER

## 2023-03-24 PROCEDURE — 1101F PT FALLS ASSESS-DOCD LE1/YR: CPT | Mod: CPTII,S$GLB,, | Performed by: NURSE PRACTITIONER

## 2023-03-24 PROCEDURE — 1101F PR PT FALLS ASSESS DOC 0-1 FALLS W/OUT INJ PAST YR: ICD-10-PCS | Mod: CPTII,S$GLB,, | Performed by: NURSE PRACTITIONER

## 2023-03-24 PROCEDURE — 3008F PR BODY MASS INDEX (BMI) DOCUMENTED: ICD-10-PCS | Mod: CPTII,S$GLB,, | Performed by: NURSE PRACTITIONER

## 2023-03-24 PROCEDURE — 3074F PR MOST RECENT SYSTOLIC BLOOD PRESSURE < 130 MM HG: ICD-10-PCS | Mod: CPTII,S$GLB,, | Performed by: NURSE PRACTITIONER

## 2023-03-24 PROCEDURE — 1160F PR REVIEW ALL MEDS BY PRESCRIBER/CLIN PHARMACIST DOCUMENTED: ICD-10-PCS | Mod: CPTII,S$GLB,, | Performed by: NURSE PRACTITIONER

## 2023-03-24 PROCEDURE — 1159F MED LIST DOCD IN RCRD: CPT | Mod: CPTII,S$GLB,, | Performed by: NURSE PRACTITIONER

## 2023-03-24 PROCEDURE — 87635 SARS-COV-2 COVID-19 AMP PRB: CPT | Mod: QW,S$GLB,, | Performed by: NURSE PRACTITIONER

## 2023-03-24 PROCEDURE — 3078F PR MOST RECENT DIASTOLIC BLOOD PRESSURE < 80 MM HG: ICD-10-PCS | Mod: CPTII,S$GLB,, | Performed by: NURSE PRACTITIONER

## 2023-03-24 RX ORDER — CEPHALEXIN 500 MG/1
500 CAPSULE ORAL EVERY 12 HOURS
Qty: 14 CAPSULE | Refills: 0 | Status: SHIPPED | OUTPATIENT
Start: 2023-03-24 | End: 2023-03-31

## 2023-03-24 RX ORDER — BENZONATATE 200 MG/1
200 CAPSULE ORAL 3 TIMES DAILY PRN
Qty: 30 CAPSULE | Refills: 0 | Status: SHIPPED | OUTPATIENT
Start: 2023-03-24 | End: 2023-04-03

## 2023-04-12 ENCOUNTER — LAB VISIT (OUTPATIENT)
Dept: LAB | Facility: HOSPITAL | Age: 73
End: 2023-04-12
Attending: PHYSICIAN ASSISTANT
Payer: MEDICARE

## 2023-04-12 DIAGNOSIS — M32.19 OTHER SYSTEMIC LUPUS ERYTHEMATOSUS WITH OTHER ORGAN INVOLVEMENT: ICD-10-CM

## 2023-04-12 LAB
ALBUMIN SERPL BCP-MCNC: 3.8 G/DL (ref 3.5–5.2)
ALP SERPL-CCNC: 48 U/L (ref 55–135)
ALT SERPL W/O P-5'-P-CCNC: 11 U/L (ref 10–44)
ANION GAP SERPL CALC-SCNC: 11 MMOL/L (ref 8–16)
AST SERPL-CCNC: 21 U/L (ref 10–40)
BASOPHILS # BLD AUTO: 0.02 K/UL (ref 0–0.2)
BASOPHILS NFR BLD: 0.4 % (ref 0–1.9)
BILIRUB SERPL-MCNC: 1.4 MG/DL (ref 0.1–1)
BUN SERPL-MCNC: 10 MG/DL (ref 8–23)
C3 SERPL-MCNC: 125 MG/DL (ref 50–180)
C4 SERPL-MCNC: 31 MG/DL (ref 11–44)
CALCIUM SERPL-MCNC: 9.2 MG/DL (ref 8.7–10.5)
CHLORIDE SERPL-SCNC: 107 MMOL/L (ref 95–110)
CO2 SERPL-SCNC: 24 MMOL/L (ref 23–29)
CREAT SERPL-MCNC: 0.8 MG/DL (ref 0.5–1.4)
CRP SERPL-MCNC: 2.1 MG/L (ref 0–8.2)
DIFFERENTIAL METHOD: ABNORMAL
EOSINOPHIL # BLD AUTO: 0.1 K/UL (ref 0–0.5)
EOSINOPHIL NFR BLD: 1 % (ref 0–8)
ERYTHROCYTE [DISTWIDTH] IN BLOOD BY AUTOMATED COUNT: 12.4 % (ref 11.5–14.5)
ERYTHROCYTE [SEDIMENTATION RATE] IN BLOOD BY WESTERGREN METHOD: 9 MM/HR (ref 0–20)
EST. GFR  (NO RACE VARIABLE): >60 ML/MIN/1.73 M^2
GLUCOSE SERPL-MCNC: 86 MG/DL (ref 70–110)
HCT VFR BLD AUTO: 42.5 % (ref 37–48.5)
HGB BLD-MCNC: 13.7 G/DL (ref 12–16)
IMM GRANULOCYTES # BLD AUTO: 0 K/UL (ref 0–0.04)
IMM GRANULOCYTES NFR BLD AUTO: 0 % (ref 0–0.5)
LYMPHOCYTES # BLD AUTO: 2.3 K/UL (ref 1–4.8)
LYMPHOCYTES NFR BLD: 46.3 % (ref 18–48)
MCH RBC QN AUTO: 31.6 PG (ref 27–31)
MCHC RBC AUTO-ENTMCNC: 32.2 G/DL (ref 32–36)
MCV RBC AUTO: 98 FL (ref 82–98)
MONOCYTES # BLD AUTO: 0.6 K/UL (ref 0.3–1)
MONOCYTES NFR BLD: 11.6 % (ref 4–15)
NEUTROPHILS # BLD AUTO: 2 K/UL (ref 1.8–7.7)
NEUTROPHILS NFR BLD: 40.7 % (ref 38–73)
NRBC BLD-RTO: 0 /100 WBC
PLATELET # BLD AUTO: 204 K/UL (ref 150–450)
PMV BLD AUTO: 8.7 FL (ref 9.2–12.9)
POTASSIUM SERPL-SCNC: 4.3 MMOL/L (ref 3.5–5.1)
PROT SERPL-MCNC: 7.9 G/DL (ref 6–8.4)
RBC # BLD AUTO: 4.33 M/UL (ref 4–5.4)
SODIUM SERPL-SCNC: 142 MMOL/L (ref 136–145)
WBC # BLD AUTO: 4.9 K/UL (ref 3.9–12.7)

## 2023-04-12 PROCEDURE — 85025 COMPLETE CBC W/AUTO DIFF WBC: CPT | Mod: PO | Performed by: PHYSICIAN ASSISTANT

## 2023-04-12 PROCEDURE — 80053 COMPREHEN METABOLIC PANEL: CPT | Performed by: PHYSICIAN ASSISTANT

## 2023-04-12 PROCEDURE — 85651 RBC SED RATE NONAUTOMATED: CPT | Mod: PO | Performed by: PHYSICIAN ASSISTANT

## 2023-04-12 PROCEDURE — 86160 COMPLEMENT ANTIGEN: CPT | Mod: 59 | Performed by: PHYSICIAN ASSISTANT

## 2023-04-12 PROCEDURE — 86038 ANTINUCLEAR ANTIBODIES: CPT | Performed by: PHYSICIAN ASSISTANT

## 2023-04-12 PROCEDURE — 86160 COMPLEMENT ANTIGEN: CPT | Performed by: PHYSICIAN ASSISTANT

## 2023-04-12 PROCEDURE — 86140 C-REACTIVE PROTEIN: CPT | Performed by: PHYSICIAN ASSISTANT

## 2023-04-12 PROCEDURE — 86225 DNA ANTIBODY NATIVE: CPT | Performed by: PHYSICIAN ASSISTANT

## 2023-04-13 LAB
ANA SER QL IF: NORMAL
DSDNA AB SER-ACNC: NORMAL [IU]/ML

## 2023-04-14 ENCOUNTER — TELEPHONE (OUTPATIENT)
Dept: RHEUMATOLOGY | Facility: CLINIC | Age: 73
End: 2023-04-14
Payer: MEDICARE

## 2023-04-14 ENCOUNTER — INFUSION (OUTPATIENT)
Dept: INFUSION THERAPY | Facility: HOSPITAL | Age: 73
End: 2023-04-14
Attending: PHYSICIAN ASSISTANT
Payer: MEDICARE

## 2023-04-14 VITALS
TEMPERATURE: 97 F | DIASTOLIC BLOOD PRESSURE: 75 MMHG | RESPIRATION RATE: 18 BRPM | SYSTOLIC BLOOD PRESSURE: 132 MMHG | HEART RATE: 68 BPM | OXYGEN SATURATION: 99 %

## 2023-04-14 DIAGNOSIS — M85.80 OSTEOPENIA WITH HIGH RISK OF FRACTURE: Primary | ICD-10-CM

## 2023-04-14 PROCEDURE — 63600175 PHARM REV CODE 636 W HCPCS: Mod: JZ,JG | Performed by: PHYSICIAN ASSISTANT

## 2023-04-14 PROCEDURE — 96372 THER/PROPH/DIAG INJ SC/IM: CPT

## 2023-04-14 RX ADMIN — DENOSUMAB 60 MG: 60 INJECTION SUBCUTANEOUS at 10:04

## 2023-04-14 NOTE — NURSING
Last dose? 10/14/2022     Pt taking calcium/vitamin D supplements? yes     Any invasive dental procedures in past or upcoming 3 months? no     Last Rheumatology provider visit? 2/28/23. OLAMIDE Mcknight     Recent labs? 4/12/23 Calcium 9.2    Prolia 60mg administered SQ in L upper arm.  Injection given w/o difficulties. Bandaid applied.   Patient instructed to stay in the clinic for 15 minutes.   Patient educated on s&s of reaction. Verbalized understanding.  NAD upon discharge.    Follow up visit? 10/17/23

## 2023-04-14 NOTE — TELEPHONE ENCOUNTER
Attempted to contact pt on both numbers listed on file,     Calls was unsuccessful, vm not set up

## 2023-04-14 NOTE — TELEPHONE ENCOUNTER
----- Message from Betsy Figueroa PA-C sent at 4/14/2023  9:51 AM CDT -----  Please let her know labs look good.

## 2023-04-28 ENCOUNTER — PES CALL (OUTPATIENT)
Dept: ADMINISTRATIVE | Facility: CLINIC | Age: 73
End: 2023-04-28
Payer: MEDICARE

## 2023-06-21 ENCOUNTER — PES CALL (OUTPATIENT)
Dept: ADMINISTRATIVE | Facility: CLINIC | Age: 73
End: 2023-06-21
Payer: MEDICARE

## 2023-10-10 ENCOUNTER — LAB VISIT (OUTPATIENT)
Dept: LAB | Facility: HOSPITAL | Age: 73
End: 2023-10-10
Attending: FAMILY MEDICINE
Payer: MEDICARE

## 2023-10-10 ENCOUNTER — OFFICE VISIT (OUTPATIENT)
Dept: FAMILY MEDICINE | Facility: CLINIC | Age: 73
End: 2023-10-10
Payer: MEDICARE

## 2023-10-10 VITALS
TEMPERATURE: 97 F | SYSTOLIC BLOOD PRESSURE: 138 MMHG | HEIGHT: 64 IN | WEIGHT: 132.63 LBS | HEART RATE: 70 BPM | BODY MASS INDEX: 22.64 KG/M2 | DIASTOLIC BLOOD PRESSURE: 69 MMHG

## 2023-10-10 DIAGNOSIS — M15.9 PRIMARY OSTEOARTHRITIS INVOLVING MULTIPLE JOINTS: ICD-10-CM

## 2023-10-10 DIAGNOSIS — M32.19 OTHER SYSTEMIC LUPUS ERYTHEMATOSUS WITH OTHER ORGAN INVOLVEMENT: ICD-10-CM

## 2023-10-10 DIAGNOSIS — R55 SYNCOPE, UNSPECIFIED SYNCOPE TYPE: Primary | ICD-10-CM

## 2023-10-10 DIAGNOSIS — D84.9 IMMUNOCOMPROMISED: ICD-10-CM

## 2023-10-10 DIAGNOSIS — M85.89 OSTEOPENIA OF MULTIPLE SITES: ICD-10-CM

## 2023-10-10 DIAGNOSIS — Z51.81 MEDICATION MONITORING ENCOUNTER: ICD-10-CM

## 2023-10-10 DIAGNOSIS — M32.8 OTHER FORMS OF SYSTEMIC LUPUS ERYTHEMATOSUS, UNSPECIFIED ORGAN INVOLVEMENT STATUS: ICD-10-CM

## 2023-10-10 DIAGNOSIS — I73.00 RAYNAUD'S PHENOMENON WITHOUT GANGRENE: ICD-10-CM

## 2023-10-10 LAB
BASOPHILS # BLD AUTO: 0.04 K/UL (ref 0–0.2)
BASOPHILS NFR BLD: 0.6 % (ref 0–1.9)
DIFFERENTIAL METHOD: ABNORMAL
EOSINOPHIL # BLD AUTO: 0.1 K/UL (ref 0–0.5)
EOSINOPHIL NFR BLD: 1 % (ref 0–8)
ERYTHROCYTE [DISTWIDTH] IN BLOOD BY AUTOMATED COUNT: 12.3 % (ref 11.5–14.5)
ERYTHROCYTE [SEDIMENTATION RATE] IN BLOOD BY WESTERGREN METHOD: 14 MM/HR (ref 0–20)
HCT VFR BLD AUTO: 45.4 % (ref 37–48.5)
HGB BLD-MCNC: 14.7 G/DL (ref 12–16)
IMM GRANULOCYTES # BLD AUTO: 0.01 K/UL (ref 0–0.04)
IMM GRANULOCYTES NFR BLD AUTO: 0.1 % (ref 0–0.5)
LYMPHOCYTES # BLD AUTO: 2.4 K/UL (ref 1–4.8)
LYMPHOCYTES NFR BLD: 35.5 % (ref 18–48)
MCH RBC QN AUTO: 31.2 PG (ref 27–31)
MCHC RBC AUTO-ENTMCNC: 32.4 G/DL (ref 32–36)
MCV RBC AUTO: 96 FL (ref 82–98)
MONOCYTES # BLD AUTO: 0.7 K/UL (ref 0.3–1)
MONOCYTES NFR BLD: 9.9 % (ref 4–15)
NEUTROPHILS # BLD AUTO: 3.6 K/UL (ref 1.8–7.7)
NEUTROPHILS NFR BLD: 53 % (ref 38–73)
NRBC BLD-RTO: 0 /100 WBC
PLATELET # BLD AUTO: 236 K/UL (ref 150–450)
PMV BLD AUTO: 9.3 FL (ref 9.2–12.9)
RBC # BLD AUTO: 4.71 M/UL (ref 4–5.4)
WBC # BLD AUTO: 6.85 K/UL (ref 3.9–12.7)

## 2023-10-10 PROCEDURE — 3075F SYST BP GE 130 - 139MM HG: CPT | Mod: CPTII,S$GLB,, | Performed by: FAMILY MEDICINE

## 2023-10-10 PROCEDURE — 36415 COLL VENOUS BLD VENIPUNCTURE: CPT | Mod: PO | Performed by: PHYSICIAN ASSISTANT

## 2023-10-10 PROCEDURE — 85651 RBC SED RATE NONAUTOMATED: CPT | Mod: PO | Performed by: PHYSICIAN ASSISTANT

## 2023-10-10 PROCEDURE — 90694 VACC AIIV4 NO PRSRV 0.5ML IM: CPT | Mod: S$GLB,,, | Performed by: FAMILY MEDICINE

## 2023-10-10 PROCEDURE — 81002 URINALYSIS NONAUTO W/O SCOPE: CPT | Mod: PO | Performed by: PHYSICIAN ASSISTANT

## 2023-10-10 PROCEDURE — 1125F AMNT PAIN NOTED PAIN PRSNT: CPT | Mod: CPTII,S$GLB,, | Performed by: FAMILY MEDICINE

## 2023-10-10 PROCEDURE — 1101F PT FALLS ASSESS-DOCD LE1/YR: CPT | Mod: CPTII,S$GLB,, | Performed by: FAMILY MEDICINE

## 2023-10-10 PROCEDURE — 99999 PR PBB SHADOW E&M-EST. PATIENT-LVL IV: CPT | Mod: PBBFAC,,, | Performed by: FAMILY MEDICINE

## 2023-10-10 PROCEDURE — 3075F PR MOST RECENT SYSTOLIC BLOOD PRESS GE 130-139MM HG: ICD-10-PCS | Mod: CPTII,S$GLB,, | Performed by: FAMILY MEDICINE

## 2023-10-10 PROCEDURE — 1159F PR MEDICATION LIST DOCUMENTED IN MEDICAL RECORD: ICD-10-PCS | Mod: CPTII,S$GLB,, | Performed by: FAMILY MEDICINE

## 2023-10-10 PROCEDURE — 86160 COMPLEMENT ANTIGEN: CPT | Performed by: PHYSICIAN ASSISTANT

## 2023-10-10 PROCEDURE — 86225 DNA ANTIBODY NATIVE: CPT | Performed by: PHYSICIAN ASSISTANT

## 2023-10-10 PROCEDURE — 3288F FALL RISK ASSESSMENT DOCD: CPT | Mod: CPTII,S$GLB,, | Performed by: FAMILY MEDICINE

## 2023-10-10 PROCEDURE — 86140 C-REACTIVE PROTEIN: CPT | Performed by: PHYSICIAN ASSISTANT

## 2023-10-10 PROCEDURE — 3078F PR MOST RECENT DIASTOLIC BLOOD PRESSURE < 80 MM HG: ICD-10-PCS | Mod: CPTII,S$GLB,, | Performed by: FAMILY MEDICINE

## 2023-10-10 PROCEDURE — 86038 ANTINUCLEAR ANTIBODIES: CPT | Performed by: PHYSICIAN ASSISTANT

## 2023-10-10 PROCEDURE — 1125F PR PAIN SEVERITY QUANTIFIED, PAIN PRESENT: ICD-10-PCS | Mod: CPTII,S$GLB,, | Performed by: FAMILY MEDICINE

## 2023-10-10 PROCEDURE — 99214 OFFICE O/P EST MOD 30 MIN: CPT | Mod: 25,S$GLB,, | Performed by: FAMILY MEDICINE

## 2023-10-10 PROCEDURE — 3008F BODY MASS INDEX DOCD: CPT | Mod: CPTII,S$GLB,, | Performed by: FAMILY MEDICINE

## 2023-10-10 PROCEDURE — 80053 COMPREHEN METABOLIC PANEL: CPT | Performed by: PHYSICIAN ASSISTANT

## 2023-10-10 PROCEDURE — G0008 FLU VACCINE - QUADRIVALENT - ADJUVANTED: ICD-10-PCS | Mod: S$GLB,,, | Performed by: FAMILY MEDICINE

## 2023-10-10 PROCEDURE — 3078F DIAST BP <80 MM HG: CPT | Mod: CPTII,S$GLB,, | Performed by: FAMILY MEDICINE

## 2023-10-10 PROCEDURE — 1159F MED LIST DOCD IN RCRD: CPT | Mod: CPTII,S$GLB,, | Performed by: FAMILY MEDICINE

## 2023-10-10 PROCEDURE — 90694 FLU VACCINE - QUADRIVALENT - ADJUVANTED: ICD-10-PCS | Mod: S$GLB,,, | Performed by: FAMILY MEDICINE

## 2023-10-10 PROCEDURE — 3288F PR FALLS RISK ASSESSMENT DOCUMENTED: ICD-10-PCS | Mod: CPTII,S$GLB,, | Performed by: FAMILY MEDICINE

## 2023-10-10 PROCEDURE — 85025 COMPLETE CBC W/AUTO DIFF WBC: CPT | Mod: PO | Performed by: PHYSICIAN ASSISTANT

## 2023-10-10 PROCEDURE — G0008 ADMIN INFLUENZA VIRUS VAC: HCPCS | Mod: S$GLB,,, | Performed by: FAMILY MEDICINE

## 2023-10-10 PROCEDURE — 99214 PR OFFICE/OUTPT VISIT, EST, LEVL IV, 30-39 MIN: ICD-10-PCS | Mod: 25,S$GLB,, | Performed by: FAMILY MEDICINE

## 2023-10-10 PROCEDURE — 99999 PR PBB SHADOW E&M-EST. PATIENT-LVL IV: ICD-10-PCS | Mod: PBBFAC,,, | Performed by: FAMILY MEDICINE

## 2023-10-10 PROCEDURE — 86160 COMPLEMENT ANTIGEN: CPT | Mod: 59 | Performed by: PHYSICIAN ASSISTANT

## 2023-10-10 PROCEDURE — 1101F PR PT FALLS ASSESS DOC 0-1 FALLS W/OUT INJ PAST YR: ICD-10-PCS | Mod: CPTII,S$GLB,, | Performed by: FAMILY MEDICINE

## 2023-10-10 PROCEDURE — 3008F PR BODY MASS INDEX (BMI) DOCUMENTED: ICD-10-PCS | Mod: CPTII,S$GLB,, | Performed by: FAMILY MEDICINE

## 2023-10-10 NOTE — PROGRESS NOTES
Patient presents for follow-up of a syncopal/near syncopal episode which occurred on October 6th.  She was at a fair sitting in the shade.  States sun started come out so she got up to sit somewhere else.  She felt dizzy and then passed out briefly.  She is not sure if she completely lost consciousness.  She denied any vertigo.  No chest pain shortness breath palpitations.  She states she had not eaten or drank anything except for coffee that morning and this was around 2:00 p.m..  She did go to the ER at [facility].  Evaluation there was unrevealing to include EKG, laboratory including cardiac enzymes.  Urine sample was Zofran.  She was given some IV fluid and discharge.  She denies any prior or recurrent symptoms.        Alice was seen today for follow-up.    Diagnoses and all orders for this visit:    Syncope, unspecified syncope type  -     Holter monitor - 24 hour; Future  -     Echo; Future    Other orders  -     Influenza - Quadrivalent (Adjuvanted)      Reviewed records from [place].  Do not skip meals.  Make sure stay hydrated.  Additional evaluation as above.  Follow-up as needed if any recurrent symptoms.  Do not skip meals.            Past Medical History:  Past Medical History:   Diagnosis Date    Avascular necrosis of hip     Left    Avascular necrosis of humeral head     Right    Cataract     Lumbar disc disease     Lupus     Osteoarthritis     Osteopenia     Osteoporosis     Raynaud phenomenon     Systemic lupus erythematosus     Tubular adenoma of colon 09/20/2016    Unspecified vitamin D deficiency      Past Surgical History:   Procedure Laterality Date    CHOLECYSTECTOMY      COLONOSCOPY N/A 9/20/2016    Procedure: COLONOSCOPY;  Surgeon: Aroldo Raphael MD;  Location: [facility];  Service: Endoscopy;  Laterality: N/A;    TOTAL HIP ARTHROPLASTY      Left     Review of patient's allergies indicates:   Allergen Reactions    No known drug allergies      Current Outpatient Medications on File  Prior to Visit   Medication Sig Dispense Refill    cyclobenzaprine (FLEXERIL) 10 MG tablet Take 1 tablet (10 mg total) by mouth every evening. 90 tablet 3    ergocalciferol (ERGOCALCIFEROL) 50,000 unit Cap Take 1 capsule (50,000 Units total) by mouth every 7 days. 12 capsule 3    hydrOXYchloroQUINE (PLAQUENIL) 200 mg tablet Take one tablet by  Mouth three times a week 36 tablet 3    LIDOcaine (LIDODERM) 5 % Place 1 patch onto the skin.      meclizine (ANTIVERT) 12.5 mg tablet Take 1 tablet (12.5 mg total) by mouth 2 (two) times daily as needed for Dizziness. 14 tablet 0    meloxicam (MOBIC) 15 MG tablet Take 1 tablet (15 mg total) by mouth once daily. As needed for pain 90 tablet 2    methocarbamoL (ROBAXIN) 500 MG Tab Take 500 mg by mouth 3 times daily as needed.      nystatin (MYCOSTATIN) powder Apply topically Twice daily. 60 g 3    vitamin E 400 UNIT capsule Take 1 capsule (400 Units total) by mouth once daily.      desonide (DESOWEN) 0.05 % cream Apply topically 2 (two) times daily. for 10 days 60 g 2    gabapentin (NEURONTIN) 300 MG capsule Take 1 capsule (300 mg total) by mouth 3 (three) times daily. 90 capsule 11    pravastatin (PRAVACHOL) 20 MG tablet Take 1 tablet (20 mg total) by mouth once daily. 90 tablet 3    triamcinolone acetonide 0.1% (KENALOG) 0.1 % cream Apply topically 2 (two) times daily as needed. Thin film to area on face 45 g 1     No current facility-administered medications on file prior to visit.     Social History     Socioeconomic History    Marital status:    Tobacco Use    Smoking status: Never    Smokeless tobacco: Never   Substance and Sexual Activity    Alcohol use: No    Drug use: No    Sexual activity: Not Currently     Family History   Problem Relation Age of Onset    Cervical cancer Mother     Heart attack Father 62    Breast cancer Sister            ROS:  GENERAL: No fever, chills,  or significant weight changes.   CARDIOVASCULAR: Denies chest pain, PND, orthopnea or  "reduced exercise tolerance.  ABDOMEN: Appetite fine. Denies diarrhea, abdominal pain, hematemesis or blood in stool.  URINARY: No flank pain, dysuria or hematuria.    Vitals:    10/10/23 1045   BP: 138/69   Pulse: 70   Temp: 97.2 °F (36.2 °C)   TempSrc: Temporal   Weight: 60.1 kg (132 lb 9.6 oz)   Height: 5' 4" (1.626 m)     Wt Readings from Last 3 Encounters:   10/10/23 60.1 kg (132 lb 9.6 oz)   03/24/23 57.1 kg (125 lb 12.8 oz)   02/28/23 61.7 kg (136 lb)       OBJECTIVE:   APPEARANCE: Well nourished, well developed, in no acute distress.    HEAD: Normocephalic.  Atraumatic.  No sinus tenderness.  EYES:   Right eye: Pupil reactive.  Conjunctiva clear.    Left eye: Pupil reactive.  Conjunctiva clear.  EOMI.    EARS: TM's intact. Light reflex normal. No retraction or perforation.    NOSE:  clear.  MOUTH & THROAT:  No pharyngeal erythema or exudate. No lesions.  NECK: Supple. No bruits.  No JVD.  No cervical lymphadenopathy.  No thyromegaly.    CHEST: Breath sounds clear bilaterally.  Normal respiratory effort  CARDIOVASCULAR: Normal rate.  Regular rhythm.  No murmurs.  No rub.  No gallops.  ABDOMEN: Bowel sounds normal.  Soft.  No tenderness.  No organomegaly.  PERIPHERAL VASCULAR: No cyanosis.  No clubbing.  No edema.  NEUROLOGIC: No focal findings.  MENTAL STATUS: Alert.  Oriented x 3.        "

## 2023-10-11 LAB
ALBUMIN SERPL BCP-MCNC: 4.1 G/DL (ref 3.5–5.2)
ALP SERPL-CCNC: 49 U/L (ref 55–135)
ALT SERPL W/O P-5'-P-CCNC: 10 U/L (ref 10–44)
ANA SER QL IF: NORMAL
ANION GAP SERPL CALC-SCNC: 9 MMOL/L (ref 8–16)
AST SERPL-CCNC: 20 U/L (ref 10–40)
BILIRUB SERPL-MCNC: 1.4 MG/DL (ref 0.1–1)
BUN SERPL-MCNC: 10 MG/DL (ref 8–23)
C3 SERPL-MCNC: 138 MG/DL (ref 50–180)
C4 SERPL-MCNC: 35 MG/DL (ref 11–44)
CALCIUM SERPL-MCNC: 9.3 MG/DL (ref 8.7–10.5)
CHLORIDE SERPL-SCNC: 110 MMOL/L (ref 95–110)
CO2 SERPL-SCNC: 24 MMOL/L (ref 23–29)
CREAT SERPL-MCNC: 0.8 MG/DL (ref 0.5–1.4)
CRP SERPL-MCNC: 2 MG/L (ref 0–8.2)
DSDNA AB SER-ACNC: NORMAL [IU]/ML
DSDNA AB SER-ACNC: NORMAL [IU]/ML
EST. GFR  (NO RACE VARIABLE): >60 ML/MIN/1.73 M^2
GLUCOSE SERPL-MCNC: 91 MG/DL (ref 70–110)
POTASSIUM SERPL-SCNC: 4.2 MMOL/L (ref 3.5–5.1)
PROT SERPL-MCNC: 8.6 G/DL (ref 6–8.4)
SODIUM SERPL-SCNC: 143 MMOL/L (ref 136–145)

## 2023-10-16 ENCOUNTER — TELEPHONE (OUTPATIENT)
Dept: RHEUMATOLOGY | Facility: CLINIC | Age: 73
End: 2023-10-16
Payer: MEDICARE

## 2023-10-16 ENCOUNTER — HOSPITAL ENCOUNTER (OUTPATIENT)
Dept: CARDIOLOGY | Facility: HOSPITAL | Age: 73
Discharge: HOME | End: 2023-10-16
Attending: FAMILY MEDICINE
Payer: MEDICARE

## 2023-10-16 DIAGNOSIS — M15.9 PRIMARY OSTEOARTHRITIS INVOLVING MULTIPLE JOINTS: ICD-10-CM

## 2023-10-16 DIAGNOSIS — R55 SYNCOPE, UNSPECIFIED SYNCOPE TYPE: ICD-10-CM

## 2023-10-16 DIAGNOSIS — M85.89 OSTEOPENIA OF MULTIPLE SITES: ICD-10-CM

## 2023-10-16 DIAGNOSIS — I73.00 RAYNAUD'S PHENOMENON WITHOUT GANGRENE: ICD-10-CM

## 2023-10-16 DIAGNOSIS — M32.19 OTHER SYSTEMIC LUPUS ERYTHEMATOSUS WITH OTHER ORGAN INVOLVEMENT: Primary | ICD-10-CM

## 2023-10-16 LAB
BILIRUB UR QL STRIP: NEGATIVE
CLARITY UR: CLEAR
COLOR UR: YELLOW
GLUCOSE UR QL STRIP: NEGATIVE
HGB UR QL STRIP: NEGATIVE
KETONES UR QL STRIP: NEGATIVE
LEUKOCYTE ESTERASE UR QL STRIP: NEGATIVE
NITRITE UR QL STRIP: NEGATIVE
PH UR STRIP: 7.5 [PH] (ref 5–8)
PROT UR QL STRIP: NEGATIVE
SP GR UR STRIP: 1.01 (ref 1–1.03)
URN SPEC COLLECT METH UR: NORMAL

## 2023-10-16 PROCEDURE — 93226 XTRNL ECG REC<48 HR SCAN A/R: CPT | Mod: PO

## 2023-10-16 PROCEDURE — 93227 XTRNL ECG REC<48 HR R&I: CPT | Mod: ,,, | Performed by: INTERNAL MEDICINE

## 2023-10-16 PROCEDURE — 93227 HOLTER MONITOR - 24 HOUR (CUPID ONLY): ICD-10-PCS | Mod: ,,, | Performed by: INTERNAL MEDICINE

## 2023-10-17 ENCOUNTER — INFUSION (OUTPATIENT)
Dept: INFUSION THERAPY | Facility: HOSPITAL | Age: 73
End: 2023-10-17
Attending: PHYSICIAN ASSISTANT
Payer: MEDICARE

## 2023-10-17 ENCOUNTER — LAB VISIT (OUTPATIENT)
Dept: LAB | Facility: HOSPITAL | Age: 73
End: 2023-10-17
Attending: PHYSICIAN ASSISTANT
Payer: MEDICARE

## 2023-10-17 ENCOUNTER — OFFICE VISIT (OUTPATIENT)
Dept: RHEUMATOLOGY | Facility: CLINIC | Age: 73
End: 2023-10-17
Payer: MEDICARE

## 2023-10-17 VITALS
BODY MASS INDEX: 23.26 KG/M2 | HEART RATE: 67 BPM | DIASTOLIC BLOOD PRESSURE: 79 MMHG | WEIGHT: 136.25 LBS | SYSTOLIC BLOOD PRESSURE: 152 MMHG | HEIGHT: 64 IN

## 2023-10-17 DIAGNOSIS — M51.36 DDD (DEGENERATIVE DISC DISEASE), LUMBAR: ICD-10-CM

## 2023-10-17 DIAGNOSIS — M87.022 AVASCULAR NECROSIS OF HUMERAL HEAD, LEFT: ICD-10-CM

## 2023-10-17 DIAGNOSIS — M15.9 PRIMARY OSTEOARTHRITIS INVOLVING MULTIPLE JOINTS: ICD-10-CM

## 2023-10-17 DIAGNOSIS — M85.80 OSTEOPENIA WITH HIGH RISK OF FRACTURE: Primary | ICD-10-CM

## 2023-10-17 DIAGNOSIS — M85.89 OSTEOPENIA OF MULTIPLE SITES: Primary | ICD-10-CM

## 2023-10-17 DIAGNOSIS — Z51.81 MEDICATION MONITORING ENCOUNTER: ICD-10-CM

## 2023-10-17 DIAGNOSIS — M32.19 OTHER SYSTEMIC LUPUS ERYTHEMATOSUS WITH OTHER ORGAN INVOLVEMENT: ICD-10-CM

## 2023-10-17 DIAGNOSIS — M85.89 OSTEOPENIA OF MULTIPLE SITES: ICD-10-CM

## 2023-10-17 DIAGNOSIS — E55.9 VITAMIN D DEFICIENCY: ICD-10-CM

## 2023-10-17 DIAGNOSIS — Z76.0 MEDICATION REFILL: ICD-10-CM

## 2023-10-17 DIAGNOSIS — I73.00 RAYNAUD'S PHENOMENON WITHOUT GANGRENE: ICD-10-CM

## 2023-10-17 LAB
CREAT UR-MCNC: 96 MG/DL (ref 15–325)
OHS CV EVENT MONITOR DAY: 0
OHS CV HOLTER LENGTH DECIMAL HOURS: 23.98
OHS CV HOLTER LENGTH HOURS: 23
OHS CV HOLTER LENGTH MINUTES: 59
OHS CV HOLTER SINUS AVERAGE HR: 70
OHS CV HOLTER SINUS MAX HR: 102
OHS CV HOLTER SINUS MIN HR: 56
PROT UR-MCNC: 7 MG/DL (ref 0–15)
PROT/CREAT UR: 0.07 MG/G{CREAT} (ref 0–0.2)

## 2023-10-17 PROCEDURE — 1126F AMNT PAIN NOTED NONE PRSNT: CPT | Mod: CPTII,S$GLB,, | Performed by: PHYSICIAN ASSISTANT

## 2023-10-17 PROCEDURE — 99999 PR PBB SHADOW E&M-EST. PATIENT-LVL III: CPT | Mod: PBBFAC,,, | Performed by: PHYSICIAN ASSISTANT

## 2023-10-17 PROCEDURE — 99214 PR OFFICE/OUTPT VISIT, EST, LEVL IV, 30-39 MIN: ICD-10-PCS | Mod: S$GLB,,, | Performed by: PHYSICIAN ASSISTANT

## 2023-10-17 PROCEDURE — 84156 ASSAY OF PROTEIN URINE: CPT | Performed by: PHYSICIAN ASSISTANT

## 2023-10-17 PROCEDURE — 1101F PT FALLS ASSESS-DOCD LE1/YR: CPT | Mod: CPTII,S$GLB,, | Performed by: PHYSICIAN ASSISTANT

## 2023-10-17 PROCEDURE — 96372 THER/PROPH/DIAG INJ SC/IM: CPT

## 2023-10-17 PROCEDURE — 99214 OFFICE O/P EST MOD 30 MIN: CPT | Mod: S$GLB,,, | Performed by: PHYSICIAN ASSISTANT

## 2023-10-17 PROCEDURE — 3288F PR FALLS RISK ASSESSMENT DOCUMENTED: ICD-10-PCS | Mod: CPTII,S$GLB,, | Performed by: PHYSICIAN ASSISTANT

## 2023-10-17 PROCEDURE — 3078F DIAST BP <80 MM HG: CPT | Mod: CPTII,S$GLB,, | Performed by: PHYSICIAN ASSISTANT

## 2023-10-17 PROCEDURE — 3078F PR MOST RECENT DIASTOLIC BLOOD PRESSURE < 80 MM HG: ICD-10-PCS | Mod: CPTII,S$GLB,, | Performed by: PHYSICIAN ASSISTANT

## 2023-10-17 PROCEDURE — 3288F FALL RISK ASSESSMENT DOCD: CPT | Mod: CPTII,S$GLB,, | Performed by: PHYSICIAN ASSISTANT

## 2023-10-17 PROCEDURE — 1126F PR PAIN SEVERITY QUANTIFIED, NO PAIN PRESENT: ICD-10-PCS | Mod: CPTII,S$GLB,, | Performed by: PHYSICIAN ASSISTANT

## 2023-10-17 PROCEDURE — 99999 PR PBB SHADOW E&M-EST. PATIENT-LVL III: ICD-10-PCS | Mod: PBBFAC,,, | Performed by: PHYSICIAN ASSISTANT

## 2023-10-17 PROCEDURE — 3008F BODY MASS INDEX DOCD: CPT | Mod: CPTII,S$GLB,, | Performed by: PHYSICIAN ASSISTANT

## 2023-10-17 PROCEDURE — 3077F SYST BP >= 140 MM HG: CPT | Mod: CPTII,S$GLB,, | Performed by: PHYSICIAN ASSISTANT

## 2023-10-17 PROCEDURE — 3008F PR BODY MASS INDEX (BMI) DOCUMENTED: ICD-10-PCS | Mod: CPTII,S$GLB,, | Performed by: PHYSICIAN ASSISTANT

## 2023-10-17 PROCEDURE — 63600175 PHARM REV CODE 636 W HCPCS: Mod: JZ,JG | Performed by: PHYSICIAN ASSISTANT

## 2023-10-17 PROCEDURE — 1101F PR PT FALLS ASSESS DOC 0-1 FALLS W/OUT INJ PAST YR: ICD-10-PCS | Mod: CPTII,S$GLB,, | Performed by: PHYSICIAN ASSISTANT

## 2023-10-17 PROCEDURE — 3077F PR MOST RECENT SYSTOLIC BLOOD PRESSURE >= 140 MM HG: ICD-10-PCS | Mod: CPTII,S$GLB,, | Performed by: PHYSICIAN ASSISTANT

## 2023-10-17 RX ORDER — ERGOCALCIFEROL 1.25 MG/1
50000 CAPSULE ORAL
Qty: 12 CAPSULE | Refills: 3 | Status: SHIPPED | OUTPATIENT
Start: 2023-10-17

## 2023-10-17 RX ORDER — CYCLOBENZAPRINE HCL 10 MG
10 TABLET ORAL NIGHTLY
Qty: 90 TABLET | Refills: 3 | Status: SHIPPED | OUTPATIENT
Start: 2023-10-17

## 2023-10-17 RX ORDER — HYDROXYCHLOROQUINE SULFATE 200 MG/1
TABLET, FILM COATED ORAL
Qty: 36 TABLET | Refills: 3 | Status: SHIPPED | OUTPATIENT
Start: 2023-10-17

## 2023-10-17 RX ADMIN — DENOSUMAB 60 MG: 60 INJECTION SUBCUTANEOUS at 11:10

## 2023-10-17 NOTE — PROGRESS NOTES
Subjective:       Patient ID: Alice Loza is a 73 y.o. female.    Chief Complaint: Disease Management, Osteoporosis, and Lupus    Alice Loza  is a 73 y.o. female seen today for follow-up osteoporosis as well as lupus.  She was diagnosed with lupus years ago.  Currently SLE is in remission but she has had significant problems in the past.  She is currently taking hydroxychloroquine 200 mg b.i.d. p.r.n..  No recent lupus flares.  Denies tender swollen joints.  Pain level is 0/10.  Also has Raynaud's.  It is stable on aspirin and vitamin E. no significant or prolonged attacks.  Last several ANAs have been negative as has the dsdna, complements have been normal. utd with eye checks    Patient denies alopecia, unexplained fevers, SOB, chest pain, lad, oral/fingers/nasal ulcerations, rash, sicca symptoms, progressive weakness, dysphagia.  Rheumatologic ROS otherwise negative    Also with history of osteoporosis.  Previously on Fosamax.  She was on a drug holiday until recently.  She was transitioned to Prolia with DEXA scan showing high FRAX.  She started Prolia October 2022.  Tolerates it well without any problems.  No fractures since last visit.  She has had a fall.  She a cane ambulation.      Current Tx:   Prolia - due today  Ergo 50k IU weekly  Previous Tx:  Fosamax  GERD: no   Gait:  unsteady - uses a cane  Dental:  None recent and none planned in the immediate future though she states she needs to consider having dental work done  History of Fragility fracture: no    Rheumatologic systems otherwise negative.      Current Outpatient Medications:     LIDOcaine (LIDODERM) 5 %, Place 1 patch onto the skin., Disp: , Rfl:     meclizine (ANTIVERT) 12.5 mg tablet, Take 1 tablet (12.5 mg total) by mouth 2 (two) times daily as needed for Dizziness., Disp: 14 tablet, Rfl: 0    meloxicam (MOBIC) 15 MG tablet, Take 1 tablet (15 mg total) by mouth once daily. As needed for pain, Disp: 90 tablet, Rfl: 2    nystatin  "(MYCOSTATIN) powder, Apply topically Twice daily., Disp: 60 g, Rfl: 3    vitamin E 400 UNIT capsule, Take 1 capsule (400 Units total) by mouth once daily., Disp: , Rfl:     cyclobenzaprine (FLEXERIL) 10 MG tablet, Take 1 tablet (10 mg total) by mouth every evening., Disp: 90 tablet, Rfl: 3    desonide (DESOWEN) 0.05 % cream, Apply topically 2 (two) times daily. for 10 days, Disp: 60 g, Rfl: 2    ergocalciferol (ERGOCALCIFEROL) 50,000 unit Cap, Take 1 capsule (50,000 Units total) by mouth every 7 days., Disp: 12 capsule, Rfl: 3    hydroxychloroquine (PLAQUENIL) 200 mg tablet, Take one tablet by  Mouth three times a week, Disp: 36 tablet, Rfl: 3    pravastatin (PRAVACHOL) 20 MG tablet, Take 1 tablet (20 mg total) by mouth once daily., Disp: 90 tablet, Rfl: 3    triamcinolone acetonide 0.1% (KENALOG) 0.1 % cream, Apply topically 2 (two) times daily as needed. Thin film to area on face, Disp: 45 g, Rfl: 1  No current facility-administered medications for this visit.  Past Medical History:   Diagnosis Date    Avascular necrosis of hip     Left    Avascular necrosis of humeral head     Right    Cataract     Lumbar disc disease     Lupus     Osteoarthritis     Osteopenia     Osteoporosis     Raynaud phenomenon     Systemic lupus erythematosus     Tubular adenoma of colon 09/20/2016    Unspecified vitamin D deficiency      Family History   Problem Relation Age of Onset    Cervical cancer Mother     Heart attack Father 62    Breast cancer Sister      Social History     Socioeconomic History    Marital status:    Tobacco Use    Smoking status: Never    Smokeless tobacco: Never   Substance and Sexual Activity    Alcohol use: No    Drug use: No    Sexual activity: Not Currently     Review of patient's allergies indicates:   Allergen Reactions    No known drug allergies        Objective:   BP (!) 152/79   Pulse 67   Ht 5' 4" (1.626 m)   Wt 61.8 kg (136 lb 3.9 oz)   BMI 23.39 kg/m²   Immunization History   Administered " Date(s) Administered    COVID-19, MRNA, LN-S, PF (MODERNA FULL 0.5 ML DOSE) 01/11/2021, 02/08/2021, 11/17/2021    Influenza (FLUAD) - Quadrivalent - Adjuvanted - PF *Preferred* (65+) 10/10/2023    Influenza - High Dose - PF (65 years and older) 10/13/2015, 10/25/2016, 02/04/2020    Influenza A (H1N1) 2009 Monovalent - IM 12/16/2009    Influenza Split 10/04/2013    Pneumococcal Conjugate - 13 Valent 10/15/2014    Pneumococcal Polysaccharide - 23 Valent 01/09/2002, 02/04/2020       Physical Exam   Constitutional: She is oriented to person, place, and time. No distress.   HENT:   Head: Normocephalic and atraumatic.   Pulmonary/Chest: Effort normal.   Abdominal: She exhibits no distension.   Musculoskeletal:         General: No swelling or tenderness. Normal range of motion.      Cervical back: Normal range of motion.   Lymphadenopathy:     She has no cervical adenopathy.   Neurological: She is alert and oriented to person, place, and time.   Skin: Skin is warm and dry. No rash noted.   Psychiatric: Mood normal.   Nursing note and vitals reviewed.    No synovitis, no dactylitis, no enthesitis  No effusions of large or small joints  100% fist formation  Well preserved ROM       Recent Results (from the past 336 hour(s))   Anti-DNA Ab, Double-Stranded    Collection Time: 10/10/23 11:39 AM   Result Value Ref Range    ds DNA Ab Negative 1:10 Negative 1:10   MILDRED Screen w/Reflex    Collection Time: 10/10/23 11:39 AM   Result Value Ref Range    MILDRED Screen Negative <1:80 Negative <1:80   Anti-DNA Ab, Double-Stranded    Collection Time: 10/10/23 11:39 AM   Result Value Ref Range    ds DNA Ab Negative 1:10 Negative 1:10   C3 Complement    Collection Time: 10/10/23 11:39 AM   Result Value Ref Range    Complement (C-3) 138 50 - 180 mg/dL   C4 Complement    Collection Time: 10/10/23 11:39 AM   Result Value Ref Range    Complement (C-4) 35 11 - 44 mg/dL   CBC Auto Differential    Collection Time: 10/10/23 11:39 AM   Result Value Ref  Range    WBC 6.85 3.90 - 12.70 K/uL    RBC 4.71 4.00 - 5.40 M/uL    Hemoglobin 14.7 12.0 - 16.0 g/dL    Hematocrit 45.4 37.0 - 48.5 %    MCV 96 82 - 98 fL    MCH 31.2 (H) 27.0 - 31.0 pg    MCHC 32.4 32.0 - 36.0 g/dL    RDW 12.3 11.5 - 14.5 %    Platelets 236 150 - 450 K/uL    MPV 9.3 9.2 - 12.9 fL    Immature Granulocytes 0.1 0.0 - 0.5 %    Gran # (ANC) 3.6 1.8 - 7.7 K/uL    Immature Grans (Abs) 0.01 0.00 - 0.04 K/uL    Lymph # 2.4 1.0 - 4.8 K/uL    Mono # 0.7 0.3 - 1.0 K/uL    Eos # 0.1 0.0 - 0.5 K/uL    Baso # 0.04 0.00 - 0.20 K/uL    nRBC 0 0 /100 WBC    Gran % 53.0 38.0 - 73.0 %    Lymph % 35.5 18.0 - 48.0 %    Mono % 9.9 4.0 - 15.0 %    Eosinophil % 1.0 0.0 - 8.0 %    Basophil % 0.6 0.0 - 1.9 %    Differential Method Automated    Sedimentation rate    Collection Time: 10/10/23 11:39 AM   Result Value Ref Range    Sed Rate 14 0 - 20 mm/Hr   C-Reactive Protein    Collection Time: 10/10/23 11:39 AM   Result Value Ref Range    CRP 2.0 0.0 - 8.2 mg/L   Comprehensive Metabolic Panel    Collection Time: 10/10/23 11:39 AM   Result Value Ref Range    Sodium 143 136 - 145 mmol/L    Potassium 4.2 3.5 - 5.1 mmol/L    Chloride 110 95 - 110 mmol/L    CO2 24 23 - 29 mmol/L    Glucose 91 70 - 110 mg/dL    BUN 10 8 - 23 mg/dL    Creatinine 0.8 0.5 - 1.4 mg/dL    Calcium 9.3 8.7 - 10.5 mg/dL    Total Protein 8.6 (H) 6.0 - 8.4 g/dL    Albumin 4.1 3.5 - 5.2 g/dL    Total Bilirubin 1.4 (H) 0.1 - 1.0 mg/dL    Alkaline Phosphatase 49 (L) 55 - 135 U/L    AST 20 10 - 40 U/L    ALT 10 10 - 44 U/L    eGFR >60.0 >60 mL/min/1.73 m^2    Anion Gap 9 8 - 16 mmol/L   Urinalysis    Collection Time: 10/16/23  3:45 PM   Result Value Ref Range    Specimen UA Urine, Clean Catch     Color, UA Yellow Yellow, Straw, Mary    Appearance, UA Clear Clear    pH, UA 7.5 5.0 - 8.0    Specific Gravity, UA 1.015 1.005 - 1.030    Protein, UA Negative Negative    Glucose, UA Negative Negative    Ketones, UA Negative Negative    Bilirubin (UA) Negative Negative  "   Occult Blood UA Negative Negative    Nitrite, UA Negative Negative    Leukocytes, UA Negative Negative       No results found for: "TBGOLDPLUS"   Lab Results   Component Value Date    HEPCAB Negative 01/10/2018        DEXA - I have personally reviewed results from 9/20/22   FINDINGS:  The L1 to L4 vertebral bone mineral density is equal to 1.215 g/cm squared with a T score of 0.3.  There has been no significant change relative to the prior study.     The left femoral neck bone mineral density is equal to 0.731 g/cm squared with a T score of -2.2.  There has been  no significant change relative to the prior study.     There is a 12.7% risk of a major osteoporotic fracture and a 3.3% risk of hip fracture in the next 10 years (FRAX).     Impression:  Osteopenia    Assessment:     1. Osteopenia of multiple sites    2. Medication monitoring encounter    3. Vitamin D deficiency    4. Other systemic lupus erythematosus with other organ involvement    5. Medication refill    6. Avascular necrosis of humeral head, left    7. DDD (degenerative disc disease), lumbar            Plan:     Alice was seen today for disease management, osteoporosis and lupus.    Diagnoses and all orders for this visit:    Osteopenia of multiple sites  -     Vitamin D; Standing    Medication monitoring encounter    Vitamin D deficiency  -     Vitamin D; Standing    Other systemic lupus erythematosus with other organ involvement  -     hydroxychloroquine (PLAQUENIL) 200 mg tablet; Take one tablet by  Mouth three times a week    Medication refill  -     ergocalciferol (ERGOCALCIFEROL) 50,000 unit Cap; Take 1 capsule (50,000 Units total) by mouth every 7 days.    Avascular necrosis of humeral head, left  -     cyclobenzaprine (FLEXERIL) 10 MG tablet; Take 1 tablet (10 mg total) by mouth every evening.    DDD (degenerative disc disease), lumbar        SLE in remission  No evidence active lupus on blood work  DsDNA remains negative, complements " within normal range, no thrombocytopenia, no leukopenia  Urinalysis is clear  Needs updated PC ratio.  Plan to do that today  Continue hydroxychloroquine 200 mg 3 times a week  Patient aware of the need for routine eye exams yearly  States she will call to schedule.  Osteopenia high FRAX  Calcium 9.6 - WNL  GFR > 60  Vit5 D 57 - 8/2022 - recheck next labs  No contra indication to Prolia today  Patient instructed to notify the office if he/she has any new falls or new fractures  Discussed risks associated w Prolia including but not limited to hypocalcemia, ONJ, AFF  DEXA due 9/2022  AVN humeral head and known degenerative changes in the lumbar spine.  Pain controlled by using Flexeril.  I have refilled it for her today.  Drug therapy requiring intensive monitoring for toxicity  High Risk Medication Monitoring encounter  No current medication related issues, no evidence of toxicity  I ordered labs for toxicity monitoring, have personally reviewed the findings, and discussed them with the patient.  Pending labs will be sent via the portal  Compromised immune system secondary to autoimmune disease and/or use of immunosuppressive drugs.  Monitor carefully for infections.  Advised patient to get immediate medical care if any infection arises.  Also advised strict adherence age-appropriate vaccinations and cancer screenings with PCP.  Patient advised to hold DMARD and/or biologic therapy for signs of infection or for surgery. If you are unsure what to do please call our office for instruction.[facility] [phone]  Return to clinic: 6mos me + Prolia - SLE labs prior w Vit D in [place]    No follow-ups on file.    The patient understands, chooses and consents to this plan and accepts all the risks which include but are not limited to the risks mentioned above.     Disclaimer: This note was prepared using a voice recognition system and is likely to have sound alike errors within the text.

## 2023-10-17 NOTE — DISCHARGE INSTRUCTIONS
Remember to take your calcium with vitamin D supplement as directed.   Do not have any dental work for 3 months following your injection today.     FALL PREVENTION   Falls often occur due to slipping, tripping or losing your balance. Here are ways to reduce your risk of falling again.   Was there anything that caused your fall that can be fixed, removed or replaced?   Make your home safe by keeping walkways clear of objects you may trip over.   Use non-slip pads under rugs.   Do not walk in poorly lit areas.   Do not stand on chairs or wobbly ladders.   Use caution when reaching overhead or looking upward. This position can cause a loss of balance.   Be sure your shoes fit properly, have non-slip bottoms and are in good condition.   Be cautious when going up and down stairs, curbs, and when walking on uneven sidewalks.   If your balance is poor, consider using a cane or walker.   If your fall was related to alcohol use, stop or limit alcohol intake.   If your fall was related to use of sleeping medicines, talk to your doctor about this. You may need to reduce your dosage at bedtime if you awaken during the night to go to the bathroom.   To reduce the need for nighttime bathroom trips:   Avoid drinking fluids for several hours before going to bed   Empty your bladder before going to bed   Men can keep a urinal at the bedside   © 0285-6227 [employer], [address] [place] [address], [place] [address]. All rights reserved. This information is not intended as a substitute for professional medical care. Always follow your healthcare professional's instructions.

## 2023-10-19 ENCOUNTER — HOSPITAL ENCOUNTER (OUTPATIENT)
Dept: CARDIOLOGY | Facility: HOSPITAL | Age: 73
Discharge: HOME | End: 2023-10-19
Attending: FAMILY MEDICINE
Payer: MEDICARE

## 2023-10-19 VITALS
WEIGHT: 136 LBS | DIASTOLIC BLOOD PRESSURE: 79 MMHG | HEIGHT: 64 IN | BODY MASS INDEX: 23.22 KG/M2 | SYSTOLIC BLOOD PRESSURE: 152 MMHG | HEART RATE: 71 BPM

## 2023-10-19 LAB
AORTIC ROOT ANNULUS: 2.42 CM
ASCENDING AORTA: 2.07 CM
AV INDEX (PROSTH): 0.69
AV MEAN GRADIENT: 4 MMHG
AV PEAK GRADIENT: 6 MMHG
AV VALVE AREA BY VELOCITY RATIO: 2.03 CM²
AV VALVE AREA: 2.17 CM²
AV VELOCITY RATIO: 0.65
BSA FOR ECHO PROCEDURE: 1.67 M2
CV ECHO LV RWT: 0.79 CM
DOP CALC AO PEAK VEL: 1.24 M/S
DOP CALC AO VTI: 29.4 CM
DOP CALC LVOT AREA: 3.1 CM2
DOP CALC LVOT DIAMETER: 2 CM
DOP CALC LVOT PEAK VEL: 0.8 M/S
DOP CALC LVOT STROKE VOLUME: 63.74 CM3
DOP CALC RVOT PEAK VEL: 1.01 M/S
DOP CALC RVOT VTI: 23.1 CM
DOP CALCLVOT PEAK VEL VTI: 20.3 CM
E WAVE DECELERATION TIME: 178.27 MSEC
E/A RATIO: 0.79
E/E' RATIO: 7.73 M/S
ECHO LV POSTERIOR WALL: 1.14 CM (ref 0.6–1.1)
EJECTION FRACTION: 60 %
FRACTIONAL SHORTENING: 41 % (ref 28–44)
INTERVENTRICULAR SEPTUM: 1.16 CM (ref 0.6–1.1)
IVC DIAMETER: 1.15 CM
IVRT: 79.92 MSEC
LA MAJOR: 6.35 CM
LA MINOR: 4.59 CM
LA WIDTH: 3 CM
LEFT ATRIUM SIZE: 3.45 CM
LEFT ATRIUM VOLUME INDEX MOD: 27.4 ML/M2
LEFT ATRIUM VOLUME INDEX: 28.2 ML/M2
LEFT ATRIUM VOLUME MOD: 45.53 CM3
LEFT ATRIUM VOLUME: 46.88 CM3
LEFT INTERNAL DIMENSION IN SYSTOLE: 1.72 CM (ref 2.1–4)
LEFT VENTRICLE DIASTOLIC VOLUME INDEX: 19.48 ML/M2
LEFT VENTRICLE DIASTOLIC VOLUME: 32.34 ML
LEFT VENTRICLE MASS INDEX: 59 G/M2
LEFT VENTRICLE SYSTOLIC VOLUME INDEX: 5.2 ML/M2
LEFT VENTRICLE SYSTOLIC VOLUME: 8.68 ML
LEFT VENTRICULAR INTERNAL DIMENSION IN DIASTOLE: 2.9 CM (ref 3.5–6)
LEFT VENTRICULAR MASS: 97.29 G
LV LATERAL E/E' RATIO: 7.08 M/S
LV SEPTAL E/E' RATIO: 8.5 M/S
LVOT MG: 1.54 MMHG
LVOT MV: 0.59 CM/S
MV PEAK A VEL: 1.08 M/S
MV PEAK E VEL: 0.85 M/S
MV STENOSIS PRESSURE HALF TIME: 51.7 MS
MV VALVE AREA P 1/2 METHOD: 4.26 CM2
PISA TR MAX VEL: 2.18 M/S
PULM VEIN S/D RATIO: 1.48
PV MEAN GRADIENT: 2 MMHG
PV PEAK D VEL: 0.46 M/S
PV PEAK GRADIENT: 8 MMHG
PV PEAK S VEL: 0.68 M/S
PV PEAK VELOCITY: 1.4 M/S
RA MAJOR: 4.73 CM
RA PRESSURE ESTIMATED: 3 MMHG
RA WIDTH: 3 CM
RIGHT VENTRICULAR END-DIASTOLIC DIMENSION: 3.22 CM
RV TB RVSP: 5 MMHG
STJ: 2.33 CM
TDI LATERAL: 0.12 M/S
TDI SEPTAL: 0.1 M/S
TDI: 0.11 M/S
TR MAX PG: 19 MMHG
TRICUSPID ANNULAR PLANE SYSTOLIC EXCURSION: 1.78 CM
TV REST PULMONARY ARTERY PRESSURE: 22 MMHG
Z-SCORE OF LEFT VENTRICULAR DIMENSION IN END DIASTOLE: -4.72
Z-SCORE OF LEFT VENTRICULAR DIMENSION IN END SYSTOLE: -4.08

## 2023-10-19 PROCEDURE — 93306 TTE W/DOPPLER COMPLETE: CPT | Mod: 26,,, | Performed by: INTERNAL MEDICINE

## 2023-10-19 PROCEDURE — 93306 ECHO (CUPID ONLY): ICD-10-PCS | Mod: 26,,, | Performed by: INTERNAL MEDICINE

## 2023-10-19 PROCEDURE — 93306 TTE W/DOPPLER COMPLETE: CPT | Mod: PO

## 2023-10-30 ENCOUNTER — TELEPHONE (OUTPATIENT)
Dept: RHEUMATOLOGY | Facility: CLINIC | Age: 73
End: 2023-10-30
Payer: MEDICARE

## 2023-10-30 NOTE — TELEPHONE ENCOUNTER
----- Message from Merry Jama PA-C sent at 10/17/2023  9:58 PM CDT -----  No evidence of elevated protein levels in her blood.  Please call patient to notify her.

## 2023-10-30 NOTE — TELEPHONE ENCOUNTER
Spoke to patient. No evidence of elevated protein levels in her blood.  Reminded her about her appointment in 04/2024.

## 2024-02-21 NOTE — PROGRESS NOTES
Subjective:       Patient ID: Alice Loza is a 72 y.o. female.    Chief Complaint: Cough (X2 days ) and Nasal Congestion    Cough  This is a new problem. The current episode started in the past 7 days. The problem has been unchanged. The problem occurs every few minutes. The cough is Productive of sputum. Associated symptoms include rhinorrhea. Pertinent negatives include no chest pain, chills, ear congestion, ear pain, fever, headaches, heartburn, hemoptysis, myalgias, nasal congestion, postnasal drip, rash, sore throat, shortness of breath, sweats, weight loss or wheezing. Nothing aggravates the symptoms. She has tried nothing for the symptoms. The treatment provided no relief. There is no history of asthma, bronchiectasis, bronchitis, COPD, emphysema, environmental allergies or pneumonia.   Past Medical History:   Diagnosis Date    Avascular necrosis of hip     Left    Avascular necrosis of humeral head     Right    Cataract     Lumbar disc disease     Lupus     Osteoarthritis     Osteopenia     Osteoporosis     Raynaud phenomenon     Systemic lupus erythematosus     Tubular adenoma of colon 09/20/2016    Unspecified vitamin D deficiency      Social History     Socioeconomic History    Marital status:    Tobacco Use    Smoking status: Never    Smokeless tobacco: Never   Substance and Sexual Activity    Alcohol use: No    Drug use: No    Sexual activity: Not Currently     Past Surgical History:   Procedure Laterality Date    CHOLECYSTECTOMY      COLONOSCOPY N/A 9/20/2016    Procedure: COLONOSCOPY;  Surgeon: Aroldo Raphael MD;  Location: [facility];  Service: Endoscopy;  Laterality: N/A;    TOTAL HIP ARTHROPLASTY      Left       Review of Systems   Constitutional: Negative.  Negative for chills, fever and weight loss.   HENT:  Positive for rhinorrhea. Negative for ear pain, postnasal drip and sore throat.    Eyes: Negative.    Respiratory:  Positive for cough. Negative for hemoptysis, shortness of  We sent insurance coverage review for Adderall and this was approved for Quantity Limits  Expires 12-  Pharmacy aware   breath and wheezing.    Cardiovascular: Negative.  Negative for chest pain.   Gastrointestinal: Negative.  Negative for heartburn.   Endocrine: Negative.    Genitourinary: Negative.    Musculoskeletal: Negative.  Negative for myalgias.   Integumentary:  Negative for rash. Negative.   Allergic/Immunologic: Negative.  Negative for environmental allergies.   Neurological: Negative.  Negative for headaches.   Psychiatric/Behavioral: Negative.         Objective:      Physical Exam  Vitals and nursing note reviewed.   Constitutional:       Appearance: Normal appearance.   HENT:      Head: Normocephalic.      Right Ear: Hearing, tympanic membrane, ear canal and external ear normal.      Left Ear: Hearing, tympanic membrane, ear canal and external ear normal.      Nose: Congestion and rhinorrhea present.      Mouth/Throat:      Mouth: Mucous membranes are moist.      Pharynx: Oropharynx is clear.   Eyes:      Conjunctiva/sclera: Conjunctivae normal.      Pupils: Pupils are equal, round, and reactive to light.   Cardiovascular:      Rate and Rhythm: Normal rate and regular rhythm.      Pulses: Normal pulses.      Heart sounds: Normal heart sounds.   Pulmonary:      Effort: Pulmonary effort is normal.      Breath sounds: Normal breath sounds.   Abdominal:      General: Bowel sounds are normal.      Palpations: Abdomen is soft.   Musculoskeletal:         General: Normal range of motion.      Cervical back: Normal range of motion and neck supple.   Skin:     General: Skin is warm and dry.      Capillary Refill: Capillary refill takes 2 to 3 seconds.   Neurological:      Mental Status: She is alert and oriented to person, place, and time.   Psychiatric:         Mood and Affect: Mood normal.         Behavior: Behavior normal.         Thought Content: Thought content normal.         Judgment: Judgment normal.       Assessment:       Problem List Items Addressed This Visit    None  Visit Diagnoses       Upper respiratory tract  infection, unspecified type    -  Primary    Cough, unspecified type        Relevant Orders    POCT COVID-19 Rapid Screening (Completed)    POCT Influenza A/B Molecular (Completed)              Plan:          Alice was seen today for cough and nasal congestion.    Diagnoses and all orders for this visit:    Upper respiratory tract infection, unspecified type  Cough, unspecified type  -     POCT COVID-19 Rapid Screening  -     POCT Influenza A/B Molecular        -     benzonatate (TESSALON) 200 MG capsule; Take 1 capsule (200 mg total) by mouth 3 (three) times daily as needed.  -     cephALEXin (KEFLEX) 500 MG capsule; Take 1 capsule (500 mg total) by mouth every 12 (twelve) hours. for 7 days  Hydrate well  Rest  Report to ER immediately if symptoms worsen or persist

## 2024-02-28 DIAGNOSIS — Z12.31 OTHER SCREENING MAMMOGRAM: ICD-10-CM

## 2024-04-22 ENCOUNTER — LAB VISIT (OUTPATIENT)
Dept: LAB | Facility: HOSPITAL | Age: 74
End: 2024-04-22
Attending: PHYSICIAN ASSISTANT
Payer: OTHER GOVERNMENT

## 2024-04-22 ENCOUNTER — OFFICE VISIT (OUTPATIENT)
Dept: RHEUMATOLOGY | Facility: CLINIC | Age: 74
End: 2024-04-22
Payer: OTHER GOVERNMENT

## 2024-04-22 ENCOUNTER — INFUSION (OUTPATIENT)
Dept: INFUSION THERAPY | Facility: HOSPITAL | Age: 74
End: 2024-04-22
Attending: PHYSICIAN ASSISTANT
Payer: OTHER GOVERNMENT

## 2024-04-22 VITALS
DIASTOLIC BLOOD PRESSURE: 77 MMHG | HEIGHT: 64 IN | BODY MASS INDEX: 24.01 KG/M2 | WEIGHT: 140.63 LBS | SYSTOLIC BLOOD PRESSURE: 149 MMHG | HEART RATE: 65 BPM

## 2024-04-22 VITALS — BODY MASS INDEX: 24.01 KG/M2 | HEIGHT: 64 IN | WEIGHT: 140.63 LBS

## 2024-04-22 DIAGNOSIS — M85.89 OSTEOPENIA OF MULTIPLE SITES: ICD-10-CM

## 2024-04-22 DIAGNOSIS — M87.019 AVASCULAR NECROSIS OF BONE OF SHOULDER: ICD-10-CM

## 2024-04-22 DIAGNOSIS — M15.9 PRIMARY OSTEOARTHRITIS INVOLVING MULTIPLE JOINTS: ICD-10-CM

## 2024-04-22 DIAGNOSIS — M32.19 OTHER SYSTEMIC LUPUS ERYTHEMATOSUS WITH OTHER ORGAN INVOLVEMENT: ICD-10-CM

## 2024-04-22 DIAGNOSIS — M85.80 OSTEOPENIA WITH HIGH RISK OF FRACTURE: Primary | ICD-10-CM

## 2024-04-22 DIAGNOSIS — M25.512 CHRONIC PAIN OF BOTH SHOULDERS: ICD-10-CM

## 2024-04-22 DIAGNOSIS — Z79.899 HIGH RISK MEDICATION USE: ICD-10-CM

## 2024-04-22 DIAGNOSIS — M32.19 OTHER SYSTEMIC LUPUS ERYTHEMATOSUS WITH OTHER ORGAN INVOLVEMENT: Primary | ICD-10-CM

## 2024-04-22 DIAGNOSIS — D84.9 IMMUNOCOMPROMISED: ICD-10-CM

## 2024-04-22 DIAGNOSIS — Z51.81 MEDICATION MONITORING ENCOUNTER: ICD-10-CM

## 2024-04-22 DIAGNOSIS — I73.00 RAYNAUD'S PHENOMENON WITHOUT GANGRENE: ICD-10-CM

## 2024-04-22 DIAGNOSIS — G89.29 CHRONIC PAIN OF BOTH SHOULDERS: ICD-10-CM

## 2024-04-22 DIAGNOSIS — E55.9 VITAMIN D DEFICIENCY: ICD-10-CM

## 2024-04-22 DIAGNOSIS — M25.511 CHRONIC PAIN OF BOTH SHOULDERS: ICD-10-CM

## 2024-04-22 LAB
25(OH)D3+25(OH)D2 SERPL-MCNC: 38 NG/ML (ref 30–96)
ALBUMIN SERPL BCP-MCNC: 3.9 G/DL (ref 3.5–5.2)
ALP SERPL-CCNC: 63 U/L (ref 55–135)
ALT SERPL W/O P-5'-P-CCNC: 9 U/L (ref 10–44)
ANION GAP SERPL CALC-SCNC: 9 MMOL/L (ref 8–16)
AST SERPL-CCNC: 17 U/L (ref 10–40)
BILIRUB SERPL-MCNC: 0.9 MG/DL (ref 0.1–1)
BUN SERPL-MCNC: 12 MG/DL (ref 8–23)
CALCIUM SERPL-MCNC: 9.4 MG/DL (ref 8.7–10.5)
CHLORIDE SERPL-SCNC: 106 MMOL/L (ref 95–110)
CO2 SERPL-SCNC: 26 MMOL/L (ref 23–29)
CREAT SERPL-MCNC: 0.9 MG/DL (ref 0.5–1.4)
CREAT UR-MCNC: 61 MG/DL (ref 15–325)
EST. GFR  (NO RACE VARIABLE): >60 ML/MIN/1.73 M^2
GLUCOSE SERPL-MCNC: 80 MG/DL (ref 70–110)
POTASSIUM SERPL-SCNC: 4.7 MMOL/L (ref 3.5–5.1)
PROT SERPL-MCNC: 8.1 G/DL (ref 6–8.4)
PROT UR-MCNC: <7 MG/DL (ref 0–15)
PROT/CREAT UR: NORMAL MG/G{CREAT} (ref 0–0.2)
SODIUM SERPL-SCNC: 141 MMOL/L (ref 136–145)

## 2024-04-22 PROCEDURE — 82306 VITAMIN D 25 HYDROXY: CPT | Performed by: PHYSICIAN ASSISTANT

## 2024-04-22 PROCEDURE — 99214 OFFICE O/P EST MOD 30 MIN: CPT | Mod: PBBFAC,25 | Performed by: PHYSICIAN ASSISTANT

## 2024-04-22 PROCEDURE — 63600175 PHARM REV CODE 636 W HCPCS: Mod: JZ,JG | Performed by: PHYSICIAN ASSISTANT

## 2024-04-22 PROCEDURE — 99999 PR PBB SHADOW E&M-EST. PATIENT-LVL IV: CPT | Mod: PBBFAC,,, | Performed by: PHYSICIAN ASSISTANT

## 2024-04-22 PROCEDURE — 36415 COLL VENOUS BLD VENIPUNCTURE: CPT | Mod: XB | Performed by: PHYSICIAN ASSISTANT

## 2024-04-22 PROCEDURE — 82570 ASSAY OF URINE CREATININE: CPT | Performed by: PHYSICIAN ASSISTANT

## 2024-04-22 PROCEDURE — 80053 COMPREHEN METABOLIC PANEL: CPT | Mod: 91 | Performed by: PHYSICIAN ASSISTANT

## 2024-04-22 PROCEDURE — 99214 OFFICE O/P EST MOD 30 MIN: CPT | Mod: S$PBB,,, | Performed by: PHYSICIAN ASSISTANT

## 2024-04-22 PROCEDURE — 96372 THER/PROPH/DIAG INJ SC/IM: CPT

## 2024-04-22 RX ADMIN — DENOSUMAB 60 MG: 60 INJECTION SUBCUTANEOUS at 01:04

## 2024-04-22 NOTE — DISCHARGE INSTRUCTIONS
Thank you for allowing me to care for you today,  CHIP HardyN, RN    [facility]  [address] [address]  [phone] phone     [phone] fax  Hours of Operation: Monday- Friday 7:00am- 5:30pm    FALL PREVENTION   Falls often occur due to slipping, tripping or losing your balance. Here are ways to reduce your risk of falling again.   Was there anything that caused your fall that can be fixed, removed or replaced?   Make your home safe by keeping walkways clear of objects you may trip over.   Use non-slip pads under rugs.   Do not walk in poorly lit areas.   Do not stand on chairs or wobbly ladders.   Use caution when reaching overhead or looking upward. This position can cause a loss of balance.   Be sure your shoes fit properly, have non-slip bottoms and are in good condition.   Be cautious when going up and down stairs, curbs, and when walking on uneven sidewalks.   If your balance is poor, consider using a cane or walker.   If your fall was related to alcohol use, stop or limit alcohol intake.   If your fall was related to use of sleeping medicines, talk to your doctor about this. You may need to reduce your dosage at bedtime if you awaken during the night to go to the bathroom.   To reduce the need for nighttime bathroom trips:   Avoid drinking fluids for several hours before going to bed   Empty your bladder before going to bed   Men can keep a urinal at the bedside   © 2935-1570 [facility], [address] [place] [address], [place] [address]. All rights reserved. This information is not intended as a substitute for professional medical care. Always follow your healthcare professional's instructions.

## 2024-04-22 NOTE — PROGRESS NOTES
Subjective:       Patient ID: Alice Loza is a 73 y.o. female.    Chief Complaint: Osteopenia (Multiple sites)    Alice Loza  is a 73 y.o. female seen today for follow-up osteoporosis as well as lupus.  She was diagnosed with lupus years ago.  Currently taking hydroxychloroquine 200 mg 3x /wk.  No recent lupus flares.  Denies tender swollen joints.  Also has Raynaud's.  It is stable on aspirin and vitamin E. no significant or prolonged attacks.  Last several ANAs have been negative as has the dsdna, complements have been normal.  She is over due for eye exam by > 1 yr.  Has been on HCQ about 10 yrs.    Patient denies alopecia, unexplained fevers, SOB, chest pain, lad, oral/fingers/nasal ulcerations, rash, sicca symptoms, progressive weakness, dysphagia.  Rheumatologic ROS otherwise negative    Also with history of osteoporosis.  Previously on Fosamax.    She was transitioned to Prolia with DEXA scan showing high FRAX.  She started Prolia October 2022.  Tolerates it well without any problems.  No fractures since last visit.  She has had a fall.  She uese a cane for ambulation.      He is complaint today is right shoulder pain.  She has history of avascular necrosis in both shoulders.  Has not had updated x-rays in more than 8 years.  Has not recently seen Orthopedics.  Shoulder becoming more painful with range of motion particularly overhead.  Denies radiating symptoms.  Pain level is 8/10.  Described as aching.    Current Tx:   Prolia - due today  Ergo 50k IU weekly  Previous Tx:  Fosamax  GERD: no   Gait:  unsteady - uses a cane  Dental:  None recent and none planned in the immediate future though she states she needs to consider having dental work done  History of Fragility fracture: no    Rheumatologic systems otherwise negative.      Current Outpatient Medications:     cyclobenzaprine (FLEXERIL) 10 MG tablet, Take 1 tablet (10 mg total) by mouth every evening., Disp: 90 tablet, Rfl: 3     ergocalciferol (ERGOCALCIFEROL) 50,000 unit Cap, Take 1 capsule (50,000 Units total) by mouth every 7 days., Disp: 12 capsule, Rfl: 3    hydroxychloroquine (PLAQUENIL) 200 mg tablet, Take one tablet by  Mouth three times a week, Disp: 36 tablet, Rfl: 3    LIDOcaine (LIDODERM) 5 %, Place 1 patch onto the skin., Disp: , Rfl:     meclizine (ANTIVERT) 12.5 mg tablet, Take 1 tablet (12.5 mg total) by mouth 2 (two) times daily as needed for Dizziness., Disp: 14 tablet, Rfl: 0    meloxicam (MOBIC) 15 MG tablet, Take 1 tablet (15 mg total) by mouth once daily. As needed for pain, Disp: 90 tablet, Rfl: 2    nystatin (MYCOSTATIN) powder, Apply topically Twice daily., Disp: 60 g, Rfl: 3    vitamin E 400 UNIT capsule, Take 1 capsule (400 Units total) by mouth once daily., Disp: , Rfl:     desonide (DESOWEN) 0.05 % cream, Apply topically 2 (two) times daily. for 10 days, Disp: 60 g, Rfl: 2    pravastatin (PRAVACHOL) 20 MG tablet, Take 1 tablet (20 mg total) by mouth once daily., Disp: 90 tablet, Rfl: 3    triamcinolone acetonide 0.1% (KENALOG) 0.1 % cream, Apply topically 2 (two) times daily as needed. Thin film to area on face, Disp: 45 g, Rfl: 1  Past Medical History:   Diagnosis Date    Avascular necrosis of hip     Left    Avascular necrosis of humeral head     Right    Cataract     Lumbar disc disease     Lupus     Osteoarthritis     Osteopenia     Osteoporosis     Raynaud phenomenon     Systemic lupus erythematosus     Tubular adenoma of colon 09/20/2016    Unspecified vitamin D deficiency      Family History   Problem Relation Name Age of Onset    Cervical cancer Mother      Heart attack Father  62    Breast cancer Sister       Social History     Socioeconomic History    Marital status:    Tobacco Use    Smoking status: Never    Smokeless tobacco: Never   Substance and Sexual Activity    Alcohol use: No    Drug use: No    Sexual activity: Not Currently     Review of patient's allergies indicates:   Allergen  "Reactions    No known drug allergies        Objective:   BP (!) 149/77   Pulse 65   Ht 5' 4" (1.626 m)   Wt 63.8 kg (140 lb 10.5 oz)   BMI 24.14 kg/m²   Immunization History   Administered Date(s) Administered    COVID-19, MRNA, LN-S, PF (MODERNA FULL 0.5 ML DOSE) 01/11/2021, 02/08/2021, 11/17/2021    Influenza (FLUAD) - Quadrivalent - Adjuvanted - PF *Preferred* (65+) 10/10/2023    Influenza - High Dose - PF (65 years and older) 10/13/2015, 10/25/2016, 02/04/2020    Influenza A (H1N1) 2009 Monovalent - IM 12/16/2009    Influenza Split 10/04/2013    Pneumococcal Conjugate - 13 Valent 10/15/2014    Pneumococcal Polysaccharide - 23 Valent 01/09/2002, 02/04/2020       Physical Exam   Constitutional: She is oriented to person, place, and time. No distress.   HENT:   Head: Normocephalic and atraumatic.   Pulmonary/Chest: Effort normal.   Abdominal: She exhibits no distension.   Musculoskeletal:         General: No swelling or tenderness. Normal range of motion.      Cervical back: Normal range of motion.   Lymphadenopathy:     She has no cervical adenopathy.   Neurological: She is alert and oriented to person, place, and time.   Skin: Skin is warm and dry. No rash noted.   Psychiatric: Mood normal.   Nursing note and vitals reviewed.    No synovitis, no dactylitis, no enthesitis  No effusions of large or small joints  100% fist formation  Well preserved ROM hands/elbows  Decreased ROM shoulders d/t pain     Recent Results (from the past 336 hour(s))   Comprehensive Metabolic Panel    Collection Time: 04/22/24 12:04 PM   Result Value Ref Range    Sodium 141 136 - 145 mmol/L    Potassium 4.7 3.5 - 5.1 mmol/L    Chloride 106 95 - 110 mmol/L    CO2 26 23 - 29 mmol/L    Glucose 80 70 - 110 mg/dL    BUN 12 8 - 23 mg/dL    Creatinine 0.9 0.5 - 1.4 mg/dL    Calcium 9.4 8.7 - 10.5 mg/dL    Total Protein 8.1 6.0 - 8.4 g/dL    Albumin 3.9 3.5 - 5.2 g/dL    Total Bilirubin 0.9 0.1 - 1.0 mg/dL    Alkaline Phosphatase 63 55 - 135 " "U/L    AST 17 10 - 40 U/L    ALT 9 (L) 10 - 44 U/L    eGFR >60 >60 mL/min/1.73 m^2    Anion Gap 9 8 - 16 mmol/L         No results found for: "TBGOLDPLUS"   Lab Results   Component Value Date    HEPCAB Negative 01/10/2018        DEXA - I have personally reviewed results from 9/20/22   FINDINGS:  The L1 to L4 vertebral bone mineral density is equal to 1.215 g/cm squared with a T score of 0.3.  There has been no significant change relative to the prior study.     The left femoral neck bone mineral density is equal to 0.731 g/cm squared with a T score of -2.2.  There has been  no significant change relative to the prior study.     There is a 12.7% risk of a major osteoporotic fracture and a 3.3% risk of hip fracture in the next 10 years (FRAX).     Impression:  Osteopenia    Assessment:     1. Other systemic lupus erythematosus with other organ involvement    2. Raynaud's phenomenon without gangrene    3. Medication monitoring encounter    4. High risk medication use    5. Immunocompromised    6. Osteopenia of multiple sites    7. Vitamin D deficiency    8. Avascular necrosis of bone of shoulder    9. Chronic pain of both shoulders            Plan:     Alice was seen today for osteopenia.    Diagnoses and all orders for this visit:    Other systemic lupus erythematosus with other organ involvement  -     Urinalysis  -     Protein/Creatinine Ratio, Urine  -     Comprehensive Metabolic Panel; Standing  -     CBC Auto Differential; Standing  -     Sedimentation rate; Standing  -     C-Reactive Protein; Standing  -     Anti-DNA Ab, Double-Stranded; Standing  -     C4 Complement; Standing  -     C3 Complement; Standing    Raynaud's phenomenon without gangrene  -     Urinalysis  -     Protein/Creatinine Ratio, Urine    Medication monitoring encounter    High risk medication use    Immunocompromised    Osteopenia of multiple sites  -     Urinalysis  -     Protein/Creatinine Ratio, Urine  -     Vitamin D  -     " Comprehensive Metabolic Panel; Standing    Vitamin D deficiency  -     Vitamin D    Avascular necrosis of bone of shoulder  -     X-Ray Shoulder 2 or more views Bilat; Future    Chronic pain of both shoulders  -     X-Ray Shoulder 2 or more views Bilat; Future    SLE in remission  CMP is stable  PC ratio and UA pending  Will send pt back to lab for remaining SLE labs today  Hold HCQ til she can get updated HVF exam done  About 10 years of therapy though she is now on low dose  Patient aware of the need for routine eye exams yearly  Message sent to Dr. Mcclain's office for scheduling  Ok to continue if no maculopathy  Osteopenia high FRAX  Calcium 9.4 - WNL  GFR > 60  Vit5 D pending  No contra indication to Prolia today  Patient instructed to notify the office if he/she has any new falls or new fractures  Discussed risks associated w Prolia including but not limited to hypocalcemia, ONJ, AFF  DEXA due after 9/20/2024  AVN humeral head w increasing shoulder pain  Xrays today  Recommend ortho eval  Drug therapy requiring intensive monitoring for toxicity  High Risk Medication Monitoring encounter  No current medication related issues, no evidence of toxicity  I ordered labs for toxicity monitoring, have personally reviewed the findings, and discussed them with the patient.  Pending labs will be sent via the portal  Compromised immune system secondary to autoimmune disease and/or use of immunosuppressive drugs.  Monitor carefully for infections.  Advised patient to get immediate medical care if any infection arises.  Also advised strict adherence age-appropriate vaccinations and cancer screenings with PCP.  Patient advised to hold DMARD and/or biologic therapy for signs of infection or for surgery. If you are unsure what to do please call our office for instruction.[facility] [phone]  Return to clinic: 6mos me + Prolia - SLE labs prior w Vit D in lr    No follow-ups on file.    The patient  understands, chooses and consents to this plan and accepts all the risks which include but are not limited to the risks mentioned above.     Disclaimer: This note was prepared using a voice recognition system and is likely to have sound alike errors within the text.

## 2024-04-22 NOTE — Clinical Note
She is over due for HVF exam by more than a year.  Please contact her to schedule an appt asap.  She is on low dose Plaquneil - will hold it til she can get the appropriate monitoring.

## 2024-04-22 NOTE — NURSING
1354: Prolia 60 mg q 6 months  Last dose given-10/17/23    Any invasive dental procedures in past 3 months or upcoming 3 months: No    Last Rheumatology provider visit- Seen by LITZY Jama PA-C on 04/22/24    Recent labs? Yes;  CKD pt needing repeat labs in 10 days-No   Lab Results   Component Value Date    CALCIUM 9.4 04/22/2024   9.4  Lab Results   Component Value Date    CREATININE 0.9 04/22/2024   0.9  Lab Results   Component Value Date    ESTGFRAFRICA >60.0 04/22/2022   >60  Lab Results   Component Value Date    EGFRNONAA >60.0 04/22/2022   N/A  Lab Results   Component Value Date    CPMTZDCT74RN 57 08/30/2022   N/A       Lot #- [id number]  Expiration Date- 06/30/26      Prolia 60 mg/ml administered SQ to Left upper arm. Tolerated without any complaints. No redness, swelling, or drainage noted to site. Instructed to remain in clinic 15 minutes after administration to monitor for any s/sx of reaction. Pt instructed on signs and symptoms of reaction to report. Verbalizes understanding.

## 2024-06-12 ENCOUNTER — TELEPHONE (OUTPATIENT)
Dept: FAMILY MEDICINE | Facility: CLINIC | Age: 74
End: 2024-06-12
Payer: OTHER GOVERNMENT

## 2024-06-12 NOTE — TELEPHONE ENCOUNTER
----- Message from Lew Sinha sent at 6/12/2024  3:05 PM CDT -----  Contact: Patient  Patient is calling requesting to know if she can get a covid booster in the office. Please call patient at .[phone]

## 2024-06-20 ENCOUNTER — OFFICE VISIT (OUTPATIENT)
Dept: OPHTHALMOLOGY | Facility: CLINIC | Age: 74
End: 2024-06-20
Payer: OTHER GOVERNMENT

## 2024-06-20 DIAGNOSIS — Z79.899 LONG-TERM USE OF PLAQUENIL: ICD-10-CM

## 2024-06-20 DIAGNOSIS — M32.8 OTHER FORMS OF SYSTEMIC LUPUS ERYTHEMATOSUS, UNSPECIFIED ORGAN INVOLVEMENT STATUS: Primary | ICD-10-CM

## 2024-06-20 PROCEDURE — 92014 COMPRE OPH EXAM EST PT 1/>: CPT | Mod: S$PBB,,, | Performed by: OPTOMETRIST

## 2024-06-20 PROCEDURE — 99999 PR PBB SHADOW E&M-EST. PATIENT-LVL III: CPT | Mod: PBBFAC,,, | Performed by: OPTOMETRIST

## 2024-06-20 PROCEDURE — 99213 OFFICE O/P EST LOW 20 MIN: CPT | Mod: PBBFAC | Performed by: OPTOMETRIST

## 2024-06-20 NOTE — PROGRESS NOTES
SUBJECTIVE  Alicelorna Loza is 73 y.o. female  Corrected distance visual acuity was 20/40 -2 in the right eye and 20/40 -2 in the left eye. Corrected near visual acuity was J5 in the right eye and J6 in the left eye.   Chief Complaint   Patient presents with    Eye Exam    Concerns About Ocular Health          HPI    Decrease night visual acuity.  Plaq check  Review 10-2 and MOCT.  Last eye exam 02/17/2022 DNL.  New patient to [facility].  Update glasses RX.  Last edited by Natalia Marroquin MA on 6/20/2024 10:30 AM.         Assessment /Plan :  1. Other forms of systemic lupus erythematosus, unspecified organ involvement status    2. Long-term use of Plaquenil    Poor results OD due to keratitis  Repeat VF mOCT Refraction in 2 months    AT qid OU x 2 months  RTC prn worsening symptoms

## 2024-08-22 ENCOUNTER — TELEPHONE (OUTPATIENT)
Dept: OPHTHALMOLOGY | Facility: CLINIC | Age: 74
End: 2024-08-22
Payer: OTHER GOVERNMENT

## 2024-08-22 NOTE — TELEPHONE ENCOUNTER
----- Message from Rex Valerio sent at 8/22/2024  8:10 AM CDT -----  Contact: [phone]  Type:  Sooner Apoointment Request    Caller is requesting a sooner appointment.  Caller declined first available appointment listed below.  Caller will not accept being placed on the waitlist and is requesting a message be sent to doctor.  Name of Caller:VICKIE COTTO [[id number]]  When is the first available appointment?next availability  Symptoms:VF 10-2 and Dense GOCT  Would the patient rather a call back or a response via [facility]? Call back  Best Call Back Number: [phone]  Additional Information: [id number]/ today the patient is not feeling well

## 2024-10-09 ENCOUNTER — TELEPHONE (OUTPATIENT)
Dept: RHEUMATOLOGY | Facility: CLINIC | Age: 74
End: 2024-10-09
Payer: OTHER GOVERNMENT

## 2024-10-09 NOTE — TELEPHONE ENCOUNTER
Patient was notified that her appointment with Dr. Petit on 10-23 has been cancelled because he'sno longer her. She was instructed to get her la work on 10-16 and come to the [facility] on 10-23 at 1;Oclock for her prolia, I let her know some one will call her to reschedule.She said  ok she understands.

## 2024-10-16 ENCOUNTER — LAB VISIT (OUTPATIENT)
Dept: LAB | Facility: HOSPITAL | Age: 74
End: 2024-10-16
Attending: FAMILY MEDICINE
Payer: OTHER GOVERNMENT

## 2024-10-16 DIAGNOSIS — E55.9 VITAMIN D DEFICIENCY: ICD-10-CM

## 2024-10-16 DIAGNOSIS — M85.89 OSTEOPENIA OF MULTIPLE SITES: ICD-10-CM

## 2024-10-16 DIAGNOSIS — E55.9 VITAMIN D DEFICIENCY: Primary | ICD-10-CM

## 2024-10-16 DIAGNOSIS — Z79.899 HIGH RISK MEDICATION USE: ICD-10-CM

## 2024-10-16 DIAGNOSIS — M32.19 OTHER SYSTEMIC LUPUS ERYTHEMATOSUS WITH OTHER ORGAN INVOLVEMENT: ICD-10-CM

## 2024-10-16 DIAGNOSIS — Z79.899 HIGH RISK MEDICATION USE: Primary | ICD-10-CM

## 2024-10-16 LAB
ALBUMIN SERPL BCP-MCNC: 3.9 G/DL (ref 3.5–5.2)
ALP SERPL-CCNC: 48 U/L (ref 55–135)
ALT SERPL W/O P-5'-P-CCNC: 10 U/L (ref 10–44)
ANION GAP SERPL CALC-SCNC: 8 MMOL/L (ref 8–16)
AST SERPL-CCNC: 20 U/L (ref 10–40)
BASOPHILS # BLD AUTO: 0.02 K/UL (ref 0–0.2)
BASOPHILS NFR BLD: 0.3 % (ref 0–1.9)
BILIRUB SERPL-MCNC: 0.9 MG/DL (ref 0.1–1)
BUN SERPL-MCNC: 14 MG/DL (ref 8–23)
C3 SERPL-MCNC: 131 MG/DL (ref 50–180)
C4 SERPL-MCNC: 31 MG/DL (ref 11–44)
CALCIUM SERPL-MCNC: 9.3 MG/DL (ref 8.7–10.5)
CHLORIDE SERPL-SCNC: 111 MMOL/L (ref 95–110)
CO2 SERPL-SCNC: 24 MMOL/L (ref 23–29)
CREAT SERPL-MCNC: 0.9 MG/DL (ref 0.5–1.4)
CRP SERPL-MCNC: 2.5 MG/L (ref 0–8.2)
DIFFERENTIAL METHOD BLD: ABNORMAL
EOSINOPHIL # BLD AUTO: 0.1 K/UL (ref 0–0.5)
EOSINOPHIL NFR BLD: 2.1 % (ref 0–8)
ERYTHROCYTE [DISTWIDTH] IN BLOOD BY AUTOMATED COUNT: 12.1 % (ref 11.5–14.5)
ERYTHROCYTE [SEDIMENTATION RATE] IN BLOOD BY WESTERGREN METHOD: 26 MM/HR (ref 0–20)
EST. GFR  (NO RACE VARIABLE): >60 ML/MIN/1.73 M^2
GLUCOSE SERPL-MCNC: 88 MG/DL (ref 70–110)
HCT VFR BLD AUTO: 45.1 % (ref 37–48.5)
HGB BLD-MCNC: 14.5 G/DL (ref 12–16)
IMM GRANULOCYTES # BLD AUTO: 0.01 K/UL (ref 0–0.04)
IMM GRANULOCYTES NFR BLD AUTO: 0.2 % (ref 0–0.5)
LYMPHOCYTES # BLD AUTO: 2.7 K/UL (ref 1–4.8)
LYMPHOCYTES NFR BLD: 46 % (ref 18–48)
MCH RBC QN AUTO: 31 PG (ref 27–31)
MCHC RBC AUTO-ENTMCNC: 32.2 G/DL (ref 32–36)
MCV RBC AUTO: 97 FL (ref 82–98)
MONOCYTES # BLD AUTO: 0.5 K/UL (ref 0.3–1)
MONOCYTES NFR BLD: 8.4 % (ref 4–15)
NEUTROPHILS # BLD AUTO: 2.5 K/UL (ref 1.8–7.7)
NEUTROPHILS NFR BLD: 43.2 % (ref 38–73)
NRBC BLD-RTO: 0 /100 WBC
PLATELET # BLD AUTO: 247 K/UL (ref 150–450)
PMV BLD AUTO: 8.5 FL (ref 9.2–12.9)
POTASSIUM SERPL-SCNC: 4.3 MMOL/L (ref 3.5–5.1)
PROT SERPL-MCNC: 8.5 G/DL (ref 6–8.4)
RBC # BLD AUTO: 4.67 M/UL (ref 4–5.4)
SODIUM SERPL-SCNC: 143 MMOL/L (ref 136–145)
WBC # BLD AUTO: 5.82 K/UL (ref 3.9–12.7)

## 2024-10-16 PROCEDURE — 36415 COLL VENOUS BLD VENIPUNCTURE: CPT | Mod: PO | Performed by: PHYSICIAN ASSISTANT

## 2024-10-16 PROCEDURE — 80053 COMPREHEN METABOLIC PANEL: CPT | Performed by: PHYSICIAN ASSISTANT

## 2024-10-16 PROCEDURE — 86160 COMPLEMENT ANTIGEN: CPT | Performed by: PHYSICIAN ASSISTANT

## 2024-10-16 PROCEDURE — 86140 C-REACTIVE PROTEIN: CPT | Performed by: PHYSICIAN ASSISTANT

## 2024-10-16 PROCEDURE — 86225 DNA ANTIBODY NATIVE: CPT | Performed by: PHYSICIAN ASSISTANT

## 2024-10-16 PROCEDURE — 86160 COMPLEMENT ANTIGEN: CPT | Mod: 59 | Performed by: PHYSICIAN ASSISTANT

## 2024-10-16 PROCEDURE — 85025 COMPLETE CBC W/AUTO DIFF WBC: CPT | Mod: PO | Performed by: PHYSICIAN ASSISTANT

## 2024-10-16 PROCEDURE — 85651 RBC SED RATE NONAUTOMATED: CPT | Mod: PO | Performed by: PHYSICIAN ASSISTANT

## 2024-10-17 LAB — DSDNA AB SER-ACNC: NORMAL [IU]/ML

## 2024-10-23 ENCOUNTER — INFUSION (OUTPATIENT)
Dept: INFUSION THERAPY | Facility: HOSPITAL | Age: 74
End: 2024-10-23
Attending: PHYSICIAN ASSISTANT
Payer: OTHER GOVERNMENT

## 2024-10-23 VITALS
OXYGEN SATURATION: 99 % | HEART RATE: 71 BPM | RESPIRATION RATE: 18 BRPM | DIASTOLIC BLOOD PRESSURE: 81 MMHG | SYSTOLIC BLOOD PRESSURE: 149 MMHG | TEMPERATURE: 98 F

## 2024-10-23 DIAGNOSIS — M85.80 OSTEOPENIA WITH HIGH RISK OF FRACTURE: Primary | ICD-10-CM

## 2024-10-23 PROCEDURE — 96372 THER/PROPH/DIAG INJ SC/IM: CPT

## 2024-10-23 PROCEDURE — 63600175 PHARM REV CODE 636 W HCPCS: Mod: JZ,JG | Performed by: STUDENT IN AN ORGANIZED HEALTH CARE EDUCATION/TRAINING PROGRAM

## 2024-10-23 RX ADMIN — DENOSUMAB 60 MG: 60 INJECTION SUBCUTANEOUS at 01:10

## 2024-10-23 NOTE — NURSING
1303: Prolia Injection given without difficulties.Bandaid applied. Patient instructed to stay in the clinic for 15 minutes. Patient verbalized understanding and will notify nurse with any complaints. Pt chose not to wait.

## 2024-10-23 NOTE — DISCHARGE INSTRUCTIONS
Thank you for allowing me to care for you today,  CHIP HardyN, RN    [facility]  [address] [address]  [phone] phone     [phone] fax  Hours of Operation: Monday- Friday 7:00am- 5:30pm  After hours phone  [phone]  Hematology / Oncology Physicians on call      [address] Dr., Dr., Dr., Dr., Dr., Dr., Dr., Dr., Dr., Dr., Dr., Dr., Dr., Dr., Dr., JYOTHI Littlejohn, JYOTHI Gonsalves, JYOTHI Moore, NP  Please call with any concerns regarding your appointment today.   FALL PREVENTION   Falls often occur due to slipping, tripping or losing your balance. Here are ways to reduce your risk of falling again.   Was there anything that caused your fall that can be fixed, removed or replaced?   Make your home safe by keeping walkways clear of objects you may trip over.   Use non-slip pads under rugs.   Do not walk in poorly lit areas.   Do not stand on chairs or wobbly ladders.   Use caution when reaching overhead or looking upward. This position can cause a loss of balance.   Be sure your shoes fit properly, have non-slip bottoms and are in good condition.   Be cautious when going up and down stairs, curbs, and when walking on uneven sidewalks.   If your balance is poor, consider using a cane or walker.   If your fall was related to alcohol use, stop or limit alcohol intake.   If your fall was related to use of sleeping medicines, talk to your doctor about this. You may need to reduce your dosage at bedtime if you awaken during the night to go to the bathroom.   To reduce the need for nighttime bathroom trips:   Avoid drinking fluids for several hours before going to bed   Empty your bladder before going to bed   Men can keep a urinal at the bedside   © 7432-4350 [employer], [address] [place] [address], [place] [address]. All rights reserved. This information is not intended as a  substitute for professional medical care. Always follow your healthcare professional's instructions.

## 2025-04-23 ENCOUNTER — TELEPHONE (OUTPATIENT)
Dept: RHEUMATOLOGY | Facility: CLINIC | Age: 75
End: 2025-04-23
Payer: OTHER GOVERNMENT

## 2025-04-23 ENCOUNTER — INFUSION (OUTPATIENT)
Dept: INFUSION THERAPY | Facility: HOSPITAL | Age: 75
End: 2025-04-23
Attending: PHYSICIAN ASSISTANT
Payer: OTHER GOVERNMENT

## 2025-04-23 ENCOUNTER — LAB VISIT (OUTPATIENT)
Dept: LAB | Facility: HOSPITAL | Age: 75
End: 2025-04-23
Attending: INTERNAL MEDICINE
Payer: OTHER GOVERNMENT

## 2025-04-23 VITALS
TEMPERATURE: 97 F | OXYGEN SATURATION: 98 % | DIASTOLIC BLOOD PRESSURE: 73 MMHG | RESPIRATION RATE: 18 BRPM | SYSTOLIC BLOOD PRESSURE: 145 MMHG | HEART RATE: 71 BPM

## 2025-04-23 DIAGNOSIS — M32.19 OTHER SYSTEMIC LUPUS ERYTHEMATOSUS WITH OTHER ORGAN INVOLVEMENT: Primary | ICD-10-CM

## 2025-04-23 DIAGNOSIS — E55.9 VITAMIN D DEFICIENCY: ICD-10-CM

## 2025-04-23 DIAGNOSIS — Z79.899 HIGH RISK MEDICATION USE: Primary | ICD-10-CM

## 2025-04-23 DIAGNOSIS — M32.19 OTHER SYSTEMIC LUPUS ERYTHEMATOSUS WITH OTHER ORGAN INVOLVEMENT: ICD-10-CM

## 2025-04-23 DIAGNOSIS — Z79.899 HIGH RISK MEDICATION USE: ICD-10-CM

## 2025-04-23 DIAGNOSIS — M85.80 OSTEOPENIA WITH HIGH RISK OF FRACTURE: Primary | ICD-10-CM

## 2025-04-23 LAB
ABSOLUTE EOSINOPHIL (OHS): 0.12 K/UL
ABSOLUTE MONOCYTE (OHS): 0.66 K/UL (ref 0.3–1)
ABSOLUTE NEUTROPHIL COUNT (OHS): 3.81 K/UL (ref 1.8–7.7)
ALBUMIN SERPL BCP-MCNC: 3.8 G/DL (ref 3.5–5.2)
ALBUMIN SERPL BCP-MCNC: 4 G/DL (ref 3.5–5.2)
ALP SERPL-CCNC: 58 UNIT/L (ref 40–150)
ALP SERPL-CCNC: 60 UNIT/L (ref 40–150)
ALT SERPL W/O P-5'-P-CCNC: 12 UNIT/L (ref 10–44)
ALT SERPL W/O P-5'-P-CCNC: 9 UNIT/L (ref 10–44)
ANION GAP (OHS): 9 MMOL/L (ref 8–16)
ANION GAP (OHS): 9 MMOL/L (ref 8–16)
AST SERPL-CCNC: 19 UNIT/L (ref 11–45)
AST SERPL-CCNC: 20 UNIT/L (ref 11–45)
BASOPHILS # BLD AUTO: 0.04 K/UL
BASOPHILS NFR BLD AUTO: 0.5 %
BILIRUB SERPL-MCNC: 1 MG/DL (ref 0.1–1)
BILIRUB SERPL-MCNC: 1.1 MG/DL (ref 0.1–1)
BILIRUB UR QL STRIP.AUTO: NEGATIVE
BUN SERPL-MCNC: 9 MG/DL (ref 8–23)
BUN SERPL-MCNC: 9 MG/DL (ref 8–23)
CALCIUM SERPL-MCNC: 9.5 MG/DL (ref 8.7–10.5)
CALCIUM SERPL-MCNC: 9.6 MG/DL (ref 8.7–10.5)
CHLORIDE SERPL-SCNC: 107 MMOL/L (ref 95–110)
CHLORIDE SERPL-SCNC: 108 MMOL/L (ref 95–110)
CLARITY UR: CLEAR
CO2 SERPL-SCNC: 25 MMOL/L (ref 23–29)
CO2 SERPL-SCNC: 26 MMOL/L (ref 23–29)
COLOR UR AUTO: ABNORMAL
CREAT SERPL-MCNC: 0.9 MG/DL (ref 0.5–1.4)
CREAT SERPL-MCNC: 0.9 MG/DL (ref 0.5–1.4)
CREAT UR-MCNC: 107 MG/DL (ref 15–325)
CRP SERPL-MCNC: 2.8 MG/L
ERYTHROCYTE [DISTWIDTH] IN BLOOD BY AUTOMATED COUNT: 12.4 % (ref 11.5–14.5)
ERYTHROCYTE [SEDIMENTATION RATE] IN BLOOD BY PHOTOMETRIC METHOD: 23 MM/HR
GFR SERPLBLD CREATININE-BSD FMLA CKD-EPI: >60 ML/MIN/1.73/M2
GFR SERPLBLD CREATININE-BSD FMLA CKD-EPI: >60 ML/MIN/1.73/M2
GLUCOSE SERPL-MCNC: 117 MG/DL (ref 70–110)
GLUCOSE SERPL-MCNC: 121 MG/DL (ref 70–110)
GLUCOSE UR QL STRIP: NEGATIVE
HCT VFR BLD AUTO: 47.4 % (ref 37–48.5)
HGB BLD-MCNC: 14.9 GM/DL (ref 12–16)
HGB UR QL STRIP: ABNORMAL
IMM GRANULOCYTES # BLD AUTO: 0.02 K/UL (ref 0–0.04)
IMM GRANULOCYTES NFR BLD AUTO: 0.3 % (ref 0–0.5)
KETONES UR QL STRIP: NEGATIVE
LEUKOCYTE ESTERASE UR QL STRIP: NEGATIVE
LYMPHOCYTES # BLD AUTO: 2.83 K/UL (ref 1–4.8)
MCH RBC QN AUTO: 30.9 PG (ref 27–31)
MCHC RBC AUTO-ENTMCNC: 31.4 G/DL (ref 32–36)
MCV RBC AUTO: 98 FL (ref 82–98)
NITRITE UR QL STRIP: NEGATIVE
NUCLEATED RBC (/100WBC) (OHS): 0 /100 WBC
PH UR STRIP: 8 [PH]
PLATELET # BLD AUTO: 277 K/UL (ref 150–450)
PMV BLD AUTO: 9.5 FL (ref 9.2–12.9)
POTASSIUM SERPL-SCNC: 4.7 MMOL/L (ref 3.5–5.1)
POTASSIUM SERPL-SCNC: 4.7 MMOL/L (ref 3.5–5.1)
PROT SERPL-MCNC: 8.8 GM/DL (ref 6–8.4)
PROT SERPL-MCNC: 9.1 GM/DL (ref 6–8.4)
PROT UR QL STRIP: NEGATIVE
PROT UR-MCNC: <7 MG/DL
PROT/CREAT UR: NORMAL MG/G{CREAT}
RBC # BLD AUTO: 4.82 M/UL (ref 4–5.4)
RELATIVE EOSINOPHIL (OHS): 1.6 %
RELATIVE LYMPHOCYTE (OHS): 37.8 % (ref 18–48)
RELATIVE MONOCYTE (OHS): 8.8 % (ref 4–15)
RELATIVE NEUTROPHIL (OHS): 51 % (ref 38–73)
SODIUM SERPL-SCNC: 142 MMOL/L (ref 136–145)
SODIUM SERPL-SCNC: 142 MMOL/L (ref 136–145)
SP GR UR STRIP: 1.01
WBC # BLD AUTO: 7.48 K/UL (ref 3.9–12.7)

## 2025-04-23 PROCEDURE — 86160 COMPLEMENT ANTIGEN: CPT

## 2025-04-23 PROCEDURE — 85025 COMPLETE CBC W/AUTO DIFF WBC: CPT

## 2025-04-23 PROCEDURE — 63600175 PHARM REV CODE 636 W HCPCS: Mod: JZ,TB | Performed by: STUDENT IN AN ORGANIZED HEALTH CARE EDUCATION/TRAINING PROGRAM

## 2025-04-23 PROCEDURE — 86225 DNA ANTIBODY NATIVE: CPT

## 2025-04-23 PROCEDURE — 82570 ASSAY OF URINE CREATININE: CPT

## 2025-04-23 PROCEDURE — 86160 COMPLEMENT ANTIGEN: CPT | Mod: 59

## 2025-04-23 PROCEDURE — 81003 URINALYSIS AUTO W/O SCOPE: CPT

## 2025-04-23 PROCEDURE — 85652 RBC SED RATE AUTOMATED: CPT

## 2025-04-23 PROCEDURE — 96372 THER/PROPH/DIAG INJ SC/IM: CPT

## 2025-04-23 PROCEDURE — 86140 C-REACTIVE PROTEIN: CPT

## 2025-04-23 PROCEDURE — 84450 TRANSFERASE (AST) (SGOT): CPT

## 2025-04-23 PROCEDURE — 36415 COLL VENOUS BLD VENIPUNCTURE: CPT

## 2025-04-23 PROCEDURE — 82306 VITAMIN D 25 HYDROXY: CPT

## 2025-04-23 RX ADMIN — DENOSUMAB 60 MG: 60 INJECTION SUBCUTANEOUS at 03:04

## 2025-04-23 NOTE — TELEPHONE ENCOUNTER
Spoke to . He said she is on the way to the clinic. I told her what I was trying to get a hold of her, but it went straight to voice mail.

## 2025-04-23 NOTE — TELEPHONE ENCOUNTER
----- Message from Dawna sent at 4/23/2025 12:59 PM CDT -----  Contact: pt                                                                    URGENTType:  Patient Returning CallWho Called: ptWho Left Message for Patient: nurseDoes the patient know what this is regarding?: appt to be reschWould the patient rather a call back or a response via [facility]? phoneBest Call Back Number: Additional Information:  pt states her phone is not working, please call back at [phone]

## 2025-04-23 NOTE — TELEPHONE ENCOUNTER
Pt is aware that Dr. Forrest had to leave early on today and that it is ok  for her to get her labs and prolia done on today .

## 2025-04-24 LAB
25(OH)D3+25(OH)D2 SERPL-MCNC: 28 NG/ML (ref 30–96)
C3 SERPL-MCNC: 145 MG/DL (ref 50–180)
C4 COMPLEMENT (OHS): 35 MG/DL (ref 11–44)
DSDNA ANTIBODY (OHS): NORMAL
DSDNA ANTIBODY TITER (OHS): NORMAL

## 2025-05-05 ENCOUNTER — TELEPHONE (OUTPATIENT)
Dept: RHEUMATOLOGY | Facility: CLINIC | Age: 75
End: 2025-05-05
Payer: OTHER GOVERNMENT

## 2025-05-05 ENCOUNTER — RESULTS FOLLOW-UP (OUTPATIENT)
Dept: RHEUMATOLOGY | Facility: CLINIC | Age: 75
End: 2025-05-05

## 2025-05-05 NOTE — TELEPHONE ENCOUNTER
Spoke to pt she verbally understood to get established with an rheumatologist and to get otc vitamin d 1000 units.

## 2025-05-06 ENCOUNTER — OFFICE VISIT (OUTPATIENT)
Dept: FAMILY MEDICINE | Facility: CLINIC | Age: 75
End: 2025-05-06
Payer: OTHER GOVERNMENT

## 2025-05-06 VITALS
HEART RATE: 77 BPM | HEIGHT: 64 IN | OXYGEN SATURATION: 99 % | DIASTOLIC BLOOD PRESSURE: 74 MMHG | SYSTOLIC BLOOD PRESSURE: 133 MMHG | BODY MASS INDEX: 23.92 KG/M2 | TEMPERATURE: 97 F | WEIGHT: 140.13 LBS

## 2025-05-06 DIAGNOSIS — M87.019 AVASCULAR NECROSIS OF BONE OF SHOULDER: ICD-10-CM

## 2025-05-06 DIAGNOSIS — M32.9 SYSTEMIC LUPUS ERYTHEMATOSUS, UNSPECIFIED SLE TYPE, UNSPECIFIED ORGAN INVOLVEMENT STATUS: ICD-10-CM

## 2025-05-06 DIAGNOSIS — M85.80 OSTEOPENIA WITH HIGH RISK OF FRACTURE: ICD-10-CM

## 2025-05-06 DIAGNOSIS — Z86.0101 HISTORY OF ADENOMATOUS POLYP OF COLON: ICD-10-CM

## 2025-05-06 DIAGNOSIS — Z12.31 ENCOUNTER FOR SCREENING MAMMOGRAM FOR MALIGNANT NEOPLASM OF BREAST: ICD-10-CM

## 2025-05-06 DIAGNOSIS — J12.3 HUMAN METAPNEUMOVIRUS (HMPV) PNEUMONIA: Primary | ICD-10-CM

## 2025-05-06 DIAGNOSIS — Z12.11 SCREENING FOR COLON CANCER: ICD-10-CM

## 2025-05-06 PROCEDURE — 99214 OFFICE O/P EST MOD 30 MIN: CPT | Mod: S$PBB,,, | Performed by: FAMILY MEDICINE

## 2025-05-06 PROCEDURE — 99215 OFFICE O/P EST HI 40 MIN: CPT | Mod: PBBFAC,PO | Performed by: FAMILY MEDICINE

## 2025-05-06 PROCEDURE — G2211 COMPLEX E/M VISIT ADD ON: HCPCS | Mod: S$PBB,,, | Performed by: FAMILY MEDICINE

## 2025-05-06 PROCEDURE — 99999 PR PBB SHADOW E&M-EST. PATIENT-LVL V: CPT | Mod: PBBFAC,,, | Performed by: FAMILY MEDICINE

## 2025-05-06 RX ORDER — ALBUTEROL SULFATE 90 UG/1
2 INHALANT RESPIRATORY (INHALATION) EVERY 6 HOURS PRN
COMMUNITY
Start: 2025-05-04 | End: 2025-05-06 | Stop reason: SDUPTHER

## 2025-05-06 RX ORDER — BENZONATATE 100 MG/1
100 CAPSULE ORAL
COMMUNITY
Start: 2025-05-04 | End: 2025-05-06

## 2025-05-06 RX ORDER — CHOLECALCIFEROL (VITAMIN D3) 25 MCG
1000 TABLET ORAL DAILY
COMMUNITY

## 2025-05-06 RX ORDER — ALBUTEROL SULFATE 90 UG/1
2 INHALANT RESPIRATORY (INHALATION) EVERY 6 HOURS PRN
Qty: 18 G | Refills: 0 | Status: SHIPPED | OUTPATIENT
Start: 2025-05-06

## 2025-05-06 RX ORDER — AZITHROMYCIN 250 MG/1
TABLET, FILM COATED ORAL
COMMUNITY
Start: 2025-05-04 | End: 2025-05-06

## 2025-05-06 NOTE — PROGRESS NOTES
History of Present Illness    Ms. Loza was evaluated in the emergency room on May 4th with complaints of cough. She tested positive for human metapneumovirus, and a portable chest XR showed possible viral pneumonia. She was negative for COVID and flu. In the ER, she was prescribed treatment including azithromycin, doxycycline, albuterol, Tessalon, and Mucinex.  She never picked up the antibiotics.  She reports significant improvement today, although coughing resumed last night with some wheezing. She has not had a fever and has not yet filled the prescriptions given at the hospital. By Saturday night, she was coughing throughout the night, but it did not disrupt her sleep.    She has lupus and her rheumatologist is moving and needs another referral.  Previous avascular necrosis in the shoulder.  They are also treating her with Prolia for osteopenia with increased fracture risk.    In addition she is due for colonoscopy    ROS:  General: -fever  Respiratory: +cough, +wheezing           Alice was seen today for cough.    Diagnoses and all orders for this visit:    Human metapneumovirus (hMPV) pneumonia  -     albuterol (PROVENTIL/VENTOLIN HFA) 90 mcg/actuation inhaler; Inhale 2 puffs into the lungs every 6 (six) hours as needed for Shortness of Breath or Wheezing.    Avascular necrosis of bone of shoulder  -     Ambulatory referral/consult to Rheumatology; Future    Systemic lupus erythematosus, unspecified SLE type, unspecified organ involvement status  -     Ambulatory referral/consult to Rheumatology; Future    Osteopenia with high risk of fracture  -     Ambulatory referral/consult to Rheumatology; Future    Screening for colon cancer  -     Ambulatory referral/consult to Endo Procedure ; Future    History of adenomatous polyp of colon  -     Ambulatory referral/consult to Endo Procedure ; Future    Encounter for screening mammogram for malignant neoplasm of breast  -     [facility]  "Screening Bilat w/ Sebastián (XPD); Future    Patient improving.  She can hold off on the antibiotics due to viral disease.  We did send a prescription for the albuterol.  Referrals as above.  Follow-up if symptoms worsen or not improving.        This note was generated with the assistance of ambient listening technology. Verbal consent was obtained by the patient and accompanying visitor(s) for the recording of patient appointment to facilitate this note. I attest to having reviewed and edited the generated note for accuracy, though some syntax or spelling errors may persist. Please contact the author of this note for any clarification.        Past Medical History:  Past Medical History:   Diagnosis Date    Avascular necrosis of hip     Left    Avascular necrosis of humeral head     Right    Cataract     Human metapneumovirus (hMPV) pneumonia 05/06/2025    Lumbar disc disease     Lupus     Osteoarthritis     Osteopenia     Osteoporosis     Raynaud phenomenon     Systemic lupus erythematosus     Tubular adenoma of colon 09/20/2016    Unspecified vitamin D deficiency      Past Surgical History:   Procedure Laterality Date    CHOLECYSTECTOMY      COLONOSCOPY N/A 9/20/2016    Procedure: COLONOSCOPY;  Surgeon: Aroldo Raphael MD;  Location: [facility];  Service: Endoscopy;  Laterality: N/A;    TOTAL HIP ARTHROPLASTY      Left     Review of patient's allergies indicates:   Allergen Reactions    No known drug allergies      Medications Ordered Prior to Encounter[1]  Social History[2]  Family History   Problem Relation Name Age of Onset    Cervical cancer Mother      Heart attack Father  62    Breast cancer Sister      Diabetes Brother      Cataracts Maternal Aunt             Vitals:    05/06/25 1317   BP: 133/74   Pulse: 77   Temp: 97.3 °F (36.3 °C)   TempSrc: Temporal   SpO2: 99%   Weight: 63.5 kg (140 lb 1.6 oz)   Height: 5' 4" (1.626 m)       Wt Readings from Last 3 Encounters:   05/06/25 63.5 kg (140 lb 1.6 oz)   04/22/24 " 63.8 kg (140 lb 10.5 oz)   04/22/24 63.8 kg (140 lb 10.5 oz)       APPEARANCE: Well nourished, well developed, in no acute distress.    HEAD: Normocephalic.  Atraumatic.  Sinuses nontender  Ears:  Tympanic membranes intact.  No effusion or erythema.  Nose clear  Throat no erythema or exudate  EYES:   Right eye: Pupil reactive.  Conjunctiva clear.    Left eye: Pupil reactive.  Conjunctiva clear.    NECK: Supple. No bruits.  No JVD.  No cervical lymphadenopathy.  No thyromegaly.    CHEST: Breath sounds clear bilaterally.  Normal respiratory effort  CARDIOVASCULAR: Normal rate.  Regular rhythm.  No murmurs.  No rub.  No gallops.   No edema.  MENTAL STATUS: Alert.  Oriented x 3.       [1]   Current Outpatient Medications on File Prior to Visit   Medication Sig Dispense Refill    cyclobenzaprine (FLEXERIL) 10 MG tablet Take 1 tablet (10 mg total) by mouth every evening. 90 tablet 3    hydroxychloroquine (PLAQUENIL) 200 mg tablet Take one tablet by  Mouth three times a week 36 tablet 3    LIDOcaine (LIDODERM) 5 % Place 1 patch onto the skin.      meclizine (ANTIVERT) 12.5 mg tablet Take 1 tablet (12.5 mg total) by mouth 2 (two) times daily as needed for Dizziness. 14 tablet 0    meloxicam (MOBIC) 15 MG tablet Take 1 tablet (15 mg total) by mouth once daily. As needed for pain 90 tablet 2    nystatin (MYCOSTATIN) powder Apply topically Twice daily. 60 g 3    vitamin D (VITAMIN D3) 1000 units Tab Take 1,000 Units by mouth once daily.      vitamin E 400 UNIT capsule Take 1 capsule (400 Units total) by mouth once daily.      [DISCONTINUED] albuterol (PROVENTIL/VENTOLIN HFA) 90 mcg/actuation inhaler 2 puffs every 6 (six) hours as needed.      [DISCONTINUED] ergocalciferol (ERGOCALCIFEROL) 50,000 unit Cap Take 1 capsule (50,000 Units total) by mouth every 7 days. 12 capsule 3    desonide (DESOWEN) 0.05 % cream Apply topically 2 (two) times daily. for 10 days 60 g 2    pravastatin (PRAVACHOL) 20 MG tablet Take 1 tablet (20 mg  total) by mouth once daily. 90 tablet 3    triamcinolone acetonide 0.1% (KENALOG) 0.1 % cream Apply topically 2 (two) times daily as needed. Thin film to area on face 45 g 1    [DISCONTINUED] azithromycin (Z-LUCINDA) 250 MG tablet Take two 250mg tablets PO on day 1, then one 250mg tablet PO daily for 4 days. (Patient not taking: Reported on 5/6/2025)      [DISCONTINUED] benzonatate (TESSALON) 100 MG capsule Take 100 mg by mouth. (Patient not taking: Reported on 5/6/2025)       No current facility-administered medications on file prior to visit.   [2]   Social History  Socioeconomic History    Marital status:    Tobacco Use    Smoking status: Never    Smokeless tobacco: Never   Substance and Sexual Activity    Alcohol use: No    Drug use: No    Sexual activity: Not Currently

## 2025-05-06 NOTE — PATIENT INSTRUCTIONS
Endoscopy scheduling will contact you to schedule colonoscopy, you can reach them at [phone] for any questions or concerns    Call [phone] to schedule appt with new rheumatologist

## 2025-05-12 ENCOUNTER — HOSPITAL ENCOUNTER (OUTPATIENT)
Dept: PREADMISSION TESTING | Facility: HOSPITAL | Age: 75
Discharge: HOME | End: 2025-05-12
Attending: FAMILY MEDICINE
Payer: OTHER GOVERNMENT

## 2025-05-12 DIAGNOSIS — Z12.11 SCREENING FOR COLON CANCER: Primary | ICD-10-CM

## 2025-05-12 DIAGNOSIS — Z86.0101 HISTORY OF ADENOMATOUS POLYP OF COLON: ICD-10-CM

## 2025-05-12 RX ORDER — POLYETHYLENE GLYCOL 3350, SODIUM SULFATE ANHYDROUS, SODIUM BICARBONATE, SODIUM CHLORIDE, POTASSIUM CHLORIDE 236; 22.74; 6.74; 5.86; 2.97 G/4L; G/4L; G/4L; G/4L; G/4L
4 POWDER, FOR SOLUTION ORAL ONCE
Qty: 4000 ML | Refills: 0 | Status: SHIPPED | OUTPATIENT
Start: 2025-05-12 | End: 2025-05-12

## 2025-05-26 DIAGNOSIS — Z00.00 ENCOUNTER FOR MEDICARE ANNUAL WELLNESS EXAM: ICD-10-CM

## 2025-05-27 ENCOUNTER — HOSPITAL ENCOUNTER (OUTPATIENT)
Dept: RADIOLOGY | Facility: HOSPITAL | Age: 75
Discharge: HOME | End: 2025-05-27
Attending: FAMILY MEDICINE
Payer: OTHER GOVERNMENT

## 2025-05-27 DIAGNOSIS — Z12.31 ENCOUNTER FOR SCREENING MAMMOGRAM FOR MALIGNANT NEOPLASM OF BREAST: ICD-10-CM

## 2025-05-27 PROCEDURE — 77063 BREAST TOMOSYNTHESIS BI: CPT | Mod: TC,PO

## 2025-05-27 PROCEDURE — 77063 BREAST TOMOSYNTHESIS BI: CPT | Mod: 26,,, | Performed by: RADIOLOGY

## 2025-05-27 PROCEDURE — 77067 SCR MAMMO BI INCL CAD: CPT | Mod: 26,,, | Performed by: RADIOLOGY

## 2025-06-24 ENCOUNTER — RESULTS FOLLOW-UP (OUTPATIENT)
Dept: FAMILY MEDICINE | Facility: CLINIC | Age: 75
End: 2025-06-24

## 2025-06-24 ENCOUNTER — HOSPITAL ENCOUNTER (OUTPATIENT)
Dept: RADIOLOGY | Facility: HOSPITAL | Age: 75
Discharge: HOME | End: 2025-06-24
Attending: FAMILY MEDICINE
Payer: OTHER GOVERNMENT

## 2025-06-24 ENCOUNTER — OFFICE VISIT (OUTPATIENT)
Dept: FAMILY MEDICINE | Facility: CLINIC | Age: 75
End: 2025-06-24
Payer: OTHER GOVERNMENT

## 2025-06-24 VITALS
BODY MASS INDEX: 23.42 KG/M2 | HEIGHT: 64 IN | HEART RATE: 76 BPM | TEMPERATURE: 98 F | SYSTOLIC BLOOD PRESSURE: 152 MMHG | WEIGHT: 137.19 LBS | OXYGEN SATURATION: 99 % | DIASTOLIC BLOOD PRESSURE: 74 MMHG

## 2025-06-24 DIAGNOSIS — Z78.0 ASYMPTOMATIC MENOPAUSE: ICD-10-CM

## 2025-06-24 DIAGNOSIS — I10 PRIMARY HYPERTENSION: ICD-10-CM

## 2025-06-24 DIAGNOSIS — Z86.0101 HISTORY OF ADENOMATOUS POLYP OF COLON: ICD-10-CM

## 2025-06-24 DIAGNOSIS — M32.8 OTHER FORMS OF SYSTEMIC LUPUS ERYTHEMATOSUS, UNSPECIFIED ORGAN INVOLVEMENT STATUS: ICD-10-CM

## 2025-06-24 DIAGNOSIS — M85.80 OSTEOPENIA, UNSPECIFIED LOCATION: ICD-10-CM

## 2025-06-24 DIAGNOSIS — J12.3 HUMAN METAPNEUMOVIRUS (HMPV) PNEUMONIA: ICD-10-CM

## 2025-06-24 DIAGNOSIS — Z87.01 HISTORY OF RECENT PNEUMONIA: ICD-10-CM

## 2025-06-24 DIAGNOSIS — E78.5 HYPERLIPIDEMIA, UNSPECIFIED HYPERLIPIDEMIA TYPE: ICD-10-CM

## 2025-06-24 DIAGNOSIS — Z87.01 HISTORY OF RECENT PNEUMONIA: Primary | ICD-10-CM

## 2025-06-24 LAB
BACTERIA #/AREA URNS HPF: ABNORMAL /HPF
BILIRUB UR QL STRIP.AUTO: NEGATIVE
CLARITY UR: CLEAR
COLOR UR AUTO: YELLOW
GLUCOSE UR QL STRIP: NEGATIVE
HGB UR QL STRIP: ABNORMAL
HOLD SPECIMEN: NORMAL
KETONES UR QL STRIP: NEGATIVE
LEUKOCYTE ESTERASE UR QL STRIP: NEGATIVE
MICROSCOPIC COMMENT: ABNORMAL
NITRITE UR QL STRIP: NEGATIVE
OHS QRS DURATION: 86 MS
OHS QTC CALCULATION: 445 MS
PH UR STRIP: 6 [PH]
PROT UR QL STRIP: NEGATIVE
RBC #/AREA URNS HPF: 5 /HPF (ref 0–4)
SP GR UR STRIP: 1.02
SQUAMOUS #/AREA URNS HPF: 3 /HPF
WBC #/AREA URNS HPF: <1 /HPF (ref 0–5)

## 2025-06-24 PROCEDURE — G2211 COMPLEX E/M VISIT ADD ON: HCPCS | Mod: ,,, | Performed by: FAMILY MEDICINE

## 2025-06-24 PROCEDURE — 71046 X-RAY EXAM CHEST 2 VIEWS: CPT | Mod: TC,PO

## 2025-06-24 PROCEDURE — 77080 DXA BONE DENSITY AXIAL: CPT | Mod: 26,,, | Performed by: RADIOLOGY

## 2025-06-24 PROCEDURE — 77080 DXA BONE DENSITY AXIAL: CPT | Mod: TC,PO

## 2025-06-24 PROCEDURE — 71046 X-RAY EXAM CHEST 2 VIEWS: CPT | Mod: 26,,, | Performed by: RADIOLOGY

## 2025-06-24 PROCEDURE — 99215 OFFICE O/P EST HI 40 MIN: CPT | Mod: PBBFAC,25,PO | Performed by: FAMILY MEDICINE

## 2025-06-24 PROCEDURE — 93010 ELECTROCARDIOGRAM REPORT: CPT | Mod: S$PBB,,, | Performed by: INTERNAL MEDICINE

## 2025-06-24 PROCEDURE — 93005 ELECTROCARDIOGRAM TRACING: CPT | Mod: PBBFAC,PO | Performed by: INTERNAL MEDICINE

## 2025-06-24 PROCEDURE — 99214 OFFICE O/P EST MOD 30 MIN: CPT | Mod: S$PBB,,, | Performed by: FAMILY MEDICINE

## 2025-06-24 PROCEDURE — 81000 URINALYSIS NONAUTO W/SCOPE: CPT | Mod: PO | Performed by: FAMILY MEDICINE

## 2025-06-24 PROCEDURE — 99999 PR PBB SHADOW E&M-EST. PATIENT-LVL V: CPT | Mod: PBBFAC,,, | Performed by: FAMILY MEDICINE

## 2025-06-24 RX ORDER — VALSARTAN 80 MG/1
80 TABLET ORAL DAILY
Qty: 90 TABLET | Refills: 0 | Status: SHIPPED | OUTPATIENT
Start: 2025-06-24 | End: 2026-06-24

## 2025-06-24 NOTE — PROGRESS NOTES
Patient presents for follow-up after she had human metapneumovirus pneumonia proximally 6 weeks ago.  Feels much better.  Lipidemia due for laboratory.  Lupus pending follow-up with Rheumatology.  Osteopenia due for DEXA scan.  Hypertension noted again today.  She is not treatment for hypertension.  She has had several blood pressure readings which were elevated.    Alice was seen today for follow-up.    Diagnoses and all orders for this visit:    History of recent pneumonia  -     X-Ray Chest PA And Lateral; Future    Human metapneumovirus (hMPV) pneumonia  -     X-Ray Chest PA And Lateral; Future    Hyperlipidemia, unspecified hyperlipidemia type  -     Lipid Panel; Future    Other forms of systemic lupus erythematosus, unspecified organ involvement status    History of adenomatous polyp of colon    Osteopenia, unspecified location  -     DXA Bone Density Axial Skeleton 1 or more sites; Future    Asymptomatic menopause  -     DXA Bone Density Axial Skeleton 1 or more sites; Future    Primary hypertension  -     TSH; Future  -     Urinalysis, Reflex to Urine Culture Urine, Clean Catch; Future  -     EKG 12-lead  -     Urinalysis, Reflex to Urine Culture Urine, Clean Catch  -     GREY TOP URINE HOLD  -     Urinalysis Microscopic    Other orders  -     valsartan (DIOVAN) 80 MG tablet; Take 1 tablet (80 mg total) by mouth once daily.    Start valsartan.  Follow-up in a month.  Keep follow-up scheduled Rheumatology.        This note was generated with the assistance of ambient listening technology. Verbal consent was obtained by the patient and accompanying visitor(s) for the recording of patient appointment to facilitate this note. I attest to having reviewed and edited the generated note for accuracy, though some syntax or spelling errors may persist. Please contact the author of this note for any clarification.        Past Medical History:  Past Medical History:   Diagnosis Date    Avascular necrosis of hip   "   Left    Avascular necrosis of humeral head     Right    Cataract     Human metapneumovirus (hMPV) pneumonia 05/06/2025    Lumbar disc disease     Lupus     Osteoarthritis     Osteopenia     Osteoporosis     Raynaud phenomenon     Systemic lupus erythematosus     Tubular adenoma of colon 09/20/2016    Unspecified vitamin D deficiency      Past Surgical History:   Procedure Laterality Date    CHOLECYSTECTOMY      COLONOSCOPY N/A 9/20/2016    Procedure: COLONOSCOPY;  Surgeon: Aroldo Raphael MD;  Location: [facility];  Service: Endoscopy;  Laterality: N/A;    TOTAL HIP ARTHROPLASTY      Left     Review of patient's allergies indicates:   Allergen Reactions    No known drug allergies      Medications Ordered Prior to Encounter[1]  Social History[2]  Family History   Problem Relation Name Age of Onset    Cervical cancer Mother      Heart attack Father  62    Breast cancer Sister      Diabetes Brother      Cataracts Maternal Aunt             Vitals:    06/24/25 0907 06/24/25 0937   BP: (!) 150/82 (!) 152/74   Pulse: 76    Temp: 97.5 °F (36.4 °C)    TempSrc: Temporal    SpO2: 99%    Weight: 62.2 kg (137 lb 3.2 oz)    Height: 5' 4" (1.626 m)      Wt Readings from Last 3 Encounters:   06/24/25 62.2 kg (137 lb 3.2 oz)   05/06/25 63.5 kg (140 lb 1.6 oz)   04/22/24 63.8 kg (140 lb 10.5 oz)       OBJECTIVE:   APPEARANCE: Well nourished, well developed, in no acute distress.    HEAD: Normocephalic.  Atraumatic.  No sinus tenderness.  EYES:   Right eye: Pupil reactive.  Conjunctiva clear.    Left eye: Pupil reactive.  Conjunctiva clear.  EOMI.    EARS: TM's intact. Light reflex normal. No retraction or perforation.    NOSE:  clear.  MOUTH & THROAT:  No pharyngeal erythema or exudate. No lesions.  NECK: Supple. No bruits.  No JVD.  No cervical lymphadenopathy.  No thyromegaly.    CHEST: Breath sounds clear bilaterally.  Normal respiratory effort  CARDIOVASCULAR: Normal rate.  Regular rhythm.  No murmurs.  No rub.  No " gallops.  ABDOMEN: Bowel sounds normal.  Soft.  No tenderness.  No organomegaly.  PERIPHERAL VASCULAR: No cyanosis.  No clubbing.  No edema.  NEUROLOGIC: No focal findings.  MENTAL STATUS: Alert.  Oriented x 3.             [1]   Current Outpatient Medications on File Prior to Visit   Medication Sig Dispense Refill    albuterol (PROVENTIL/VENTOLIN HFA) 90 mcg/actuation inhaler Inhale 2 puffs into the lungs every 6 (six) hours as needed for Shortness of Breath or Wheezing. 18 g 0    cyclobenzaprine (FLEXERIL) 10 MG tablet Take 1 tablet (10 mg total) by mouth every evening. 90 tablet 3    hydroxychloroquine (PLAQUENIL) 200 mg tablet Take one tablet by  Mouth three times a week 36 tablet 3    LIDOcaine (LIDODERM) 5 % Place 1 patch onto the skin.      meclizine (ANTIVERT) 12.5 mg tablet Take 1 tablet (12.5 mg total) by mouth 2 (two) times daily as needed for Dizziness. 14 tablet 0    meloxicam (MOBIC) 15 MG tablet Take 1 tablet (15 mg total) by mouth once daily. As needed for pain 90 tablet 2    nystatin (MYCOSTATIN) powder Apply topically Twice daily. 60 g 3    vitamin D (VITAMIN D3) 1000 units Tab Take 1,000 Units by mouth once daily.      vitamin E 400 UNIT capsule Take 1 capsule (400 Units total) by mouth once daily.      desonide (DESOWEN) 0.05 % cream Apply topically 2 (two) times daily. for 10 days 60 g 2    pravastatin (PRAVACHOL) 20 MG tablet Take 1 tablet (20 mg total) by mouth once daily. 90 tablet 3    triamcinolone acetonide 0.1% (KENALOG) 0.1 % cream Apply topically 2 (two) times daily as needed. Thin film to area on face 45 g 1     No current facility-administered medications on file prior to visit.   [2]   Social History  Socioeconomic History    Marital status:    Tobacco Use    Smoking status: Never    Smokeless tobacco: Never   Substance and Sexual Activity    Alcohol use: No    Drug use: No    Sexual activity: Not Currently

## 2025-07-03 ENCOUNTER — HOSPITAL ENCOUNTER (OUTPATIENT)
Dept: RADIOLOGY | Facility: HOSPITAL | Age: 75
Discharge: HOME | End: 2025-07-03
Attending: INTERNAL MEDICINE
Payer: OTHER GOVERNMENT

## 2025-07-03 ENCOUNTER — OFFICE VISIT (OUTPATIENT)
Dept: RHEUMATOLOGY | Facility: CLINIC | Age: 75
End: 2025-07-03
Payer: MEDICARE

## 2025-07-03 VITALS
DIASTOLIC BLOOD PRESSURE: 69 MMHG | BODY MASS INDEX: 23.46 KG/M2 | WEIGHT: 136.69 LBS | SYSTOLIC BLOOD PRESSURE: 136 MMHG | HEART RATE: 72 BPM

## 2025-07-03 DIAGNOSIS — M75.52 CHRONIC BURSITIS OF LEFT SHOULDER: ICD-10-CM

## 2025-07-03 DIAGNOSIS — M19.211 OTHER SECONDARY OSTEOARTHRITIS OF BOTH SHOULDERS: ICD-10-CM

## 2025-07-03 DIAGNOSIS — I73.00 RAYNAUD'S PHENOMENON WITHOUT GANGRENE: ICD-10-CM

## 2025-07-03 DIAGNOSIS — M85.80 OSTEOPENIA WITH HIGH RISK OF FRACTURE: ICD-10-CM

## 2025-07-03 DIAGNOSIS — E78.5 HYPERLIPIDEMIA, UNSPECIFIED HYPERLIPIDEMIA TYPE: ICD-10-CM

## 2025-07-03 DIAGNOSIS — M19.212 OTHER SECONDARY OSTEOARTHRITIS OF BOTH SHOULDERS: ICD-10-CM

## 2025-07-03 DIAGNOSIS — M32.9 SYSTEMIC LUPUS ERYTHEMATOSUS, UNSPECIFIED SLE TYPE, UNSPECIFIED ORGAN INVOLVEMENT STATUS: ICD-10-CM

## 2025-07-03 DIAGNOSIS — M32.9 SYSTEMIC LUPUS ERYTHEMATOSUS, UNSPECIFIED SLE TYPE, UNSPECIFIED ORGAN INVOLVEMENT STATUS: Primary | ICD-10-CM

## 2025-07-03 PROCEDURE — 73030 X-RAY EXAM OF SHOULDER: CPT | Mod: TC,50,FY,PO

## 2025-07-03 PROCEDURE — 99214 OFFICE O/P EST MOD 30 MIN: CPT | Mod: PBBFAC,25,PO | Performed by: INTERNAL MEDICINE

## 2025-07-03 PROCEDURE — 99999 PR PBB SHADOW E&M-EST. PATIENT-LVL IV: CPT | Mod: PBBFAC,,, | Performed by: INTERNAL MEDICINE

## 2025-07-03 PROCEDURE — 20610 DRAIN/INJ JOINT/BURSA W/O US: CPT | Mod: LT,S$GLB,, | Performed by: INTERNAL MEDICINE

## 2025-07-03 PROCEDURE — 73030 X-RAY EXAM OF SHOULDER: CPT | Mod: 26,50,, | Performed by: RADIOLOGY

## 2025-07-03 PROCEDURE — 99215 OFFICE O/P EST HI 40 MIN: CPT | Mod: 25,S$GLB,, | Performed by: INTERNAL MEDICINE

## 2025-07-03 RX ORDER — PRAVASTATIN SODIUM 20 MG/1
20 TABLET ORAL DAILY
Qty: 90 TABLET | Refills: 0 | Status: SHIPPED | OUTPATIENT
Start: 2025-07-03 | End: 2025-10-01

## 2025-07-03 RX ORDER — ERGOCALCIFEROL 1.25 MG/1
50000 CAPSULE ORAL
Qty: 12 CAPSULE | Refills: 0 | Status: CANCELLED | OUTPATIENT
Start: 2025-07-03 | End: 2025-09-19

## 2025-07-03 RX ORDER — HYDROXYCHLOROQUINE SULFATE 200 MG/1
TABLET, FILM COATED ORAL
Qty: 36 TABLET | Refills: 3 | Status: SHIPPED | OUTPATIENT
Start: 2025-07-03

## 2025-07-03 RX ORDER — CYCLOBENZAPRINE HCL 10 MG
10 TABLET ORAL NIGHTLY
Qty: 90 TABLET | Refills: 3 | Status: SHIPPED | OUTPATIENT
Start: 2025-07-03

## 2025-07-03 RX ADMIN — METHYLPREDNISOLONE ACETATE 40 MG: 40 INJECTION, SUSPENSION INTRA-ARTICULAR; INTRALESIONAL; INTRAMUSCULAR; SOFT TISSUE at 02:07

## 2025-07-03 NOTE — PROGRESS NOTES
Subjective:       Patient ID: Alice Loza is a 74 y.o. female.    Chief Complaint: Disease Management and Consult    Alice Loza  is a 74 y.o. female seen today for follow-up osteoporosis as well as lupus.  She was diagnosed with lupus years ago.  Currently taking hydroxychloroquine 200 mg 3x /wk.  No recent lupus flares.  Denies tender swollen joints.  Also has Raynaud's.  It is stable on aspirin and vitamin E. no significant or prolonged attacks.  Last several ANAs have been negative as has the dsdna, complements have been normal.  She is over due for eye exam by > 1 yr.  Has been on HCQ since 1997 yrs.  T  Patient denies alopecia, unexplained fevers, SOB, chest pain, lad, oral/fingers/nasal ulcerations, rash, sicca symptoms, progressive weakness, dysphagia.  Rheumatologic ROS otherwise negative    Also with history of osteoporosis.  Previously on Fosamax.    She was transitioned to Prolia with DEXA scan showing high FRAX.  She started Prolia October 2022.  Tolerates it well without any problems.  No fractures since last visit.  She has had a fall.  She uese a cane for ambulation.      He is complaint today is right shoulder pain.  She has history of avascular necrosis in both shoulders.  Last imaging no images fro me to review, she will need these updated.     Current Tx:   Prolia - Last: 4/2025, will get her set up with Dr. Barrientos for this.   Ergo 50k IU weekly  Previous Tx:  Fosamax  GERD: no   Gait:  unsteady - uses a cane  Dental:  None recent and none planned in the immediate future though she states she needs to consider having dental work done  History of Fragility fracture: no    Denies  symptoms of Raynaud's, fever, chills , dry mouth, dry eyes, dysphagia, tight  skin, signs of pleurisy , pericarditis, rashes/photosensitivity, oral ulcers,  chest pain, shortness of breath, GI complaints/IBD,  urinary complaints or hx of proteinura/nephritis,  alopecia , fatigue, photosensitivity,  headache, change in vision, face & jaw pain, ,psoriasis, numbness, anxiety, abdomen pain, nausea, vomiting. No hx of DVT, PE, miscarriages where applicable.         Current Outpatient Medications:     albuterol (PROVENTIL/VENTOLIN HFA) 90 mcg/actuation inhaler, Inhale 2 puffs into the lungs every 6 (six) hours as needed for Shortness of Breath or Wheezing., Disp: 18 g, Rfl: 0    cyclobenzaprine (FLEXERIL) 10 MG tablet, Take 1 tablet (10 mg total) by mouth every evening., Disp: 90 tablet, Rfl: 3    desonide (DESOWEN) 0.05 % cream, Apply topically 2 (two) times daily. for 10 days, Disp: 60 g, Rfl: 2    hydroxychloroquine (PLAQUENIL) 200 mg tablet, Take one tablet by  Mouth three times a week, Disp: 36 tablet, Rfl: 3    LIDOcaine (LIDODERM) 5 %, Place 1 patch onto the skin., Disp: , Rfl:     meclizine (ANTIVERT) 12.5 mg tablet, Take 1 tablet (12.5 mg total) by mouth 2 (two) times daily as needed for Dizziness., Disp: 14 tablet, Rfl: 0    meloxicam (MOBIC) 15 MG tablet, Take 1 tablet (15 mg total) by mouth once daily. As needed for pain, Disp: 90 tablet, Rfl: 2    nystatin (MYCOSTATIN) powder, Apply topically Twice daily., Disp: 60 g, Rfl: 3    pravastatin (PRAVACHOL) 20 MG tablet, Take 1 tablet (20 mg total) by mouth once daily., Disp: 90 tablet, Rfl: 3    triamcinolone acetonide 0.1% (KENALOG) 0.1 % cream, Apply topically 2 (two) times daily as needed. Thin film to area on face, Disp: 45 g, Rfl: 1    valsartan (DIOVAN) 80 MG tablet, Take 1 tablet (80 mg total) by mouth once daily., Disp: 90 tablet, Rfl: 0    vitamin D (VITAMIN D3) 1000 units Tab, Take 1,000 Units by mouth once daily., Disp: , Rfl:     vitamin E 400 UNIT capsule, Take 1 capsule (400 Units total) by mouth once daily., Disp: , Rfl:   Past Medical History:   Diagnosis Date    Avascular necrosis of hip     Left    Avascular necrosis of humeral head     Right    Cataract     Human metapneumovirus (hMPV) pneumonia 05/06/2025    Lumbar disc disease     Lupus      Osteoarthritis     Osteopenia     Osteoporosis     Raynaud phenomenon     Systemic lupus erythematosus     Tubular adenoma of colon 09/20/2016    Unspecified vitamin D deficiency      Family History   Problem Relation Name Age of Onset    Cervical cancer Mother      Heart attack Father  62    Breast cancer Sister      Diabetes Brother      Cataracts Maternal Aunt       Social History     Socioeconomic History    Marital status:    Tobacco Use    Smoking status: Never    Smokeless tobacco: Never   Substance and Sexual Activity    Alcohol use: No    Drug use: No    Sexual activity: Not Currently     Review of patient's allergies indicates:   Allergen Reactions    No known drug allergies        Objective:   /69 (BP Location: Left arm, Patient Position: Sitting)   Pulse 72   Wt 62 kg (136 lb 11 oz)   BMI 23.46 kg/m²   Immunization History   Administered Date(s) Administered    COVID-19, MRNA, LN-S, PF (MODERNA FULL 0.5 ML DOSE) 01/11/2021, 02/08/2021, 11/17/2021    Influenza (FLUAD) - Quadrivalent - Adjuvanted - PF *Preferred* (65+) 10/10/2023    Influenza - Trivalent - Fluzone High Dose - PF (65 years and older) 10/13/2015, 10/25/2016, 02/04/2020    Influenza A (H1N1) 2009 Monovalent - IM 12/16/2009    Influenza Split 10/04/2013    Pneumococcal Conjugate - 13 Valent 10/15/2014    Pneumococcal Polysaccharide - 23 Valent 01/09/2002, 02/04/2020     Physical Exam  Vitals and nursing note reviewed.   Constitutional:       General: She is not in acute distress.     Appearance: She is well-developed.   HENT:      Head: Normocephalic and atraumatic.   Eyes:      Pupils: Pupils are equal, round, and reactive to light.   Cardiovascular:      Rate and Rhythm: Normal rate and regular rhythm.      Heart sounds: Normal heart sounds.   Pulmonary:      Effort: Pulmonary effort is normal.      Breath sounds: Normal breath sounds.   Abdominal:      General: There is no distension.   Musculoskeletal:         General:  No swelling or tenderness. Normal range of motion.      Right shoulder: No swelling or tenderness. Normal range of motion.      Left shoulder: No swelling or tenderness. Normal range of motion.      Right elbow: No swelling. Normal range of motion. No tenderness.      Left elbow: No swelling. Normal range of motion. No tenderness.      Right wrist: No swelling or tenderness. Normal range of motion.      Left wrist: No swelling or tenderness. Normal range of motion.      Right hand: No swelling or tenderness. Normal range of motion.      Left hand: No swelling or tenderness. Normal range of motion.      Cervical back: Normal range of motion.      Right knee: No swelling. Normal range of motion. No tenderness.      Left knee: No swelling. Normal range of motion. No tenderness.      Right foot: Normal range of motion. No swelling or tenderness.      Left foot: Normal range of motion. No swelling or tenderness.   Lymphadenopathy:      Cervical: No cervical adenopathy.   Skin:     General: Skin is warm and dry.      Findings: No rash.   Neurological:      Mental Status: She is alert and oriented to person, place, and time.   Psychiatric:         Mood and Affect: Mood normal.         Behavior: Behavior normal.           No synovitis, no dactylitis, no enthesitis  No effusions of large or small joints  100% fist formation  Well preserved ROM hands/elbows  Decreased ROM shoulders d/t pain      Assessment:     1. Systemic lupus erythematosus, unspecified SLE type, unspecified organ involvement status    2. Osteopenia with high risk of fracture    3. Other secondary osteoarthritis of both shoulders    4. Avascular necrosis of humeral head, left    5. Other systemic lupus erythematosus with other organ involvement    6. Raynaud's phenomenon without gangrene            Plan:     Alice was seen today for disease management and consult.    Diagnoses and all orders for this visit:    Systemic lupus erythematosus, unspecified  SLE type, unspecified organ involvement status  -     Ambulatory referral/consult to Rheumatology  -     X-Ray Shoulder Complete Bilateral; Future  -     C-Reactive Protein; Future  -     C4 Complement; Future  -     Anti-DNA Ab, Double-Stranded; Future  -     C3 Complement; Future  -     Sedimentation rate; Future  -     CBC Auto Differential; Future  -     Comprehensive Metabolic Panel; Future  -     Protein/Creatinine Ratio, Urine; Future  -     hydroxychloroquine (PLAQUENIL) 200 mg tablet; Take one tablet by  Mouth three times a week    Osteopenia with high risk of fracture  -     Ambulatory referral/consult to Rheumatology    Other secondary osteoarthritis of both shoulders  -     X-Ray Shoulder Complete Bilateral; Future  -     cyclobenzaprine (FLEXERIL) 10 MG tablet; Take 1 tablet (10 mg total) by mouth every evening.    Raynaud's phenomenon without gangrene    Chronic bursitis of left shoulder  -     Large Joint Aspiration/Injection: L subacromial bursa  -     methylPREDNISolone acetate injection 40 mg    Other orders  The following orders have not been finalized:  -     Cancel: ergocalciferol (ERGOCALCIFEROL) 50,000 unit Cap    1 SLE in remission   HCQ -needs updated HVF exam done    2. Osteopenia with high risk fracture. Prolia start: 10/2022 per records, continue for now.     3. Imaging shoulders given hx of AVN (or severe degen changes)    4. Raynaud's stable.     No follow-ups on file.    60min consultation with greater than 50% of that time included Preparing to see the patient (review records, tests), Obtaining and/or reviewing separately obtained historical data, Performing a medically appropriate examination and/or evaluation , Ordering medications, tests, and/or procedures, Referring and communicating with other healthcare professionals , Documenting clinical information in the electronic or other health record and Independently interpreting results  (as warranted) & communicating results to the  patient/family/caregiver. All questions answered.  Thank you for allowing me to participate in the care of this very pleasant patient.                Review of Systems   Constitutional:  Negative for fever, malaise/fatigue and weight loss.   HENT:  Negative for sore throat.    Eyes:  Negative for double vision, photophobia and redness.   Respiratory:  Negative for cough, shortness of breath and wheezing.    Cardiovascular:  Negative for chest pain, palpitations and orthopnea.   Gastrointestinal:  Negative for abdominal pain, constipation and diarrhea.   Genitourinary:  Negative for dysuria, hematuria and urgency.   Musculoskeletal:  Negative for back pain, joint pain and myalgias.   Skin:  Negative for rash.   Neurological:  Negative for dizziness, tingling, focal weakness and headaches.   Endo/Heme/Allergies:  Does not bruise/bleed easily.   Psychiatric/Behavioral:  Negative for depression, hallucinations and suicidal ideas.

## 2025-07-11 ENCOUNTER — TELEPHONE (OUTPATIENT)
Dept: FAMILY MEDICINE | Facility: CLINIC | Age: 75
End: 2025-07-11
Payer: OTHER GOVERNMENT

## 2025-07-11 DIAGNOSIS — R31.9 HEMATURIA, UNSPECIFIED TYPE: ICD-10-CM

## 2025-07-11 DIAGNOSIS — R30.0 DYSURIA: Primary | ICD-10-CM

## 2025-07-30 ENCOUNTER — OFFICE VISIT (OUTPATIENT)
Dept: FAMILY MEDICINE | Facility: CLINIC | Age: 75
End: 2025-07-30
Payer: MEDICARE

## 2025-07-30 VITALS
BODY MASS INDEX: 23.17 KG/M2 | SYSTOLIC BLOOD PRESSURE: 128 MMHG | HEART RATE: 68 BPM | TEMPERATURE: 98 F | WEIGHT: 135.69 LBS | HEIGHT: 64 IN | DIASTOLIC BLOOD PRESSURE: 75 MMHG

## 2025-07-30 DIAGNOSIS — E78.5 HYPERLIPIDEMIA, UNSPECIFIED HYPERLIPIDEMIA TYPE: ICD-10-CM

## 2025-07-30 DIAGNOSIS — I10 PRIMARY HYPERTENSION: Primary | ICD-10-CM

## 2025-07-30 PROCEDURE — 1159F MED LIST DOCD IN RCRD: CPT | Mod: CPTII,S$GLB,, | Performed by: FAMILY MEDICINE

## 2025-07-30 PROCEDURE — 1125F AMNT PAIN NOTED PAIN PRSNT: CPT | Mod: CPTII,S$GLB,, | Performed by: FAMILY MEDICINE

## 2025-07-30 PROCEDURE — 1101F PT FALLS ASSESS-DOCD LE1/YR: CPT | Mod: CPTII,S$GLB,, | Performed by: FAMILY MEDICINE

## 2025-07-30 PROCEDURE — G2211 COMPLEX E/M VISIT ADD ON: HCPCS | Mod: S$GLB,,, | Performed by: FAMILY MEDICINE

## 2025-07-30 PROCEDURE — 3078F DIAST BP <80 MM HG: CPT | Mod: CPTII,S$GLB,, | Performed by: FAMILY MEDICINE

## 2025-07-30 PROCEDURE — 99999 PR PBB SHADOW E&M-EST. PATIENT-LVL IV: CPT | Mod: PBBFAC,,, | Performed by: FAMILY MEDICINE

## 2025-07-30 PROCEDURE — 99213 OFFICE O/P EST LOW 20 MIN: CPT | Mod: S$GLB,,, | Performed by: FAMILY MEDICINE

## 2025-07-30 PROCEDURE — 3074F SYST BP LT 130 MM HG: CPT | Mod: CPTII,S$GLB,, | Performed by: FAMILY MEDICINE

## 2025-07-30 PROCEDURE — 3288F FALL RISK ASSESSMENT DOCD: CPT | Mod: CPTII,S$GLB,, | Performed by: FAMILY MEDICINE

## 2025-07-30 RX ORDER — PRAVASTATIN SODIUM 20 MG/1
20 TABLET ORAL DAILY
Qty: 90 TABLET | Refills: 3 | Status: SHIPPED | OUTPATIENT
Start: 2025-07-30 | End: 2025-10-28

## 2025-07-30 RX ORDER — VALSARTAN 80 MG/1
80 TABLET ORAL DAILY
Qty: 90 TABLET | Refills: 3 | Status: SHIPPED | OUTPATIENT
Start: 2025-07-30 | End: 2026-07-30

## 2025-07-31 NOTE — PROGRESS NOTES
Follow-up hypertension.  Started valsartan since last visit.  Tolerating well.  Blood pressure better controlled.  Hyperlipidemia needs refill pravastatin.    Alice was seen today for follow-up.    Diagnoses and all orders for this visit:    Primary hypertension    Hyperlipidemia, unspecified hyperlipidemia type  -     pravastatin (PRAVACHOL) 20 MG tablet; Take 1 tablet (20 mg total) by mouth once daily.    Other orders  -     valsartan (DIOVAN) 80 MG tablet; Take 1 tablet (80 mg total) by mouth once daily.    Continue current medications.        This note was generated with the assistance of ambient listening technology. Verbal consent was obtained by the patient and accompanying visitor(s) for the recording of patient appointment to facilitate this note. I attest to having reviewed and edited the generated note for accuracy, though some syntax or spelling errors may persist. Please contact the author of this note for any clarification.        Past Medical History:  Past Medical History:   Diagnosis Date    Avascular necrosis of hip     Left    Avascular necrosis of humeral head     Right    Cataract     Human metapneumovirus (hMPV) pneumonia 05/06/2025    Lumbar disc disease     Lupus     Osteoarthritis     Osteopenia     Osteoporosis     Raynaud phenomenon     Systemic lupus erythematosus     Tubular adenoma of colon 09/20/2016    Unspecified vitamin D deficiency      Past Surgical History:   Procedure Laterality Date    CHOLECYSTECTOMY      COLONOSCOPY N/A 9/20/2016    Procedure: COLONOSCOPY;  Surgeon: Aroldo Raphael MD;  Location: [facility];  Service: Endoscopy;  Laterality: N/A;    TOTAL HIP ARTHROPLASTY      Left     Review of patient's allergies indicates:   Allergen Reactions    No known drug allergies      Medications Ordered Prior to Encounter[1]  Social History[2]  Family History   Problem Relation Name Age of Onset    Cervical cancer Mother      Heart attack Father  62    Breast cancer Sister    "   Diabetes Brother      Cataracts Maternal Aunt             ROS:  GENERAL: No fever, chills,  or significant weight changes.   CARDIOVASCULAR: Denies chest pain, PND, orthopnea or reduced exercise tolerance.  ABDOMEN: Appetite fine. Denies diarrhea, abdominal pain, hematemesis or blood in stool.  URINARY: No flank pain, dysuria or hematuria.    Vitals:    07/30/25 1104   BP: 128/75   Pulse: 68   Temp: 97.6 °F (36.4 °C)   TempSrc: Temporal   Weight: 61.6 kg (135 lb 11.2 oz)   Height: 5' 4" (1.626 m)       Wt Readings from Last 3 Encounters:   07/30/25 61.6 kg (135 lb 11.2 oz)   07/03/25 62 kg (136 lb 11 oz)   06/24/25 62.2 kg (137 lb 3.2 oz)       APPEARANCE: Well nourished, well developed, in no acute distress.    HEAD: Normocephalic.  Atraumatic.  EYES:   Right eye: Pupil reactive.  Conjunctiva clear.    Left eye: Pupil reactive.  Conjunctiva clear.    NECK: Supple. No bruits.  No JVD.  No cervical lymphadenopathy.  No thyromegaly.    CHEST: Breath sounds clear bilaterally.  Normal respiratory effort  CARDIOVASCULAR: Normal rate.  Regular rhythm.  No murmurs.  No rub.  No gallops.   No edema.  MENTAL STATUS: Alert.  Oriented x 3.       [1]   Current Outpatient Medications on File Prior to Visit   Medication Sig Dispense Refill    albuterol (PROVENTIL/VENTOLIN HFA) 90 mcg/actuation inhaler Inhale 2 puffs into the lungs every 6 (six) hours as needed for Shortness of Breath or Wheezing. 18 g 0    cyclobenzaprine (FLEXERIL) 10 MG tablet Take 1 tablet (10 mg total) by mouth every evening. 90 tablet 3    hydroxychloroquine (PLAQUENIL) 200 mg tablet Take one tablet by  Mouth three times a week 36 tablet 3    LIDOcaine (LIDODERM) 5 % Place 1 patch onto the skin.      meclizine (ANTIVERT) 12.5 mg tablet Take 1 tablet (12.5 mg total) by mouth 2 (two) times daily as needed for Dizziness. 14 tablet 0    meloxicam (MOBIC) 15 MG tablet Take 1 tablet (15 mg total) by mouth once daily. As needed for pain 90 tablet 2    nystatin " (MYCOSTATIN) powder Apply topically Twice daily. 60 g 3    vitamin D (VITAMIN D3) 1000 units Tab Take 1,000 Units by mouth once daily.      vitamin E 400 UNIT capsule Take 1 capsule (400 Units total) by mouth once daily.      [DISCONTINUED] pravastatin (PRAVACHOL) 20 MG tablet Take 1 tablet (20 mg total) by mouth once daily. 90 tablet 0    [DISCONTINUED] valsartan (DIOVAN) 80 MG tablet Take 1 tablet (80 mg total) by mouth once daily. 90 tablet 0    desonide (DESOWEN) 0.05 % cream Apply topically 2 (two) times daily. for 10 days 60 g 2    triamcinolone acetonide 0.1% (KENALOG) 0.1 % cream Apply topically 2 (two) times daily as needed. Thin film to area on face 45 g 1     No current facility-administered medications on file prior to visit.   [2]   Social History  Socioeconomic History    Marital status:    Tobacco Use    Smoking status: Never    Smokeless tobacco: Never   Substance and Sexual Activity    Alcohol use: No    Drug use: No    Sexual activity: Not Currently

## 2025-08-04 RX ORDER — METHYLPREDNISOLONE ACETATE 40 MG/ML
40 INJECTION, SUSPENSION INTRA-ARTICULAR; INTRALESIONAL; INTRAMUSCULAR; SOFT TISSUE
Status: SHIPPED | OUTPATIENT
Start: 2025-07-03

## 2025-08-04 NOTE — PROCEDURES
Large Joint Aspiration/Injection: L subacromial bursa    Date/Time: 7/3/2025 2:30 PM    Performed by: Amairani Miller DO  Authorized by: Amairani Miller DO    Site marked: the procedure site was marked    Timeout: prior to procedure the correct patient, procedure, and site was verified    Prep: patient was prepped and draped in usual sterile fashion      Local anesthesia used?: Yes    Anesthesia:  Local infiltration  Local anesthetic:  Lidocaine 1% with epinephrine  Anesthetic total (ml):  2      Details:  Needle Size:  25 G  Approach:  Posterior  Location:  Shoulder  Site:  L subacromial bursa  Medications:  40 mg methylPREDNISolone acetate 40 mg/mL  Patient tolerance:  Patient tolerated the procedure well with no immediate complications     Patient informed of the risks, benefits, side effects of corticosteroid injections including but not limited to skin hypopigmentation, atrophy, ineffectiveness and infection.